# Patient Record
Sex: FEMALE | Race: WHITE | NOT HISPANIC OR LATINO | Employment: PART TIME | URBAN - METROPOLITAN AREA
[De-identification: names, ages, dates, MRNs, and addresses within clinical notes are randomized per-mention and may not be internally consistent; named-entity substitution may affect disease eponyms.]

---

## 2023-08-07 DIAGNOSIS — K21.00 GASTROESOPHAGEAL REFLUX DISEASE WITH ESOPHAGITIS WITHOUT HEMORRHAGE: Primary | ICD-10-CM

## 2023-08-07 RX ORDER — PANTOPRAZOLE SODIUM 40 MG/1
40 TABLET, DELAYED RELEASE ORAL DAILY
Qty: 90 TABLET | Refills: 0 | Status: SHIPPED | OUTPATIENT
Start: 2023-08-07 | End: 2023-10-30

## 2023-10-30 DIAGNOSIS — K21.00 GASTROESOPHAGEAL REFLUX DISEASE WITH ESOPHAGITIS WITHOUT HEMORRHAGE: ICD-10-CM

## 2023-10-30 RX ORDER — PANTOPRAZOLE SODIUM 40 MG/1
40 TABLET, DELAYED RELEASE ORAL DAILY
Qty: 90 TABLET | Refills: 0 | Status: SHIPPED | OUTPATIENT
Start: 2023-10-30 | End: 2024-02-05

## 2023-12-05 ENCOUNTER — TELEPHONE (OUTPATIENT)
Dept: OBSTETRICS AND GYNECOLOGY | Facility: CLINIC | Age: 53
End: 2023-12-05
Payer: COMMERCIAL

## 2023-12-06 NOTE — TELEPHONE ENCOUNTER
Pt verified by name and .  Pt calling for mammogram req.  Pt is aware she has not been seen in office since .  Nurse explanted to pt Dr. Warren would  like to see her for annual then give her mammogram req after being evaluated.  Pt states nurse can cancel her upcoming appt.  Pt has no questions at this time.

## 2023-12-11 DIAGNOSIS — J30.89 NON-SEASONAL ALLERGIC RHINITIS, UNSPECIFIED TRIGGER: Primary | ICD-10-CM

## 2023-12-11 RX ORDER — MONTELUKAST SODIUM 10 MG/1
10 TABLET ORAL DAILY
Qty: 90 TABLET | Refills: 0 | Status: SHIPPED | OUTPATIENT
Start: 2023-12-11 | End: 2024-03-14

## 2023-12-13 ENCOUNTER — TELEPHONE (OUTPATIENT)
Dept: PRIMARY CARE | Facility: CLINIC | Age: 53
End: 2023-12-13
Payer: COMMERCIAL

## 2023-12-13 DIAGNOSIS — Z12.31 BREAST CANCER SCREENING BY MAMMOGRAM: Primary | ICD-10-CM

## 2023-12-21 ENCOUNTER — ANCILLARY PROCEDURE (OUTPATIENT)
Dept: RADIOLOGY | Facility: CLINIC | Age: 53
End: 2023-12-21
Payer: COMMERCIAL

## 2023-12-21 DIAGNOSIS — Z12.31 BREAST CANCER SCREENING BY MAMMOGRAM: ICD-10-CM

## 2023-12-21 DIAGNOSIS — R92.8 ABNORMAL MAMMOGRAM OF BOTH BREASTS: Primary | ICD-10-CM

## 2023-12-21 PROCEDURE — 77067 SCR MAMMO BI INCL CAD: CPT | Performed by: RADIOLOGY

## 2023-12-21 PROCEDURE — 77063 BREAST TOMOSYNTHESIS BI: CPT | Performed by: RADIOLOGY

## 2023-12-21 PROCEDURE — 77067 SCR MAMMO BI INCL CAD: CPT

## 2023-12-21 NOTE — RESULT ENCOUNTER NOTE
Patient will need further evaluation of her mammograms.  She will need an ultrasound of both breasts and also spot compression views of both breasts.

## 2023-12-26 ENCOUNTER — ANCILLARY PROCEDURE (OUTPATIENT)
Dept: RADIOLOGY | Facility: CLINIC | Age: 53
End: 2023-12-26
Payer: COMMERCIAL

## 2023-12-26 DIAGNOSIS — R92.8 ABNORMAL MAMMOGRAM OF BOTH BREASTS: ICD-10-CM

## 2023-12-26 PROBLEM — N63.20 MASS OF LEFT BREAST: Status: ACTIVE | Noted: 2023-12-26

## 2023-12-26 PROBLEM — R59.0 AXILLARY LYMPHADENOPATHY: Status: ACTIVE | Noted: 2023-12-26

## 2023-12-26 PROCEDURE — 77062 BREAST TOMOSYNTHESIS BI: CPT

## 2023-12-26 PROCEDURE — 77066 DX MAMMO INCL CAD BI: CPT | Performed by: STUDENT IN AN ORGANIZED HEALTH CARE EDUCATION/TRAINING PROGRAM

## 2023-12-26 PROCEDURE — 77062 BREAST TOMOSYNTHESIS BI: CPT | Performed by: STUDENT IN AN ORGANIZED HEALTH CARE EDUCATION/TRAINING PROGRAM

## 2023-12-26 PROCEDURE — 76642 ULTRASOUND BREAST LIMITED: CPT | Performed by: STUDENT IN AN ORGANIZED HEALTH CARE EDUCATION/TRAINING PROGRAM

## 2023-12-26 PROCEDURE — 76642 ULTRASOUND BREAST LIMITED: CPT | Mod: 50

## 2023-12-26 PROCEDURE — 76982 USE 1ST TARGET LESION: CPT

## 2023-12-26 NOTE — PROGRESS NOTES
"[facility]  Bisi Reynaga female   1970 53 y.o.  [id number]      Chief Complaint  New patient, biopsy consultation.    History Of Present Illness  Bisi Reynaga \"Marlon" is a pleasant 53 y.o. female seen in the breast center for biopsy consultation. She denies breast surgery or biopsy. She has family history of breast cancer, see below.    BREAST IMAGIN2023 Bilateral diagnostic mammogram with bilateral ultrasound, indicates BI-RADS Category 4. Suspicious left breast mass with axillary lymphadenopathy. Ultrasound-guided biopsy of the left breast mass at 1:00 and the axillary lymph node labelled #1 is recommended.     REPRODUCTIVE HISTORY:  menarche age 12, , first birth age 28, did not breastfeed, OCP's over 20 years, natural menopause age 52, no HRT, scattered fibroglandular tissue    FAMILY CANCER HISTORY:   Maternal Aunt: Breast cancer, age 40  Maternal Great Aunt: Breast cancer, age 45  Paternal Grandmother: Colon cancer, age 65  Father: Prostate cancer, age 58 and skin cancer, age 60    Review of Systems  Constitutional:  Negative for appetite change, fatigue, fever and unexpected weight change.   HENT:  Negative for ear pain, hearing loss, nosebleeds, sore throat and trouble swallowing.    Eyes:  Negative for discharge, itching and visual disturbance.   Breast: As stated in HPI.  Respiratory:  Negative for cough, chest tightness and shortness of breath.    Cardiovascular:  Negative for chest pain, palpitations and leg swelling.   Gastrointestinal:  Negative for abdominal pain, constipation, diarrhea and nausea.   Endocrine: Negative for cold intolerance and heat intolerance.   Genitourinary:  Negative for dysuria, frequency, hematuria, pelvic pain and vaginal bleeding.   Musculoskeletal:  Negative for arthralgias, back pain, gait problem, joint swelling and myalgias.   Skin:  Negative for color change and rash.   Allergic/Immunologic: Negative for environmental allergies and " food allergies.   Neurological:  Negative for dizziness, tremors, speech difficulty, weakness, numbness and headaches.   Hematological:  Does not bruise/bleed easily.   Psychiatric/Behavioral:  Negative for agitation, dysphoric mood and sleep disturbance. The patient is not nervous/anxious.       Past Medical History  She has a past medical history of Personal history of urinary calculi (09/25/2014).    Surgical History  She has no past surgical history on file.    Family History  Cancer-related family history includes Breast cancer (age of onset: 40) in her mother's sister and another family member.     Social History  Social History     Tobacco Use    Smoking status: Not on file    Smokeless tobacco: Not on file   Substance Use Topics    Alcohol use: Not on file      Allergies  No Known Allergies    Medications  Current Outpatient Medications   Medication Instructions    montelukast (SINGULAIR) 10 mg, oral, Daily    pantoprazole (PROTONIX) 40 mg, oral, Daily       Last Recorded Vitals  Blood pressure (!) 140/95, pulse 94, temperature 36.7 °C (98.1 °F), weight 84.5 kg (186 lb 2.9 oz).      Physical Exam  Patient is alert and oriented x3 and in a relaxed and appropriate mood. Her gait is steady and hand grasps are equal. Sclera is clear. The breasts are nearly symmetrical. The tissue is soft without palpable abnormalities, discrete nodules or masses. The skin and nipples appear normal. There is no cervical, supraclavicular or axillary lymphadenopathy. Heart rate and rhythm normal, S1 and S2 appreciated. The lungs are clear to auscultation bilaterally. Abdomen is soft and non-tender.      Relevant Results and Imaging    Study Result    Narrative & Impression   Interpreted By:  Neli Gaming,  Kathy Retana   STUDY:  BI MAMMO BILATERAL DIAGNOSTIC TOMOSYNTHESIS; BI US BREAST LIMITED  BILATERAL;  12/26/2023 8:44 am; 12/26/2023 9:17 am      ACCESSION NUMBER(S):  [id number]; [id number]      ORDERING  CLINICIAN:  SULEMAN CHOU      INDICATION:  The patient was recalled from recent screening mammogram 12/21/2023  for a right breast asymmetry and 2 left breast masses.      COMPARISON:  12/21/2023, 06/04/2020, 03/02/2017.      FINDINGS:  MAMMOGRAPHY: 2D and tomosynthesis images were reviewed at 1 mm slice  thickness.      Density:  There are areas of scattered fibroglandular tissue.      The asymmetry in the medial right breast persists on additional  imaging. This is stable dating back to mammogram 08/04/2005. No  suspicious masses or calcifications are identified in the right  breast.      There is an irregular high density mass with associated architectural  distortion in the superolateral left breast at middle depth. An oval  circumscribed equal density mass in the central lateral left breast  at middle depth persists on additional imaging.      ULTRASOUND:  A targeted ultrasound of the entire medial right breast  was performed by a registered sonographer.      No suspicious sonographic abnormalities are identified to correspond  with the right breast asymmetry.      ULTRASOUND:  A targeted ultrasound of the left breast and axilla was  performed by a registered sonographer using elastography.      An irregular hypoechoic mass with microlobulated margins is seen at  the 1 o'clock position 8 cm from the nipple. The mass measures 1.9 x  1.2 x 2.0 cm. It demonstrates internal vascularity and hard  elastography characteristics. This corresponds with irregular  mammographic left breast mass.      A round circumscribed anechoic cyst is identified at the 4 o'clock  position 3 cm from the nipple. The cyst measures 0.4 x 0.4 x 0.3 cm.  It is avascular and soft on elastography. This benign cyst  corresponds with the mammographic mass in the central lateral breast.      A targeted ultrasound of the left axilla demonstrates 1  morphologically abnormal left axillary lymph node. The axillary lymph  node labeled #1  demonstrates a thickened cortex measuring 0.7 cm. An  additional 4 morphologically normal lymph nodes are identified.      IMPRESSION:  1. Suspicious left breast mass with axillary lymphadenopathy. Further  evaluation with surgical consultation and ultrasound-guided biopsy of  the left breast mass at 1:00 and the axillary lymph node labelled #1  is recommended. Dr. Mazin Vazquez explained the findings and  recommendations to the patient at the time of exam. A message was  sent to the referring practitioner at the time of this dictation  regarding these critical findings using the Epic notification system.  A pre-procedure form was filled out.  2. A benign simple left breast cyst at the 4:00 position correlates  with the smaller mammographic left breast mass. No additional imaging  follow-up is recommended for this finding.  3. The right breast asymmetry without a sonographic correlate is  mammographically stable dating back to 2005 and considered benign.  This likely represents the sternalis muscle.      Method of Detection: Category Sdbt - 3D Screening      BI-RADS CATEGORY:      BI-RADS Category:  4 Suspicious.  Recommendation:  Biopsy.  Recommended Date:  Immediate.  Laterality:  Left.      For any future breast imaging appointments, please call [phone] ([phone]).      I personally reviewed the images/study and I agree with the findings  as stated by fellow physician, Dr. Mazin Vazquez.          MACRO:  Critical Finding:  Breast Imaging Abnormality. Notification was  initiated on 12/26/2023 at 9:53 am by  Mazin Vazquez.  (**-YCF-**)  Instructions:  Surgical Consultation and Imaging Guided Biopsy.      Signed by: Neli Gaming 12/26/2023 10:04 AM     Time was spent viewing digital images. I explained the results in depth, along with suggested explanation for follow up recommendations based on the testing results. BI-RADS Category 4    Visit Diagnosis  1. Abnormal finding on breast imaging        2. Mass of left  breast, unspecified quadrant        3. Axillary lymphadenopathy          Assessment/Plan  Abnormal mammogram, left breast mass, left axillary lymphadenopathy, no breast surgery or biopsy, family history of breast cancer, scattered fibroglandular tissue    Plan:  Left breast and axillary ultrasound guided core biopsy.    Patient Discussion/Summary  I recommend a left breast and axillary ultrasound guided core biopsy. A breast radiology physician will perform the procedure. Possible diagnoses include benign, atypia or cancer. Bruising and mild discomfort after the biopsy is normal and will improve. I typically have results in 5-7 business days. I will call you with the results, please have your phone handy to take my call. I will provide recommendations for future follow up based on your biopsy results.     IMPORTANT INFORMATION REGARDING YOUR RESULTS    If you receive medical information from My [facility] Personal Health Record (online chart) your results will be released into your chart. This means you may view or see results of your biopsy or procedure before I contact you directly. If this occurs, please call the office and we will discuss your results over the phone.    You can see your health information, review clinical summaries from office visits & test results online when you follow your health with MY  Chart, a personal health record. To sign up go to [email or web address]. If you need assistance with signing up or trouble getting into your account call [facility] Patient Line 24/7 at [phone].    My office phone number is [phone]  if you need to get in touch with me or have additional questions or concerns. Thank you for choosing [facility] and trusting me as your healthcare provider. I look forward to seeing you again at your next office visit. I am honored to be a provider on your health care team and I remain dedicated to helping you achieve your health goals.       Yuliana Schmitt,  APRN-CNP

## 2023-12-27 ENCOUNTER — APPOINTMENT (OUTPATIENT)
Dept: RADIOLOGY | Facility: HOSPITAL | Age: 53
End: 2023-12-27
Payer: COMMERCIAL

## 2023-12-27 ENCOUNTER — PROCEDURE VISIT (OUTPATIENT)
Dept: SURGICAL ONCOLOGY | Facility: CLINIC | Age: 53
End: 2023-12-27
Payer: COMMERCIAL

## 2023-12-27 VITALS
TEMPERATURE: 98.1 F | SYSTOLIC BLOOD PRESSURE: 140 MMHG | HEART RATE: 94 BPM | DIASTOLIC BLOOD PRESSURE: 95 MMHG | BODY MASS INDEX: 27.49 KG/M2 | WEIGHT: 186.18 LBS

## 2023-12-27 DIAGNOSIS — N63.20 MASS OF LEFT BREAST, UNSPECIFIED QUADRANT: ICD-10-CM

## 2023-12-27 DIAGNOSIS — R59.0 AXILLARY LYMPHADENOPATHY: ICD-10-CM

## 2023-12-27 DIAGNOSIS — R92.8 ABNORMAL FINDING ON BREAST IMAGING: Primary | ICD-10-CM

## 2023-12-27 PROCEDURE — 99214 OFFICE O/P EST MOD 30 MIN: CPT | Performed by: NURSE PRACTITIONER

## 2023-12-27 PROCEDURE — 99204 OFFICE O/P NEW MOD 45 MIN: CPT | Performed by: NURSE PRACTITIONER

## 2023-12-27 ASSESSMENT — PAIN SCALES - GENERAL: PAINLEVEL: 0-NO PAIN

## 2023-12-28 ENCOUNTER — TELEPHONE (OUTPATIENT)
Dept: RADIOLOGY | Facility: HOSPITAL | Age: 53
End: 2023-12-28
Payer: COMMERCIAL

## 2023-12-28 ENCOUNTER — APPOINTMENT (OUTPATIENT)
Dept: RADIOLOGY | Facility: HOSPITAL | Age: 53
End: 2023-12-28
Payer: COMMERCIAL

## 2023-12-28 NOTE — TELEPHONE ENCOUNTER
Spoke with pt about date, time location and parking, aware it is okay to eat drink and drive as long as not taking sedating medications or having another appointment with different instructions, reviewed pre, post and discharge instructions, instructed to wear most supportive bra and tylenol for discomfort. Verbalizing understanding , no questions  NKA, okay with lidocaine, denies use of blood thinning medications.

## 2023-12-29 ENCOUNTER — HOSPITAL ENCOUNTER (OUTPATIENT)
Dept: RADIOLOGY | Facility: HOSPITAL | Age: 53
Discharge: HOME | End: 2023-12-29
Payer: COMMERCIAL

## 2023-12-29 ENCOUNTER — APPOINTMENT (OUTPATIENT)
Dept: RADIOLOGY | Facility: CLINIC | Age: 53
End: 2023-12-29
Payer: COMMERCIAL

## 2023-12-29 DIAGNOSIS — R92.8 ABNORMAL FINDING ON BREAST IMAGING: ICD-10-CM

## 2023-12-29 DIAGNOSIS — R92.8 OTHER ABNORMAL AND INCONCLUSIVE FINDINGS ON DIAGNOSTIC IMAGING OF BREAST: ICD-10-CM

## 2023-12-29 DIAGNOSIS — N63.20 MASS OF LEFT BREAST, UNSPECIFIED QUADRANT: ICD-10-CM

## 2023-12-29 DIAGNOSIS — R59.0 AXILLARY LYMPHADENOPATHY: ICD-10-CM

## 2023-12-29 PROCEDURE — 38505 NEEDLE BIOPSY LYMPH NODES: CPT | Mod: LEFT SIDE | Performed by: RADIOLOGY

## 2023-12-29 PROCEDURE — 88368 INSITU HYBRIDIZATION MANUAL: CPT | Performed by: PATHOLOGY

## 2023-12-29 PROCEDURE — 2720000007 HC OR 272 NO HCPCS

## 2023-12-29 PROCEDURE — 88360 TUMOR IMMUNOHISTOCHEM/MANUAL: CPT | Performed by: PATHOLOGY

## 2023-12-29 PROCEDURE — 19083 BX BREAST 1ST LESION US IMAG: CPT | Mod: LEFT SIDE | Performed by: RADIOLOGY

## 2023-12-29 PROCEDURE — 88305 TISSUE EXAM BY PATHOLOGIST: CPT | Performed by: PATHOLOGY

## 2023-12-29 PROCEDURE — 10035 PLMT SFT TISS LOCLZJ DEV 1ST: CPT | Mod: LEFT SIDE | Performed by: RADIOLOGY

## 2023-12-29 PROCEDURE — 76942 ECHO GUIDE FOR BIOPSY: CPT

## 2023-12-29 PROCEDURE — 88305 TISSUE EXAM BY PATHOLOGIST: CPT | Mod: TC,SUR | Performed by: NURSE PRACTITIONER

## 2023-12-29 PROCEDURE — 19083 BX BREAST 1ST LESION US IMAG: CPT | Mod: LT

## 2023-12-29 PROCEDURE — 38505 NEEDLE BIOPSY LYMPH NODES: CPT

## 2023-12-29 PROCEDURE — 2500000005 HC RX 250 GENERAL PHARMACY W/O HCPCS: Performed by: RADIOLOGY

## 2023-12-29 PROCEDURE — 77065 DX MAMMO INCL CAD UNI: CPT | Mod: LEFT SIDE | Performed by: RADIOLOGY

## 2023-12-29 PROCEDURE — 77065 DX MAMMO INCL CAD UNI: CPT

## 2023-12-29 RX ADMIN — Medication 6 ML: at 09:15

## 2023-12-29 RX ADMIN — Medication 6 ML: at 09:00

## 2023-12-29 NOTE — Clinical Note
0840 reviewed procedure confirmed no allergies, no pain not pregnant, no blood thinning medication used has not used lidocaine in the past feels safe at home no history of falls, no breast pain, area cleansed x2, lidocaine given by Dr. So, first procedure left breast completed 0907, pain 1/10  pressure held for 10 min, axilla completed 0921 pain 1/10,debrief 0924, doctor out of [id number] pressure held for 10 min, both sites without bl eeding, steri strips and DSD applied, mammogram completed and okayed, reviewed discharge instructions given pamphlet and card with provider and nurse line numbers, observed site demonstrated ice placement, verbalizing understanding no questions pain remains 1/10 area D&I, verbalizing understanding, no questions

## 2024-01-02 ENCOUNTER — TELEPHONE (OUTPATIENT)
Dept: SURGICAL ONCOLOGY | Facility: HOSPITAL | Age: 54
End: 2024-01-02
Payer: COMMERCIAL

## 2024-01-02 LAB
LABORATORY COMMENT REPORT: NORMAL
PATH REPORT.FINAL DX SPEC: NORMAL
PATH REPORT.GROSS SPEC: NORMAL
PATH REPORT.RELEVANT HX SPEC: NORMAL
PATH REPORT.TOTAL CANCER: NORMAL

## 2024-01-02 NOTE — TELEPHONE ENCOUNTER
"Result Communication    Spoke with Bisi Reynaga regarding breast biopsy results showing cancer with positive lymph node. Office will call patient to schedule a surgical consultation.     Resulted Orders   Surgical Pathology Exam   Result Value Ref Range    Case Report       Surgical Pathology                                Case: [id number]                                  Authorizing Provider:  JOHN Canales    Collected:           12/29/2023 0850              Ordering Location:     [facility]       Received:            12/29/2023 6224                                     Center                                                                       Pathologist:           Kylie Fried MD                                                       Specimen:    AXILLARY LYMPH NODE BIOPSY LEFT - BREAST, Left axilla                                      FINAL DIAGNOSIS       A. Left axillary lymph node, biopsy:    -- Metastatic carcinoma, see note.    Note: The invasive tumor measures up to 0.65 cm in this limited sample.     : Dr Melissa Leone                By the signature on this report, the individual or group listed as making the Final Interpretation/Diagnosis certifies that they have reviewed this case.       Clinical History       Ultrasound guided core biopsy left axilla       Gross Description       A: Received in formalin, labeled with the patient´s name and hospital number and \" left axilla\", are multiple irregular cylindrical segments of yellow-white fatty soft tissue aggregating to 2.3 x 0.4 x 0.4 cm.  The specimen is submitted in toto in 2 cassettes.  DMB           4:14 PM      "

## 2024-01-08 ENCOUNTER — PATIENT MESSAGE (OUTPATIENT)
Dept: PRIMARY CARE | Facility: CLINIC | Age: 54
End: 2024-01-08
Payer: COMMERCIAL

## 2024-01-08 DIAGNOSIS — J01.00 ACUTE NON-RECURRENT MAXILLARY SINUSITIS: Primary | ICD-10-CM

## 2024-01-08 RX ORDER — AMOXICILLIN AND CLAVULANATE POTASSIUM 500; 125 MG/1; MG/1
500 TABLET, FILM COATED ORAL 3 TIMES DAILY
Qty: 30 TABLET | Refills: 0 | Status: SHIPPED | OUTPATIENT
Start: 2024-01-08 | End: 2024-01-18

## 2024-01-09 ENCOUNTER — OFFICE VISIT (OUTPATIENT)
Dept: SURGICAL ONCOLOGY | Facility: HOSPITAL | Age: 54
End: 2024-01-09
Payer: COMMERCIAL

## 2024-01-09 VITALS
DIASTOLIC BLOOD PRESSURE: 97 MMHG | HEART RATE: 95 BPM | TEMPERATURE: 97.9 F | BODY MASS INDEX: 26.91 KG/M2 | WEIGHT: 182.2 LBS | SYSTOLIC BLOOD PRESSURE: 133 MMHG

## 2024-01-09 DIAGNOSIS — C50.412 MALIGNANT NEOPLASM OF UPPER-OUTER QUADRANT OF LEFT BREAST IN FEMALE, ESTROGEN RECEPTOR POSITIVE (MULTI): Primary | ICD-10-CM

## 2024-01-09 DIAGNOSIS — N63.21 MASS OF UPPER OUTER QUADRANT OF LEFT BREAST: ICD-10-CM

## 2024-01-09 DIAGNOSIS — R92.8 ABNORMAL FINDING ON BREAST IMAGING: ICD-10-CM

## 2024-01-09 DIAGNOSIS — R59.0 AXILLARY LYMPHADENOPATHY: ICD-10-CM

## 2024-01-09 DIAGNOSIS — Z17.0 MALIGNANT NEOPLASM OF UPPER-OUTER QUADRANT OF LEFT BREAST IN FEMALE, ESTROGEN RECEPTOR POSITIVE (MULTI): Primary | ICD-10-CM

## 2024-01-09 LAB
LAB AP ASR DISCLAIMER: NORMAL
LABORATORY COMMENT REPORT: NORMAL
PATH REPORT.ADDENDUM SPEC: NORMAL
PATH REPORT.ADDENDUM SPEC: NORMAL
PATH REPORT.COMMENTS IMP SPEC: NORMAL
PATH REPORT.FINAL DX SPEC: NORMAL
PATH REPORT.GROSS SPEC: NORMAL
PATH REPORT.RELEVANT HX SPEC: NORMAL
PATH REPORT.TOTAL CANCER: NORMAL

## 2024-01-09 PROCEDURE — 99215 OFFICE O/P EST HI 40 MIN: CPT | Performed by: SURGERY

## 2024-01-09 RX ORDER — CEFAZOLIN SODIUM 2 G/100ML
2 INJECTION, SOLUTION INTRAVENOUS ONCE
Status: CANCELLED | OUTPATIENT
Start: 2024-02-07 | End: 2024-01-09

## 2024-01-09 RX ORDER — SODIUM CHLORIDE 9 MG/ML
100 INJECTION, SOLUTION INTRAVENOUS CONTINUOUS
Status: CANCELLED | OUTPATIENT
Start: 2024-01-09

## 2024-01-09 NOTE — H&P (VIEW-ONLY)
"Subjective   Patient ID: Tanya Reynaga is a 53 y.o. female presenting for surgical consultation.     HPI  Bisi Reynaga \"Marlon" is a pleasant 53 y.o. female referred by Yuliana Schmitt CNP for left breast invasive ductal carcinoma, grade 3, ER + >95%, NE + 10%, HER2 pending, [id number],  clinical stage IIA.      BREAST IMAGIN2023 Bilateral diagnostic mammogram with bilateral ultrasound, indicates BI-RADS Category 4. Suspicious left breast mass with axillary lymphadenopathy. Ultrasound-guided biopsy of the left breast mass at 1:00 and the axillary lymph node labelled #1 is recommended. Additional normal-appearing lymph nodes visualized.     On 23 Left breast, ultrasound-guided core biopsy showed invasive ductal carcinoma, grade 3. Left lymph node showed metastatic carcinoma. ER + >95%, NE + 10%, HER2 pending,      She presents for surgical consultation. Prior to biopsy she had not noted any breast masses or nodules, skin or nipple changes, nipple discharge, or axillary lymphadenopathy bilaterally.      REPRODUCTIVE HISTORY:  menarche age 12, , first birth age 28, did not breastfeed, OCP's over 20 years, natural menopause age 52, no HRT, scattered fibroglandular tissue     FAMILY CANCER HISTORY:   Maternal Aunt: Breast cancer, age 40  Maternal Great Aunt: Breast cancer, age 45  Paternal Grandmother: Colon cancer, age 65  Father: Prostate cancer, age 58 and skin cancer, age 60  Review of Systems    Objective   Physical Exam  General: Otherwise healthy appearing. No acute distress.     HEENT: Conjunctiva well-colored, sclera non-icteric. Neck supple, trachea midline. No lymphadenopathy or thyromegaly.     Cardiovascular: Regular rate and rhythm.    Respiratory: Clear to auscultation bilaterally.     Breast: Exam performed in the sitting and supine positions. Right breast: No obvious abnormalities palpable. No skin or nipple changes. Left breast: 1:00, 7 cm FN well healed core needle biopsy site. Well " healed low axillary core needle biopsy site. Bra size 36 G, grade 2 ptosis.     Abdomen: Soft, non-tender, non-distended.    Extremities: Atraumatic, no edema.     Neurologic: Motor and sensory grossly intact. Alert and oriented.     Lymphatic: No axillary, supraclavicular, or infraclavicular lymphadenopathy bilaterally.     Assessment/Plan   Today we reviewed your pertinent history, physical exam, imaging, and pathology. We discussed the natural history, prognosis, and treatment of breast cancer.     We discussed the surgical approach to early stage breast cancer and the equivalence in survival with lumpectomy plus radiation and mastectomy. Lumpectomy means removing the tumor with a rim of normal tissue around it to ensure that the margins are free of cancer. There is a small chance of having a positive margin, which would require further surgery. Mastectomy means removing all the breast tissue, including the nipple and areolar complex. If you have a mastectomy, we briefly discussed options for reconstruction, which would be done by a plastic surgeon. Given the small size of the cancer, I recommend a lumpectomy. I explained that we will use a magnetic seed to localize the cancer before the lumpectomy. This is a procedure done by a breast radiologist prior to surgery.  We also need to evaluate the lymph nodes under the arm with a sentinel lymph node biopsy. A small amount of radiotracer and blue dye will be injected prior to surgery to identify the first several nodes that drain the breast tissue, and these will be removed to check for cancer cells.     The surgery itself is a same-day outpatient procedure. We reviewed the expectations for surgery and postoperative care, and this information was provided in writing. Following surgery, you will return to the office for a postoperative visit and to discuss your pathology results. We discussed the risks, benefits, and alternatives to surgery, including bleeding,  infection, lymphedema (arm swelling), impaired wound healing, numbness, pain, and breast asymmetry. Informed consent was obtained.     We discussed the overall treatment of breast cancer, which may include radiation, chemotherapy, and/or anti-estrogen medication.    All questions were answered and we are in agreement with the following plan:  1. Left breast Magseed localized lumpectomy and sentinel lymph node biopsy with dual tracer mapping and Magseed localization at [facility] 2/7/2024.  2. Blood work, EKG for preoperative clearance..  3. Referral to genetics for consultation.     The hospital will contact you with details about the day of surgery.  If you have any questions or concerns, please call us at [phone] (option #2). I appreciate the opportunity to care for you, and I look forward to seeing you again soon.     **DISCLAIMER** Speech recognition software was used to create portions of this document. While an attempt at proofreading has been made, minor errors in transcription may be present.    Diagnoses and all orders for this visit:  Malignant neoplasm of upper-outer quadrant of left breast in female, estrogen receptor positive (CMS/HCC)  -     Case Request Operating Room: Left breast Magseed localized partial mastectomy, Left axillary sentinel lymph node biopsy with dual tracer and Magseed localization; Standing  -     CBC; Future  -     Basic Metabolic Panel; Future  -     ECG 12 lead; Future  -     Referral to Genetics; Future  -     BI US guided breast localization left; Future  -     NM injection only for sentinel node biopsy  no scan performed; Future  Mass of upper outer quadrant of left breast  Axillary lymphadenopathy  Abnormal finding on breast imaging  Other orders  -     Place in outpatient/hospital ambulatory surgery; Standing  -     Full code; Standing  -     Vital Signs; Standing  -     Pulse oximetry, spot; Standing  -     Apply CODEY hose knee length;  Standing  -     Apply sequential compression device; Standing  -     Insert peripheral IV; Standing  -     Saline lock IV; Standing  -     POCT pregnancy, urine; Standing  -     sodium chloride 0.9% infusion  -     ceFAZolin (Ancef) 2 g in dextrose 5 % in water (D5W) 100 mL IV      Scribe Attestation  By signing my name below, I, Johan Rivasibe, attest that this documentation has been prepared under the direction and in the presence of Raegan Herrera MD.

## 2024-01-09 NOTE — PROGRESS NOTES
"Subjective   Patient ID: Tanya Reynaga is a 53 y.o. female presenting for surgical consultation.     HPI  Bisi Reynaga \"Marlon" is a pleasant 53 y.o. female referred by Yuliana Schmitt CNP for left breast invasive ductal carcinoma, grade 3, ER + >95%, ME + 10%, HER2 pending, [id number],  clinical stage IIA.      BREAST IMAGIN2023 Bilateral diagnostic mammogram with bilateral ultrasound, indicates BI-RADS Category 4. Suspicious left breast mass with axillary lymphadenopathy. Ultrasound-guided biopsy of the left breast mass at 1:00 and the axillary lymph node labelled #1 is recommended. Additional normal-appearing lymph nodes visualized.     On 23 Left breast, ultrasound-guided core biopsy showed invasive ductal carcinoma, grade 3. Left lymph node showed metastatic carcinoma. ER + >95%, ME + 10%, HER2 pending,      She presents for surgical consultation. Prior to biopsy she had not noted any breast masses or nodules, skin or nipple changes, nipple discharge, or axillary lymphadenopathy bilaterally.      REPRODUCTIVE HISTORY:  menarche age 12, , first birth age 28, did not breastfeed, OCP's over 20 years, natural menopause age 52, no HRT, scattered fibroglandular tissue     FAMILY CANCER HISTORY:   Maternal Aunt: Breast cancer, age 40  Maternal Great Aunt: Breast cancer, age 45  Paternal Grandmother: Colon cancer, age 65  Father: Prostate cancer, age 58 and skin cancer, age 60  Review of Systems    Objective   Physical Exam  General: Otherwise healthy appearing. No acute distress.     HEENT: Conjunctiva well-colored, sclera non-icteric. Neck supple, trachea midline. No lymphadenopathy or thyromegaly.     Cardiovascular: Regular rate and rhythm.    Respiratory: Clear to auscultation bilaterally.     Breast: Exam performed in the sitting and supine positions. Right breast: No obvious abnormalities palpable. No skin or nipple changes. Left breast: 1:00, 7 cm FN well healed core needle biopsy site. Well " healed low axillary core needle biopsy site. Bra size 36 G, grade 2 ptosis.     Abdomen: Soft, non-tender, non-distended.    Extremities: Atraumatic, no edema.     Neurologic: Motor and sensory grossly intact. Alert and oriented.     Lymphatic: No axillary, supraclavicular, or infraclavicular lymphadenopathy bilaterally.     Assessment/Plan   Today we reviewed your pertinent history, physical exam, imaging, and pathology. We discussed the natural history, prognosis, and treatment of breast cancer.     We discussed the surgical approach to early stage breast cancer and the equivalence in survival with lumpectomy plus radiation and mastectomy. Lumpectomy means removing the tumor with a rim of normal tissue around it to ensure that the margins are free of cancer. There is a small chance of having a positive margin, which would require further surgery. Mastectomy means removing all the breast tissue, including the nipple and areolar complex. If you have a mastectomy, we briefly discussed options for reconstruction, which would be done by a plastic surgeon. Given the small size of the cancer, I recommend a lumpectomy. I explained that we will use a magnetic seed to localize the cancer before the lumpectomy. This is a procedure done by a breast radiologist prior to surgery.  We also need to evaluate the lymph nodes under the arm with a sentinel lymph node biopsy. A small amount of radiotracer and blue dye will be injected prior to surgery to identify the first several nodes that drain the breast tissue, and these will be removed to check for cancer cells.     The surgery itself is a same-day outpatient procedure. We reviewed the expectations for surgery and postoperative care, and this information was provided in writing. Following surgery, you will return to the office for a postoperative visit and to discuss your pathology results. We discussed the risks, benefits, and alternatives to surgery, including bleeding,  infection, lymphedema (arm swelling), impaired wound healing, numbness, pain, and breast asymmetry. Informed consent was obtained.     We discussed the overall treatment of breast cancer, which may include radiation, chemotherapy, and/or anti-estrogen medication.    All questions were answered and we are in agreement with the following plan:  1. Left breast Magseed localized lumpectomy and sentinel lymph node biopsy with dual tracer mapping and Magseed localization at [facility] 2/7/2024.  2. Blood work, EKG for preoperative clearance..  3. Referral to genetics for consultation.     The hospital will contact you with details about the day of surgery.  If you have any questions or concerns, please call us at [phone] (option #2). I appreciate the opportunity to care for you, and I look forward to seeing you again soon.     **DISCLAIMER** Speech recognition software was used to create portions of this document. While an attempt at proofreading has been made, minor errors in transcription may be present.    Diagnoses and all orders for this visit:  Malignant neoplasm of upper-outer quadrant of left breast in female, estrogen receptor positive (CMS/HCC)  -     Case Request Operating Room: Left breast Magseed localized partial mastectomy, Left axillary sentinel lymph node biopsy with dual tracer and Magseed localization; Standing  -     CBC; Future  -     Basic Metabolic Panel; Future  -     ECG 12 lead; Future  -     Referral to Genetics; Future  -     BI US guided breast localization left; Future  -     NM injection only for sentinel node biopsy  no scan performed; Future  Mass of upper outer quadrant of left breast  Axillary lymphadenopathy  Abnormal finding on breast imaging  Other orders  -     Place in outpatient/hospital ambulatory surgery; Standing  -     Full code; Standing  -     Vital Signs; Standing  -     Pulse oximetry, spot; Standing  -     Apply CODEY hose knee length;  Standing  -     Apply sequential compression device; Standing  -     Insert peripheral IV; Standing  -     Saline lock IV; Standing  -     POCT pregnancy, urine; Standing  -     sodium chloride 0.9% infusion  -     ceFAZolin (Ancef) 2 g in dextrose 5 % in water (D5W) 100 mL IV      Scribe Attestation  By signing my name below, I, Johan Rivasibe, attest that this documentation has been prepared under the direction and in the presence of Raegan Herrera MD.

## 2024-01-10 ENCOUNTER — TELEPHONE (OUTPATIENT)
Dept: SURGICAL ONCOLOGY | Facility: CLINIC | Age: 54
End: 2024-01-10
Payer: COMMERCIAL

## 2024-01-17 ENCOUNTER — TELEMEDICINE CLINICAL SUPPORT (OUTPATIENT)
Dept: GENETICS | Facility: CLINIC | Age: 54
End: 2024-01-17
Payer: COMMERCIAL

## 2024-01-17 DIAGNOSIS — Z13.71 ENCOUNTER FOR NONPROCREATIVE GENETIC COUNSELING AND TESTING: Primary | ICD-10-CM

## 2024-01-17 DIAGNOSIS — Z80.3 FAMILY HISTORY OF BREAST CANCER: ICD-10-CM

## 2024-01-17 DIAGNOSIS — Z17.0 MALIGNANT NEOPLASM OF UPPER-OUTER QUADRANT OF LEFT BREAST IN FEMALE, ESTROGEN RECEPTOR POSITIVE (MULTI): ICD-10-CM

## 2024-01-17 DIAGNOSIS — C50.412 MALIGNANT NEOPLASM OF UPPER-OUTER QUADRANT OF LEFT BREAST IN FEMALE, ESTROGEN RECEPTOR POSITIVE (MULTI): ICD-10-CM

## 2024-01-17 DIAGNOSIS — Z80.0 FAMILY HX OF COLON CANCER: ICD-10-CM

## 2024-01-17 DIAGNOSIS — Z71.83 ENCOUNTER FOR NONPROCREATIVE GENETIC COUNSELING AND TESTING: Primary | ICD-10-CM

## 2024-01-17 DIAGNOSIS — Z80.42 FAMILY HISTORY OF PROSTATE CANCER: ICD-10-CM

## 2024-01-17 PROCEDURE — 96040 PR MEDICAL GENETICS COUNSELING EACH 30 MINUTES: CPT | Performed by: GENETIC COUNSELOR, MS

## 2024-01-17 NOTE — PROGRESS NOTES
"History of Present Illness:  Bisi Reynaga \"Marlon" is a 53 y.o. female with a recent diagnosis of breast cancer as well as a family history of breast cancer, Ms. Reynaga was referred to the Cancer Genetics Clinic at [facility] by Dr. Raegan Herrera. Ms. Reynaga is interested in genetic testing to clarify her personal risk for cancer, as well as the risks to her family members.    Cancer Medical History:  Personal history of cancer? Yes.  Type: Breast (left).  Age at diagnosis: 53.  Summary: Diagnosed with left invasive ductal carcinoma, grade 3, ER+ (>95%)/CA+ (10%)/Her2-. Lymph node biopsy positive for metastatic disease. Current surgical plan is for partial mastectomy (lumpectomy) with sentinel lymph node biopsy 2024.    History of other cancers: No.    Prior hereditary cancer genetic testing? No.    Cancer screening history:  Mammograms? Yes.  PAP smear? Yes, per gyn. History of cervical dysplasia at age 18.  Colonoscopy? Yes, x1 about 2-3 years ago. Reports no history of colon polyps. Asked to return in 7 years.  Upper endoscopy? No.  Dermatology? Yes, last a couple years ago. Reports she has a lot of moles. Has never had any lesions removed and sent for pathology.  Other cancer screening? No.    Reproductive History:  Number of children: 2.  Number of pregnancies: 2.  Age first birth: 28.  Breast feeding? No.  Menarche (age): 12.  Menopause (age): 52.  OCP: Yes, for 20+ years.  HRT: No.    Hysterectomy? No.  Oophorectomy? No.    Family history:  A 4-generation pedigree was obtained and was significant for the following:  -Patient, breast cancer at age 53 per the above;  -Maternal aunt, breast cancer at age 50, alive at 91;  -Maternal great aunt (through [facility]), breast cancer at age 50 ( at age 70);  -Father, prostate cancer at 58, alive at age 78;  -Paternal grandfather, colon cancer at age 60 ( at age 65);  -Paternal grandmother, colon cancer at age 62 ( at age 65).    Ms. Reynaga is white, " There is no known Ashkenazi Muslim ancestry or consanguinity.    Genetic counseling:  Ms. Reynaga is a 53 y.o.-year-old female with a recent diagnosis of breast cancer, as well as a family history of breast cancer concerning for hereditary breast cancer.  This could be BRCA1 or BRCA2-related hereditary breast and ovarian cancer (HBOC), or hereditary breast cancer due to a different gene mutation, such as PALB2.  Ms. Reynaga meets current national (NCCN) criteria for testing of high-penetrance breast cancer susceptibility genes, including BRCA1, BRCA2, CDH1, PALB2, PTEN, and TP53.  Testing is medically necessary, as it will help determine if Ms. Reynaga is a candidate for bilateral mastectomies as well as risk-reducing BSO (having her ovaries and fallopian tubes removed to prevent ovarian cancer).    We reviewed genes and chromosomes, inherited forms of breast and ovarian cancer, and the BRCA1 and BRCA2 genes causing HBOC.  We discussed that most cancers are not due to an inherited genetic susceptibility.  However, in about 5-10% of families, there is an inherited genetic mutation that can make a person more susceptible to developing certain forms of cancer.  Within these families, we often see multiple family members with cancer, occurring in multiple generations.  In addition, earlier onset and bilateral cancers are suggestive of an inherited form of cancer.  Finally, there is a clustering of certain types of cancer in these families, such as breast and ovarian cancer.    We discussed the BRCA1 and BRCA2 genes, which are two genes that have been linked to early-onset breast and/or ovarian cancer.  Mutations in these genes are inherited in a dominant pattern and confer up to an 87% lifetime risk for breast cancer.  This is elevated compared to the general population risk of 10-12%.  In addition, BRCA1 and BRCA2 mutation carriers have up to a 45% lifetime risk for ovarian cancer, which is elevated over the 2% general  population risk.  Mutation carriers who have already been diagnosed with cancer have an increased risk to develop a second, contralateral breast cancer.  BRCA2 gene mutation carriers have an increased risk for male breast cancer, prostate cancer, melanoma, gastric cancer, and pancreatic cancer.    We discussed that there are multiple genes associated with increased breast and/or ovarian cancer risk. Some genes, like the BRCA genes are considered highly penetrant breast and ovarian cancer genes, meaning a mutation in the gene confers a high risk of breast and/or ovarian cancer. On the other hand, there are other intermediate (moderate risk) breast and ovarian cancer genes. For many of the moderate risk genes, there is sometimes limited information regarding the degree to which a mutation in the gene affects risk of different types of cancers. Additionally, for some of these moderate risk genes, the appropriate management for individuals who have a mutation in one of these genes is not always clear. In many cases, even if an individual tests positive for a mutation in a moderate risk gene, recommendations are still based on the family history, not the positive test result.    Ms. Reynaga was counseled about hereditary cancer susceptibility including cancer risks, options for increased screening and/or risk reduction, genetic testing, and the implications for other family members.  We discussed performing testing for high-penetrance breast cancer susceptibility genes, ideally as part of a multi-gene panel.  We specifically discussed both the STAT/[facility] [facility] BRCAPlus panel, which examines the following 8 genes: ALONZO, BRCA1, BRCA2, CDH1, CHEK2, PALB2, PTEN, TP53, as well as the [facility] CancerNext panel, which examines the following 36 genes:  APC, ALONZO, AXIN2, BARD1, BRCA1, BRCA2, BRIP1, BMPR1A, CDH1, CDK4, CDKN2A, CHEK2, DICER1, EPCAM, GREM1, HOXB13, MLH1, MSH2, MSH3, MSH6, MUTYH, NBN, NF1, NTHL1, PALB2, PMS2, POLD1, POLE,  PTEN, RAD51C, RAD51D, RECQL, SMAD4, SMARCA4, STK11, TP53.    After a discussion about the risks, benefits, and limitations of genetic testing,  Ms. Reynaga elected to undergo genetic testing for hereditary cancer using the [facility] panels described above. Ms. Reynaga gave her oral consent to proceed with testing. As her surgery date is scheduled for 2/7/24 and we would like to have some results back prior to then, we will have an [facility] DNA/RNA blood test kit overnighted/rushed to the patient's home. She will then bring the test kit to a  outpatient blood draw lab to have their blood drawn for testing (using the test tubes provided in the kit). The sample will then be sent to [facility] for analysis.    The STAT/rush results generally return in <=2 weeks. The larger panel results can take up to an additional 3 weeks. We will call out the STAT/marcano results to Ms. Reynaga when available. Should Ms. Reynaga be found to have a pathogenic or likely pathogenic mutation associated with an increased risk for cancer, a follow-up visit to review results in detail will be recommended.    PLAN:  1. Ms. Reynaga elected to undergo genetic testing for hereditary cancer using the [facility] panels described above. Ms. Reynaga gave her oral consent to proceed with testing. As her surgery date is scheduled for 2/7/24 and we would like to have some results back prior to then, we will have an [facility] DNA/RNA blood test kit overnighted/rushed to the patient's home. She will then bring the test kit to a  outpatient blood draw lab to have their blood drawn for testing (using the test tubes provided in the kit). The sample will then be sent to [facility] for analysis.    2. The STAT/rush results generally return in <=2 weeks. The larger panel results can take up to an additional 3 weeks. We will call out the STAT/marcano results to Ms. Reynaga when available. Should Ms. Reynaga be found to have a pathogenic or likely pathogenic mutation associated  with an increased risk for cancer, a follow-up visit to review results in detail will be recommended.    3. We remain available to Ms. Reynaga at [phone] if any questions arise regarding information discussed at today's visit. Chelita can be reached directly at [phone].    Chelita Serna MS, [facility]  Genetic Counselor  [facility]  [phone]    Reviewed by:  Shira Medina MD  Clinical   [facility]  [phone]    Time spent with patient:  43 minutes (12:41-1:24 pm), virtual visit.

## 2024-01-18 ENCOUNTER — APPOINTMENT (OUTPATIENT)
Dept: SURGICAL ONCOLOGY | Facility: CLINIC | Age: 54
End: 2024-01-18
Payer: COMMERCIAL

## 2024-01-22 ENCOUNTER — LAB (OUTPATIENT)
Dept: LAB | Facility: LAB | Age: 54
End: 2024-01-22
Payer: COMMERCIAL

## 2024-01-22 DIAGNOSIS — Z80.3 FAMILY HISTORY OF BREAST CANCER: ICD-10-CM

## 2024-01-22 DIAGNOSIS — Z17.0 MALIGNANT NEOPLASM OF UPPER-OUTER QUADRANT OF LEFT BREAST IN FEMALE, ESTROGEN RECEPTOR POSITIVE (MULTI): ICD-10-CM

## 2024-01-22 DIAGNOSIS — C50.412 MALIGNANT NEOPLASM OF UPPER-OUTER QUADRANT OF LEFT BREAST IN FEMALE, ESTROGEN RECEPTOR POSITIVE (MULTI): ICD-10-CM

## 2024-01-22 LAB
ANION GAP SERPL CALC-SCNC: 13 MMOL/L (ref 10–20)
BUN SERPL-MCNC: 12 MG/DL (ref 6–23)
CALCIUM SERPL-MCNC: 10.3 MG/DL (ref 8.6–10.6)
CHLORIDE SERPL-SCNC: 105 MMOL/L (ref 98–107)
CO2 SERPL-SCNC: 27 MMOL/L (ref 21–32)
CREAT SERPL-MCNC: 0.76 MG/DL (ref 0.5–1.05)
EGFRCR SERPLBLD CKD-EPI 2021: >90 ML/MIN/1.73M*2
ERYTHROCYTE [DISTWIDTH] IN BLOOD BY AUTOMATED COUNT: 13.2 % (ref 11.5–14.5)
GLUCOSE SERPL-MCNC: 92 MG/DL (ref 74–99)
HCT VFR BLD AUTO: 43.9 % (ref 36–46)
HGB BLD-MCNC: 15.2 G/DL (ref 12–16)
MCH RBC QN AUTO: 29.7 PG (ref 26–34)
MCHC RBC AUTO-ENTMCNC: 34.6 G/DL (ref 32–36)
MCV RBC AUTO: 86 FL (ref 80–100)
NRBC BLD-RTO: 0 /100 WBCS (ref 0–0)
PLATELET # BLD AUTO: 321 X10*3/UL (ref 150–450)
POTASSIUM SERPL-SCNC: 4.1 MMOL/L (ref 3.5–5.3)
RBC # BLD AUTO: 5.12 X10*6/UL (ref 4–5.2)
SODIUM SERPL-SCNC: 141 MMOL/L (ref 136–145)
WBC # BLD AUTO: 4.6 X10*3/UL (ref 4.4–11.3)

## 2024-01-22 PROCEDURE — 36415 COLL VENOUS BLD VENIPUNCTURE: CPT

## 2024-01-22 PROCEDURE — 80048 BASIC METABOLIC PNL TOTAL CA: CPT

## 2024-01-22 PROCEDURE — 85027 COMPLETE CBC AUTOMATED: CPT

## 2024-01-25 ENCOUNTER — HOSPITAL ENCOUNTER (OUTPATIENT)
Dept: RADIOLOGY | Facility: CLINIC | Age: 54
Discharge: HOME | End: 2024-01-25
Payer: COMMERCIAL

## 2024-01-25 DIAGNOSIS — C50.412 MALIGNANT NEOPLASM OF UPPER-OUTER QUADRANT OF LEFT FEMALE BREAST (MULTI): ICD-10-CM

## 2024-01-25 DIAGNOSIS — C50.412 MALIGNANT NEOPLASM OF UPPER-OUTER QUADRANT OF LEFT BREAST IN FEMALE, ESTROGEN RECEPTOR POSITIVE (MULTI): ICD-10-CM

## 2024-01-25 DIAGNOSIS — Z17.0 ESTROGEN RECEPTOR POSITIVE STATUS (ER+): ICD-10-CM

## 2024-01-25 DIAGNOSIS — Z17.0 MALIGNANT NEOPLASM OF UPPER-OUTER QUADRANT OF LEFT BREAST IN FEMALE, ESTROGEN RECEPTOR POSITIVE (MULTI): ICD-10-CM

## 2024-01-25 PROCEDURE — 77065 DX MAMMO INCL CAD UNI: CPT | Mod: LEFT SIDE | Performed by: RADIOLOGY

## 2024-01-25 PROCEDURE — 19285 PERQ DEV BREAST 1ST US IMAG: CPT | Mod: LEFT SIDE | Performed by: RADIOLOGY

## 2024-01-25 PROCEDURE — 19285 PERQ DEV BREAST 1ST US IMAG: CPT | Mod: LT

## 2024-01-25 PROCEDURE — 77065 DX MAMMO INCL CAD UNI: CPT | Mod: LT

## 2024-01-25 PROCEDURE — 10035 PLMT SFT TISS LOCLZJ DEV 1ST: CPT | Mod: LEFT SIDE | Performed by: RADIOLOGY

## 2024-01-25 PROCEDURE — 2780000003 HC OR 278 NO HCPCS

## 2024-01-25 PROCEDURE — 2500000005 HC RX 250 GENERAL PHARMACY W/O HCPCS: Performed by: RADIOLOGY

## 2024-01-25 PROCEDURE — A4648 IMPLANTABLE TISSUE MARKER: HCPCS

## 2024-01-25 RX ADMIN — Medication 10 ML: at 08:11

## 2024-01-25 ASSESSMENT — PAIN SCALES - GENERAL
PAINLEVEL_OUTOF10: 0 - NO PAIN

## 2024-01-25 NOTE — DISCHARGE INSTRUCTIONS
Post procedure care reviewed with patient, ok to resume normal activity with no restrictions. Patient verbalized understanding and will follow up surgery as planned.     Patient left breast center at 0836 in good spirits.

## 2024-01-25 NOTE — Clinical Note
Patient offered aromatherapy, warm blankets and music. Guided imagery, touch and relaxation breathing to be used throughout the procedure.

## 2024-01-25 NOTE — Clinical Note
Procedural steps explained and patient given opportunity to verbalize concerns and seek clarification.  Post procedure self-care and potential for bruising , hematoma, and pain reviewed.  Patient verbalizes understanding.

## 2024-01-29 ENCOUNTER — TELEPHONE (OUTPATIENT)
Dept: GENETICS | Facility: CLINIC | Age: 54
End: 2024-01-29
Payer: COMMERCIAL

## 2024-01-29 NOTE — TELEPHONE ENCOUNTER
Called Bisi Reynaga to let her know that her initial genetic test results (8-gene [employer] BRCAPlus panel) returned and are NEGATIVE (normal). The larger panel results are still pending (slated to return by or before 2/13/24). Discussed that I will call out the larger results to her when available.    Chelita Serna MS, [facility]  Genetic Counselor  [facility]  [phone]

## 2024-02-02 LAB
LAB MOLECULAR CA TECHNICAL NOTES: NORMAL
SCAN RESULT: NORMAL

## 2024-02-03 DIAGNOSIS — K21.00 GASTROESOPHAGEAL REFLUX DISEASE WITH ESOPHAGITIS WITHOUT HEMORRHAGE: ICD-10-CM

## 2024-02-05 ENCOUNTER — HOSPITAL ENCOUNTER (OUTPATIENT)
Dept: CARDIOLOGY | Facility: CLINIC | Age: 54
Discharge: HOME | End: 2024-02-05
Payer: COMMERCIAL

## 2024-02-05 DIAGNOSIS — C50.412 MALIGNANT NEOPLASM OF UPPER-OUTER QUADRANT OF LEFT BREAST IN FEMALE, ESTROGEN RECEPTOR POSITIVE (MULTI): ICD-10-CM

## 2024-02-05 DIAGNOSIS — Z17.0 MALIGNANT NEOPLASM OF UPPER-OUTER QUADRANT OF LEFT BREAST IN FEMALE, ESTROGEN RECEPTOR POSITIVE (MULTI): ICD-10-CM

## 2024-02-05 PROCEDURE — 93005 ELECTROCARDIOGRAM TRACING: CPT

## 2024-02-05 PROCEDURE — 93010 ELECTROCARDIOGRAM REPORT: CPT | Performed by: INTERNAL MEDICINE

## 2024-02-05 RX ORDER — PANTOPRAZOLE SODIUM 40 MG/1
40 TABLET, DELAYED RELEASE ORAL DAILY
Qty: 30 TABLET | Refills: 3 | Status: SHIPPED | OUTPATIENT
Start: 2024-02-05 | End: 2024-06-03 | Stop reason: SDUPTHER

## 2024-02-07 ENCOUNTER — APPOINTMENT (OUTPATIENT)
Dept: RADIOLOGY | Facility: HOSPITAL | Age: 54
End: 2024-02-07
Payer: COMMERCIAL

## 2024-02-07 ENCOUNTER — ANESTHESIA (OUTPATIENT)
Dept: OPERATING ROOM | Facility: HOSPITAL | Age: 54
End: 2024-02-07
Payer: COMMERCIAL

## 2024-02-07 ENCOUNTER — HOSPITAL ENCOUNTER (OUTPATIENT)
Facility: HOSPITAL | Age: 54
Setting detail: OUTPATIENT SURGERY
Discharge: HOME | End: 2024-02-07
Attending: SURGERY | Admitting: SURGERY
Payer: COMMERCIAL

## 2024-02-07 ENCOUNTER — ANESTHESIA EVENT (OUTPATIENT)
Dept: OPERATING ROOM | Facility: HOSPITAL | Age: 54
End: 2024-02-07
Payer: COMMERCIAL

## 2024-02-07 ENCOUNTER — HOSPITAL ENCOUNTER (OUTPATIENT)
Dept: RADIOLOGY | Facility: HOSPITAL | Age: 54
Setting detail: OUTPATIENT SURGERY
Discharge: HOME | End: 2024-02-07
Payer: COMMERCIAL

## 2024-02-07 VITALS
DIASTOLIC BLOOD PRESSURE: 73 MMHG | WEIGHT: 191.8 LBS | BODY MASS INDEX: 28.41 KG/M2 | RESPIRATION RATE: 16 BRPM | TEMPERATURE: 96.8 F | HEART RATE: 85 BPM | HEIGHT: 69 IN | OXYGEN SATURATION: 94 % | SYSTOLIC BLOOD PRESSURE: 112 MMHG

## 2024-02-07 DIAGNOSIS — C50.412 MALIGNANT NEOPLASM OF UPPER-OUTER QUADRANT OF LEFT BREAST IN FEMALE, ESTROGEN RECEPTOR POSITIVE (MULTI): ICD-10-CM

## 2024-02-07 DIAGNOSIS — Z17.0 MALIGNANT NEOPLASM OF UPPER-OUTER QUADRANT OF LEFT BREAST IN FEMALE, ESTROGEN RECEPTOR POSITIVE (MULTI): ICD-10-CM

## 2024-02-07 PROCEDURE — 88341 IMHCHEM/IMCYTCHM EA ADD ANTB: CPT | Performed by: PATHOLOGY

## 2024-02-07 PROCEDURE — 7100000009 HC PHASE TWO TIME - INITIAL BASE CHARGE: Performed by: SURGERY

## 2024-02-07 PROCEDURE — A38525 PR BX/REMV,LYMPH NODE,DEEP AXILL: Performed by: NURSE ANESTHETIST, CERTIFIED REGISTERED

## 2024-02-07 PROCEDURE — 2500000005 HC RX 250 GENERAL PHARMACY W/O HCPCS: Performed by: NURSE ANESTHETIST, CERTIFIED REGISTERED

## 2024-02-07 PROCEDURE — 88342 IMHCHEM/IMCYTCHM 1ST ANTB: CPT | Performed by: PATHOLOGY

## 2024-02-07 PROCEDURE — 2500000005 HC RX 250 GENERAL PHARMACY W/O HCPCS: Performed by: SURGERY

## 2024-02-07 PROCEDURE — 76098 X-RAY EXAM SURGICAL SPECIMEN: CPT | Performed by: RADIOLOGY

## 2024-02-07 PROCEDURE — A9520 TC99 TILMANOCEPT DIAG 0.5MCI: HCPCS | Performed by: SURGERY

## 2024-02-07 PROCEDURE — 88307 TISSUE EXAM BY PATHOLOGIST: CPT | Mod: TC,SUR,PARLAB,MUE | Performed by: SURGERY

## 2024-02-07 PROCEDURE — 3600000009 HC OR TIME - EACH INCREMENTAL 1 MINUTE - PROCEDURE LEVEL FOUR: Performed by: SURGERY

## 2024-02-07 PROCEDURE — 76098 X-RAY EXAM SURGICAL SPECIMEN: CPT

## 2024-02-07 PROCEDURE — 2720000007 HC OR 272 NO HCPCS: Performed by: SURGERY

## 2024-02-07 PROCEDURE — 14301 TIS TRNFR ANY 30.1-60 SQ CM: CPT | Performed by: SURGERY

## 2024-02-07 PROCEDURE — 38792 RA TRACER ID OF SENTINL NODE: CPT | Performed by: SURGERY

## 2024-02-07 PROCEDURE — 3430000001 HC RX 343 DIAGNOSTIC RADIOPHARMACEUTICALS: Performed by: SURGERY

## 2024-02-07 PROCEDURE — 2500000004 HC RX 250 GENERAL PHARMACY W/ HCPCS (ALT 636 FOR OP/ED): Performed by: REGISTERED NURSE

## 2024-02-07 PROCEDURE — 7100000002 HC RECOVERY ROOM TIME - EACH INCREMENTAL 1 MINUTE: Performed by: SURGERY

## 2024-02-07 PROCEDURE — 2500000004 HC RX 250 GENERAL PHARMACY W/ HCPCS (ALT 636 FOR OP/ED): Performed by: NURSE ANESTHETIST, CERTIFIED REGISTERED

## 2024-02-07 PROCEDURE — 38900 IO MAP OF SENT LYMPH NODE: CPT | Performed by: SURGERY

## 2024-02-07 PROCEDURE — 3700000001 HC GENERAL ANESTHESIA TIME - INITIAL BASE CHARGE: Performed by: SURGERY

## 2024-02-07 PROCEDURE — 2500000004 HC RX 250 GENERAL PHARMACY W/ HCPCS (ALT 636 FOR OP/ED): Performed by: SURGERY

## 2024-02-07 PROCEDURE — A38525 PR BX/REMV,LYMPH NODE,DEEP AXILL: Performed by: ANESTHESIOLOGY

## 2024-02-07 PROCEDURE — A4217 STERILE WATER/SALINE, 500 ML: HCPCS | Performed by: SURGERY

## 2024-02-07 PROCEDURE — 38792 RA TRACER ID OF SENTINL NODE: CPT

## 2024-02-07 PROCEDURE — 3700000002 HC GENERAL ANESTHESIA TIME - EACH INCREMENTAL 1 MINUTE: Performed by: SURGERY

## 2024-02-07 PROCEDURE — 3600000004 HC OR TIME - INITIAL BASE CHARGE - PROCEDURE LEVEL FOUR: Performed by: SURGERY

## 2024-02-07 PROCEDURE — 76098 X-RAY EXAM SURGICAL SPECIMEN: CPT | Performed by: SURGERY

## 2024-02-07 PROCEDURE — 19301 PARTIAL MASTECTOMY: CPT | Performed by: SURGERY

## 2024-02-07 PROCEDURE — 7100000001 HC RECOVERY ROOM TIME - INITIAL BASE CHARGE: Performed by: SURGERY

## 2024-02-07 PROCEDURE — 38525 BIOPSY/REMOVAL LYMPH NODES: CPT | Performed by: SURGERY

## 2024-02-07 PROCEDURE — 7100000010 HC PHASE TWO TIME - EACH INCREMENTAL 1 MINUTE: Performed by: SURGERY

## 2024-02-07 PROCEDURE — 88307 TISSUE EXAM BY PATHOLOGIST: CPT | Performed by: PATHOLOGY

## 2024-02-07 PROCEDURE — 2500000004 HC RX 250 GENERAL PHARMACY W/ HCPCS (ALT 636 FOR OP/ED): Performed by: ANESTHESIOLOGY

## 2024-02-07 RX ORDER — LIDOCAINE HYDROCHLORIDE 10 MG/ML
INJECTION INFILTRATION; PERINEURAL AS NEEDED
Status: DISCONTINUED | OUTPATIENT
Start: 2024-02-07 | End: 2024-02-07 | Stop reason: HOSPADM

## 2024-02-07 RX ORDER — HYDROMORPHONE HYDROCHLORIDE 1 MG/ML
1 INJECTION, SOLUTION INTRAMUSCULAR; INTRAVENOUS; SUBCUTANEOUS EVERY 5 MIN PRN
Status: DISCONTINUED | OUTPATIENT
Start: 2024-02-07 | End: 2024-02-07 | Stop reason: HOSPADM

## 2024-02-07 RX ORDER — DIPHENHYDRAMINE HYDROCHLORIDE 50 MG/ML
12.5 INJECTION INTRAMUSCULAR; INTRAVENOUS ONCE AS NEEDED
Status: DISCONTINUED | OUTPATIENT
Start: 2024-02-07 | End: 2024-02-07 | Stop reason: HOSPADM

## 2024-02-07 RX ORDER — CEFAZOLIN SODIUM 2 G/100ML
2 INJECTION, SOLUTION INTRAVENOUS ONCE
Status: COMPLETED | OUTPATIENT
Start: 2024-02-07 | End: 2024-02-07

## 2024-02-07 RX ORDER — ONDANSETRON HYDROCHLORIDE 2 MG/ML
4 INJECTION, SOLUTION INTRAVENOUS ONCE AS NEEDED
Status: DISCONTINUED | OUTPATIENT
Start: 2024-02-07 | End: 2024-02-07 | Stop reason: HOSPADM

## 2024-02-07 RX ORDER — HYDRALAZINE HYDROCHLORIDE 20 MG/ML
5 INJECTION INTRAMUSCULAR; INTRAVENOUS EVERY 30 MIN PRN
Status: DISCONTINUED | OUTPATIENT
Start: 2024-02-07 | End: 2024-02-07 | Stop reason: HOSPADM

## 2024-02-07 RX ORDER — MEPERIDINE HYDROCHLORIDE 25 MG/ML
12.5 INJECTION INTRAMUSCULAR; INTRAVENOUS; SUBCUTANEOUS EVERY 10 MIN PRN
Status: DISCONTINUED | OUTPATIENT
Start: 2024-02-07 | End: 2024-02-07 | Stop reason: HOSPADM

## 2024-02-07 RX ORDER — SODIUM CHLORIDE, SODIUM LACTATE, POTASSIUM CHLORIDE, CALCIUM CHLORIDE 600; 310; 30; 20 MG/100ML; MG/100ML; MG/100ML; MG/100ML
100 INJECTION, SOLUTION INTRAVENOUS CONTINUOUS
Status: DISCONTINUED | OUTPATIENT
Start: 2024-02-07 | End: 2024-02-07 | Stop reason: HOSPADM

## 2024-02-07 RX ORDER — KETOROLAC TROMETHAMINE 30 MG/ML
INJECTION, SOLUTION INTRAMUSCULAR; INTRAVENOUS AS NEEDED
Status: DISCONTINUED | OUTPATIENT
Start: 2024-02-07 | End: 2024-02-07

## 2024-02-07 RX ORDER — LABETALOL HYDROCHLORIDE 5 MG/ML
5 INJECTION, SOLUTION INTRAVENOUS ONCE AS NEEDED
Status: DISCONTINUED | OUTPATIENT
Start: 2024-02-07 | End: 2024-02-07 | Stop reason: HOSPADM

## 2024-02-07 RX ORDER — ONDANSETRON HYDROCHLORIDE 2 MG/ML
INJECTION, SOLUTION INTRAVENOUS AS NEEDED
Status: DISCONTINUED | OUTPATIENT
Start: 2024-02-07 | End: 2024-02-07

## 2024-02-07 RX ORDER — MIDAZOLAM HYDROCHLORIDE 1 MG/ML
INJECTION, SOLUTION INTRAMUSCULAR; INTRAVENOUS AS NEEDED
Status: DISCONTINUED | OUTPATIENT
Start: 2024-02-07 | End: 2024-02-07

## 2024-02-07 RX ORDER — SODIUM CHLORIDE 0.9 G/100ML
IRRIGANT IRRIGATION AS NEEDED
Status: DISCONTINUED | OUTPATIENT
Start: 2024-02-07 | End: 2024-02-07 | Stop reason: HOSPADM

## 2024-02-07 RX ORDER — TRAMADOL HYDROCHLORIDE 50 MG/1
50 TABLET ORAL EVERY 6 HOURS PRN
Qty: 15 TABLET | Refills: 0 | Status: SHIPPED | OUTPATIENT
Start: 2024-02-07 | End: 2024-05-02 | Stop reason: WASHOUT

## 2024-02-07 RX ORDER — BUPIVACAINE HYDROCHLORIDE 5 MG/ML
INJECTION, SOLUTION PERINEURAL AS NEEDED
Status: DISCONTINUED | OUTPATIENT
Start: 2024-02-07 | End: 2024-02-07 | Stop reason: HOSPADM

## 2024-02-07 RX ORDER — FENTANYL CITRATE 50 UG/ML
INJECTION, SOLUTION INTRAMUSCULAR; INTRAVENOUS AS NEEDED
Status: DISCONTINUED | OUTPATIENT
Start: 2024-02-07 | End: 2024-02-07

## 2024-02-07 RX ORDER — PROPOFOL 10 MG/ML
INJECTION, EMULSION INTRAVENOUS AS NEEDED
Status: DISCONTINUED | OUTPATIENT
Start: 2024-02-07 | End: 2024-02-07

## 2024-02-07 RX ORDER — DEXAMETHASONE SODIUM PHOSPHATE 4 MG/ML
INJECTION, SOLUTION INTRA-ARTICULAR; INTRALESIONAL; INTRAMUSCULAR; INTRAVENOUS; SOFT TISSUE AS NEEDED
Status: DISCONTINUED | OUTPATIENT
Start: 2024-02-07 | End: 2024-02-07

## 2024-02-07 RX ORDER — LIDOCAINE HYDROCHLORIDE 20 MG/ML
INJECTION, SOLUTION EPIDURAL; INFILTRATION; INTRACAUDAL; PERINEURAL AS NEEDED
Status: DISCONTINUED | OUTPATIENT
Start: 2024-02-07 | End: 2024-02-07

## 2024-02-07 RX ORDER — SODIUM CHLORIDE 9 MG/ML
100 INJECTION, SOLUTION INTRAVENOUS CONTINUOUS
Status: DISCONTINUED | OUTPATIENT
Start: 2024-02-07 | End: 2024-02-07 | Stop reason: HOSPADM

## 2024-02-07 RX ORDER — GLYCOPYRROLATE 0.2 MG/ML
INJECTION INTRAMUSCULAR; INTRAVENOUS AS NEEDED
Status: DISCONTINUED | OUTPATIENT
Start: 2024-02-07 | End: 2024-02-07

## 2024-02-07 RX ORDER — ACETAMINOPHEN 325 MG/1
650 TABLET ORAL EVERY 4 HOURS PRN
Status: DISCONTINUED | OUTPATIENT
Start: 2024-02-07 | End: 2024-02-07 | Stop reason: HOSPADM

## 2024-02-07 RX ADMIN — FENTANYL CITRATE 25 MCG: 50 INJECTION, SOLUTION INTRAMUSCULAR; INTRAVENOUS at 15:35

## 2024-02-07 RX ADMIN — KETOROLAC TROMETHAMINE 15 MG: 30 INJECTION, SOLUTION INTRAMUSCULAR at 16:05

## 2024-02-07 RX ADMIN — CEFAZOLIN SODIUM 2 G: 2 INJECTION, SOLUTION INTRAVENOUS at 14:20

## 2024-02-07 RX ADMIN — SODIUM CHLORIDE: 9 INJECTION, SOLUTION INTRAVENOUS at 15:47

## 2024-02-07 RX ADMIN — FENTANYL CITRATE 25 MCG: 50 INJECTION, SOLUTION INTRAMUSCULAR; INTRAVENOUS at 15:44

## 2024-02-07 RX ADMIN — DEXAMETHASONE SODIUM PHOSPHATE 8 MG: 4 INJECTION, SOLUTION INTRAMUSCULAR; INTRAVENOUS at 14:11

## 2024-02-07 RX ADMIN — FENTANYL CITRATE 50 MCG: 50 INJECTION, SOLUTION INTRAMUSCULAR; INTRAVENOUS at 14:11

## 2024-02-07 RX ADMIN — SODIUM CHLORIDE 100 ML/HR: 9 INJECTION, SOLUTION INTRAVENOUS at 13:29

## 2024-02-07 RX ADMIN — FENTANYL CITRATE 25 MCG: 50 INJECTION, SOLUTION INTRAMUSCULAR; INTRAVENOUS at 14:55

## 2024-02-07 RX ADMIN — ONDANSETRON 4 MG: 2 INJECTION, SOLUTION INTRAMUSCULAR; INTRAVENOUS at 14:11

## 2024-02-07 RX ADMIN — HYDROMORPHONE HYDROCHLORIDE 1 MG: 1 INJECTION, SOLUTION INTRAMUSCULAR; INTRAVENOUS; SUBCUTANEOUS at 16:48

## 2024-02-07 RX ADMIN — LIDOCAINE HYDROCHLORIDE 50 MG: 20 INJECTION, SOLUTION EPIDURAL; INFILTRATION; INTRACAUDAL; PERINEURAL at 14:17

## 2024-02-07 RX ADMIN — PROPOFOL 150 MG: 10 INJECTION, EMULSION INTRAVENOUS at 14:17

## 2024-02-07 RX ADMIN — GLYCOPYRROLATE 0.2 MG: 0.2 INJECTION INTRAMUSCULAR; INTRAVENOUS at 14:11

## 2024-02-07 RX ADMIN — HYDROMORPHONE HYDROCHLORIDE 0.5 MG: 1 INJECTION, SOLUTION INTRAMUSCULAR; INTRAVENOUS; SUBCUTANEOUS at 17:22

## 2024-02-07 RX ADMIN — FENTANYL CITRATE 25 MCG: 50 INJECTION, SOLUTION INTRAMUSCULAR; INTRAVENOUS at 15:18

## 2024-02-07 RX ADMIN — SODIUM CHLORIDE: 9 INJECTION, SOLUTION INTRAVENOUS at 14:05

## 2024-02-07 RX ADMIN — TILMANOCEPT 0.83 MILLICURIE: KIT at 13:59

## 2024-02-07 RX ADMIN — MIDAZOLAM 2 MG: 1 INJECTION INTRAMUSCULAR; INTRAVENOUS at 14:11

## 2024-02-07 RX ADMIN — FENTANYL CITRATE 50 MCG: 50 INJECTION, SOLUTION INTRAMUSCULAR; INTRAVENOUS at 14:28

## 2024-02-07 RX ADMIN — PROPOFOL 50 MG: 10 INJECTION, EMULSION INTRAVENOUS at 14:27

## 2024-02-07 SDOH — HEALTH STABILITY: MENTAL HEALTH: CURRENT SMOKER: 0

## 2024-02-07 ASSESSMENT — PAIN SCALES - GENERAL
PAINLEVEL_OUTOF10: 4
PAINLEVEL_OUTOF10: 3
PAINLEVEL_OUTOF10: 1
PAINLEVEL_OUTOF10: 0 - NO PAIN
PAINLEVEL_OUTOF10: 3
PAINLEVEL_OUTOF10: 3
PAIN_LEVEL: 2
PAINLEVEL_OUTOF10: 8
PAINLEVEL_OUTOF10: 0 - NO PAIN
PAINLEVEL_OUTOF10: 0 - NO PAIN
PAINLEVEL_OUTOF10: 2

## 2024-02-07 ASSESSMENT — PAIN DESCRIPTION - DESCRIPTORS: DESCRIPTORS: SORE

## 2024-02-07 ASSESSMENT — PAIN DESCRIPTION - LOCATION
LOCATION: BREAST
LOCATION: BREAST

## 2024-02-07 ASSESSMENT — PAIN - FUNCTIONAL ASSESSMENT
PAIN_FUNCTIONAL_ASSESSMENT: 0-10

## 2024-02-07 ASSESSMENT — PAIN DESCRIPTION - ORIENTATION
ORIENTATION: LEFT
ORIENTATION: LEFT

## 2024-02-07 ASSESSMENT — COLUMBIA-SUICIDE SEVERITY RATING SCALE - C-SSRS
6. HAVE YOU EVER DONE ANYTHING, STARTED TO DO ANYTHING, OR PREPARED TO DO ANYTHING TO END YOUR LIFE?: NO
2. HAVE YOU ACTUALLY HAD ANY THOUGHTS OF KILLING YOURSELF?: NO
1. IN THE PAST MONTH, HAVE YOU WISHED YOU WERE DEAD OR WISHED YOU COULD GO TO SLEEP AND NOT WAKE UP?: NO

## 2024-02-07 NOTE — OP NOTE
"LEFT BREAST MAGSEED LOCALIZED PARTIAL MASTECTOMY (L), LEFT AXILLARY SENTINEL LYMPH NODE BIOPSY WITH DUAL TRACER & MAGSEED LOCALIZATION (L) Operative Note     Date: 2024  OR Location: [facility]    Name: Bisi Reynaga \"Marlon", : 1970, Age: 54 y.o., MRN: [id number], Sex: female    Diagnosis  Pre-op Diagnosis     * Malignant neoplasm of upper-outer quadrant of left breast in female, estrogen receptor positive (CMS/HCC) [C50.412, Z17.0] Post-op Diagnosis     * Malignant neoplasm of upper-outer quadrant of left breast in female, estrogen receptor positive (CMS/HCC) [C50.412, Z17.0]     Procedures  1. Injection of radiotracer for intraoperative lymphatic mapping.  2. Injection of isosulfan blue for intraoperative lymphatic mapping.  3. Left axillary sentinel lymph node biopsy with dual tracer intraoperative lymphatic mapping  4.  Left axillary lymph node excision with Magseed localization.  5.  Left breast Magseed localized partial mastectomy.  6. Interpretation of intraoperative specimen xray x2    Surgeons      * Raegan Herrera - Primary    Resident/Fellow/Other Assistant:  Surgeon(s) and Role: Vladiimr Scott SA    Procedure Summary  Anesthesia: General  ASA: II  Anesthesia Staff: Anesthesiologist: Fletcher Be MD  CRNA: TRACY Whiting, EBEN; TRACY Oviedo; TRACY Sebastian, DNANOLBERTO  Estimated Blood Loss: 15 mL  Intra-op Medications:   Administrations occurring from 1400 to 1630 on 24:   Medication Name Total Dose   sodium chloride 0.9 % irrigation solution 1,000 mL   lidocaine (Xylocaine) 10 mg/mL (1 %) injection 10 mL   BUPivacaine HCl (Marcaine) 0.5 % (5 mg/mL) injection 14 mL   sodium chloride 0.9% infusion 185 mL   ceFAZolin in dextrose (iso-os) (Ancef) IVPB 2 g 2 g              Anesthesia Record               Intraprocedure I/O Totals          Intake    sodium chloride 0.9% infusion 1025.00 mL    ceFAZolin in dextrose (iso-os) (Ancef) IVPB 2 g 100.00 mL    Total Intake 1125 mL "       Output    Urine 0 mL    Est. Blood Loss 15 mL    Total Output 15 mL       Net    Net Volume 1110 mL          Specimen:   ID Type Source Tests Collected by Time   1 : LEFT BREAST MAGSEED LOCALIZED PARTIAL MASTECTOMY- DOUBLE LONG STITCH LATERAL, DOUBLE SHORT STITCH SUPERIOR, SINGLE STITCH ANTERIOR Tissue BREAST, EXCISION OF MASS LEFT SURGICAL PATHOLOGY EXAM Raegan Herrera MD 2/7/2024 1452   2 : LEFT AXILLARY SENTINEL LYMPH NODE #1 MAGSEED AND HOT Tissue SENTINEL LYMPH NODE BREAST LEFT SURGICAL PATHOLOGY EXAM Raegan Herrera MD 2/7/2024 1521   3 : LEFT BREAST NEW ANTERIOR MARGIN- INK MARKS NEW Tissue BREAST MARGIN LEFT SURGICAL PATHOLOGY EXAM Raegan Herrera MD 2/7/2024 1441   4 : LEFT BREAST NEW POSTERIOR MARGIN- INK MILES NEW Tissue BREAST MARGIN LEFT SURGICAL PATHOLOGY EXAM Raegan Herrera MD 2/7/2024 1441   5 : LEFT BREAST NEW MEDIAL MARGIN- INK MARKS NEW Tissue BREAST MARGIN LEFT SURGICAL PATHOLOGY EXAM Raegan Herrera MD 2/7/2024 1442   6 : LEFT BREAST NEW LATERAL MARGIN- INK MARKS NEW Tissue BREAST MARGIN LEFT SURGICAL PATHOLOGY EXAM Raegan Herrera MD 2/7/2024 1442   7 : LEFT BREAST NEW SUPERIOR MARGIN- INK MILES NEW Tissue BREAST MARGIN LEFT SURGICAL PATHOLOGY EXAM Raegan Herrera MD 2/7/2024 1443   8 : LEFT BREAST NEW INFERIOR MARGIN- INK MILES NEW Tissue BREAST MARGIN LEFT SURGICAL PATHOLOGY EXAM Raegan Herrera MD 2/7/2024 1443   9 : LEFT AXILLARY SENTINEL LYMPH NODE #2 HOT Tissue SENTINEL LYMPH NODE BREAST LEFT SURGICAL PATHOLOGY EXAM Raegan Herrera MD 2/7/2024 1526   10 : LEFT AXILLARY SENTINEL LYMPH NODE #3 HOT Tissue SENTINEL LYMPH NODE BREAST LEFT SURGICAL PATHOLOGY EXAM Raegan Herrera MD 2/7/2024 1530   11 : LEFT AXILLARY SENTINEL LYMPH NODE #4 HOT Tissue SENTINEL LYMPH NODE BREAST LEFT SURGICAL PATHOLOGY EXAM Raegan Herrera MD 2/7/2024 1533        Staff:   Circulator: Sheryl Ashley RN  Relief Circulator: Danna Carmen RN  Relief Scrub: Gareth Archer  Scrub Person: Manuela  [facility]       INDICATIONS FOR PROCEDURE:    Bisi Reynaga is a 53-year-old female diagnosed with left breast IDC, ER+/DC+/HER2-; [id number], clinical stage IIA. Only the biopsied and positive lymph node appeared abnormal on work-up and I recommended partial mastectomy, axillary sentinel lymph node biopsy, excision of the axillary lymph node with Magseed localization. Following a detailed discussion regarding risks, benefits, and alternatives, informed consent was obtained, and we agreed to proceed. Prior to her procedure today she had a magnetic seed (Magseed) placed in the left breast and left axillary lymph node for intraoperative localization purposes.  My personal review of her post procedure images demonstrated the Magseeds in good position.     DESCRIPTION OF PROCEDURE:    The patient was brought to the operating room and positioned supine on the OR table.  Following patient identification and a time-out procedure, SCDs were applied, and antibiotics were administered.  General anesthesia was initiated.  I utilized the Sentimag probe to confirm the placement of the Magseeds within the breast and axilla.  I personally injected Tc-99 and 3 mL of methylene blue into the patient's left breasts for the two tracers for intraoperative lymphatic mapping.  The breast was massaged for 5 minutes.  I confirmed uptake of the radiotracer in the axilla with the Neoprobe. The operative field was prepped and draped in the standard sterile fashion.      We began with the left breast. The cancer was located in the superior pole at anterior-mid depth. A curvilinear incision was chosen to hide the scar in a cosmetic location. 1% lidocaine was injected subcutaneously.  The skin was incised sharply.  Dissection was carried down through skin and subcutaneous tissues. Soft tissue flaps were then raised in all directions: superior, inferior, medial, and lateral.  The tumor was located superior and medial to our incision;  therefore, a tunneling technique was used.  I identified the anterior mastectomy plane between the subcutaneous tissues and the anterior breast parenchyma. The Sentimag was used to indentify the Magseed within the breast tissue marking the tumor. A marking stitch was placed for dissection purposes. This area was widely and circumferentially dissected using electrocautery.  The specimen was excised and labeled as left breast tissue.  It was marked with a double short stitch superior, double long stitch lateral, and a single stitch anterior. I personally inked all 6 margins of the entire specimen with the Vector kit using colors as prescribed.  It was placed in the Trident machine for an x-ray which demonstrated the biopsy clip and Magseed within the specimen. I personally interpreted these images intraoperatively.  The specimen was then sent to Pathology for permanent section.      I then proceeded to take shave cavity margins circumferentially: superior, medial, lateral, inferior, anterior, and posterior. Each margin was marked with a clip and ink on the new margin, and sent separately to Pathology for permanent section. The excision cavity was copiously irrigated with warm sterile saline solution.  Hemostasis was achieved.  I then placed marking clips circumferentially around the cavity: superior, inferior, medial, lateral, anterior, and posterior.     The specimen measured 4 cm x  3.5 cm x 2.5 cm, and with shave cavity margins, the soft tissue defect was approximately 42 sq cm; therefore, local tissue rearrangement was performed to close the defect. Surrounding breast parenchyma was mobilized circumferentially from the anterior mastectomy plane anteriorly and posteriorly from within the breast parenchyma.  Once mobilized, the medial-superior and lateral-inferior pillars were advanced centrally and secured to each other with a dyed 3-0 Vicryl suture in an interrupted fashion to close the defect. The more  superficial layer of tissue that had been mobilized in the breast at the beginning of the procedure was similarly closed in order to create a cosmetic effect.     We turned our attention to the left axilla.  An incision was marked at the base of the hair-bearing skin.  1% lidocaine was injected subcutaneously. An incision was made sharply.  Dissection was carried down through the skin and subcutaneous tissues to the clavipectoral fascia, which was widely incised. I utilized the Sentimag probe to identify the previously biopsied lymph node. It was removed and placed in the Trident machine for an x-ray which demonstrated the node, biopsy clip, and Magseed within the specimen. I personally interpreted these images intraoperatively. It was labeled left axillary sentinel lymph node #1.  A similar technique was use to remove additional sentinel nodes: #2 (hot), #3 (hot), #4 (hot). All SLNs were sent to Pathology for permanent section. The axilla was copiously irrigated with warm sterile saline solution.  Hemostasis was achieved.       0.5% Marcaine was injected subcutaneously for analgesia into the breast and axillary incisions.  The incisions were then closed in layers with an interrupted 3-0 Vicryl in the deep dermis followed by a running subcuticular 4-0 Monocryl for the skin. Skin glue, sterile dressings, fluffs, and a surgical bra were then applied.     The patient tolerated the procedure well.  There were no immediate complications.  All counts were correct.  Estimated blood loss was 15 cc.  I was present for and performed the entire procedure.  The patient was then awakened and transported to the PACU in stable condition.        Sontag Node Biopsy for Breast Cancer - Left  Operation performed with curative intent Yes   Tracer(s) used to identify sentinel nodes in the upfront surgery (non-neoadjuvant) setting Dye and Radioactive tracer   Tracer(s) used to identify sentinel nodes in the neoadjuvant setting N/A    All nodes (colored or non-colored) present at the end of a dye-filled lymphatic channel were removed N/A   All significantly radioactive nodes were removed Yes   All palpably suspicious nodes were removed Yes   Biopsy-proven positive nodes marked with clips prior to chemotherapy were identified and removed N/A         Raegan Herrera  Phone Number: [phone]

## 2024-02-07 NOTE — ANESTHESIA PROCEDURE NOTES
Airway  Date/Time: 2/7/2024 2:18 PM  Urgency: elective    Airway not difficult    Staffing  Performed: CRNA   Authorized by: Fletcher Be MD    Performed by: DOMENICO Whiting-QUYNH, EBEN  Patient location during procedure: OR    Indications and Patient Condition  Indications for airway management: anesthesia  Spontaneous ventilation: present  Sedation level: deep  Preoxygenated: yes  Patient position: sniffing  MILS maintained throughout  Mask difficulty assessment: 0 - not attempted    Final Airway Details  Final airway type: supraglottic airway      Successful airway: Supreme  Size 5     Number of attempts at approach: 1  Ventilation between attempts: none  Number of other approaches attempted: 0

## 2024-02-07 NOTE — ANESTHESIA PREPROCEDURE EVALUATION
"Patient: Bisi Reynaga \"Tanya\"    Procedure Information       Date/Time: 02/07/24 1400    Procedures:       1400 SENTINAL INJECTION IN OR/ LEFT BREAST MAGSEED LOCALIZED PARTIAL MASTECTOMY (Left) - Left breast Magseed localized partial mastectomy. Total case time = 2.0 hours      LEFT AXILLARY SENTINEL LYMPH NODE BIOPSY WITH DUAL TRACER & MAGSEED LOCALIZATION/ MAGSEED REMOVAL (Left) - Left axillary sentinel lymph node biopsy with dual tracer and Magseed localization    Location: [facility] 04 / [facility]    Surgeons: Raegan Herrera MD            Relevant Problems   Anesthesia (within normal limits)      Other   (+) Axillary lymphadenopathy       Clinical information reviewed:    Allergies  Meds   Med Hx  Surg Hx   Fam Hx          NPO Detail:  No data recorded     Physical Exam    Airway  Mallampati: II  TM distance: >3 FB  Neck ROM: full     Cardiovascular - normal exam  Rhythm: regular  Rate: normal     Dental - normal exam     Pulmonary - normal exam     Abdominal            Anesthesia Plan    History of general anesthesia?: yes  History of complications of general anesthesia?: no    ASA 2     general     The patient is not a current smoker.    intravenous induction   Postoperative administration of opioids is intended.  Trial extubation is planned.  Anesthetic plan and risks discussed with patient.    Plan discussed with CRNA.      "

## 2024-02-07 NOTE — ANESTHESIA POSTPROCEDURE EVALUATION
"Patient: Bisi Reynaga \"Tanya\"    Procedure Summary       Date: 02/07/24 Room / Location: [facility] / [facility]    Anesthesia Start: 1405 Anesthesia Stop: 1614    Procedures:       LEFT BREAST MAGSEED LOCALIZED PARTIAL MASTECTOMY (Left)      LEFT AXILLARY SENTINEL LYMPH NODE BIOPSY WITH DUAL TRACER & MAGSEED LOCALIZATION (Left) Diagnosis:       Malignant neoplasm of upper-outer quadrant of left breast in female, estrogen receptor positive (CMS/HCC)      (Malignant neoplasm of upper-outer quadrant of left breast in female, estrogen receptor positive (CMS/HCC) [C50.412, Z17.0])    Surgeons: Raegan Herrera MD Responsible Provider: Fletcher Be MD    Anesthesia Type: general ASA Status: 2            Anesthesia Type: general    Vitals Value Taken Time   /96 02/07/24 1615   Temp 36.1 02/07/24 1616   Pulse 101 02/07/24 1615   Resp 16 02/07/24 1616   SpO2 100 % 02/07/24 1615   Vitals shown include unvalidated device data.    Anesthesia Post Evaluation    Patient location during evaluation: bedside  Patient participation: complete - patient participated  Level of consciousness: awake and alert  Pain score: 2  Pain management: adequate  Airway patency: patent  Cardiovascular status: acceptable, hemodynamically stable and stable  Respiratory status: acceptable, room air and spontaneous ventilation  Hydration status: acceptable  Postoperative Nausea and Vomiting: none        There were no known notable events for this encounter.    "

## 2024-02-08 ASSESSMENT — PAIN SCALES - GENERAL: PAINLEVEL_OUTOF10: 0 - NO PAIN

## 2024-02-09 DIAGNOSIS — C50.412 MALIGNANT NEOPLASM OF UPPER-OUTER QUADRANT OF LEFT BREAST IN FEMALE, ESTROGEN RECEPTOR POSITIVE (MULTI): ICD-10-CM

## 2024-02-09 DIAGNOSIS — Z17.0 MALIGNANT NEOPLASM OF UPPER-OUTER QUADRANT OF LEFT BREAST IN FEMALE, ESTROGEN RECEPTOR POSITIVE (MULTI): ICD-10-CM

## 2024-02-10 ENCOUNTER — HOSPITAL ENCOUNTER (OUTPATIENT)
Dept: RADIOLOGY | Facility: CLINIC | Age: 54
Discharge: HOME | End: 2024-02-10
Payer: COMMERCIAL

## 2024-02-10 DIAGNOSIS — R05.1 ACUTE COUGH: ICD-10-CM

## 2024-02-10 PROCEDURE — 71046 X-RAY EXAM CHEST 2 VIEWS: CPT | Performed by: RADIOLOGY

## 2024-02-10 PROCEDURE — 71046 X-RAY EXAM CHEST 2 VIEWS: CPT

## 2024-02-13 ENCOUNTER — TELEPHONE (OUTPATIENT)
Dept: SURGICAL ONCOLOGY | Facility: CLINIC | Age: 54
End: 2024-02-13
Payer: COMMERCIAL

## 2024-02-13 LAB
ATRIAL RATE: 64 BPM
P AXIS: 28 DEGREES
P OFFSET: 203 MS
P ONSET: 157 MS
PR INTERVAL: 128 MS
Q ONSET: 221 MS
QRS COUNT: 11 BEATS
QRS DURATION: 78 MS
QT INTERVAL: 402 MS
QTC CALCULATION(BAZETT): 414 MS
QTC FREDERICIA: 410 MS
R AXIS: 59 DEGREES
T AXIS: 29 DEGREES
T OFFSET: 422 MS
VENTRICULAR RATE: 64 BPM

## 2024-02-15 ENCOUNTER — TUMOR BOARD CONFERENCE (OUTPATIENT)
Dept: HEMATOLOGY/ONCOLOGY | Facility: HOSPITAL | Age: 54
End: 2024-02-15
Payer: COMMERCIAL

## 2024-02-15 LAB
LAB AP ASR DISCLAIMER: NORMAL
LABORATORY COMMENT REPORT: NORMAL
PATH REPORT.FINAL DX SPEC: NORMAL
PATH REPORT.GROSS SPEC: NORMAL
PATH REPORT.RELEVANT HX SPEC: NORMAL
PATH REPORT.TOTAL CANCER: NORMAL
PATHOLOGY SYNOPTIC REPORT: NORMAL

## 2024-02-15 NOTE — TUMOR BOARD NOTE
MULTIDISCIPLINARY BREAST CANCER TUMOR BOARD CONFERENCE NOTE  Bisi Reynaga was presented at Breast Cancer Tumor Board Conference  Conference date: 2/15/2024  Presenting Provider(s): Dr. Raegan Herrera  Present at Conference: Medical Oncology, Radiation Oncology, Surgical Oncology, Radiology, and Pathology Representatives  Conference Review Type: Pathology Review    Tumor Board Stage: pT2N1a M0  Breast Cancer Stage IIA    National Guidelines discussed: Yes    Systemic therapy: Yes, describe: Recommend adjuvant  chemotherapy and adjuvant endocrine therapy  Radiation therapy: Yes, describe: recommend postoperative radiation  Surgical Resection: Yes, s/p left partial mastectomy with left axillary SLNB. Surgery is complete.  Genomic Testing: yes, Oncotype DX  Clinical Trial Eligible: yes, [id number]  Genetics:  negative for actionable mutation    Recommendations: Referral to Medical Oncology for adjuvant chemotherapy and adjuvant endocrine therapy. Referral to Radiation Oncology for postoperative radiation. Eligible for CCTG 1119.    Referral Recommendations:  Radiation Oncology and Medical Oncology    Cancer Staging:  Cancer Staging   Malignant neoplasm of upper-outer quadrant of left breast in female, estrogen receptor positive (CMS/HCC)  Staging form: Breast, AJCC 8th Edition  - Clinical stage from 1/9/2024: Stage IIA (cT1c, cN1, cM0, G3, ER+, NH+, HER2-) - Signed by Raegan Herrera MD on 1/9/2024     Disclaimer  SCC tumor board recommendations represent the consensus opinion of physicians present at a weekly patient care conference. The treating SCC physician is not always present, and many of the physicians formulating the recommendation have not personally seen or examined the patient under discussion. It is understood that the treating SCC physician considers the expertise of the Tumor Board Recommendation in formulating his/her plan for the patient. However, in many situations, based on individualized patient  considerations, a different plan is determined by the treating physician to be the optimal medical management.

## 2024-02-22 ENCOUNTER — APPOINTMENT (OUTPATIENT)
Dept: PRIMARY CARE | Facility: CLINIC | Age: 54
End: 2024-02-22
Payer: COMMERCIAL

## 2024-02-22 ENCOUNTER — OFFICE VISIT (OUTPATIENT)
Dept: SURGICAL ONCOLOGY | Facility: CLINIC | Age: 54
End: 2024-02-22
Payer: COMMERCIAL

## 2024-02-22 VITALS — DIASTOLIC BLOOD PRESSURE: 93 MMHG | SYSTOLIC BLOOD PRESSURE: 143 MMHG | HEART RATE: 120 BPM | TEMPERATURE: 98.2 F

## 2024-02-22 DIAGNOSIS — N64.89 SEROMA OF BREAST: ICD-10-CM

## 2024-02-22 DIAGNOSIS — Z17.0 MALIGNANT NEOPLASM OF UPPER-OUTER QUADRANT OF LEFT BREAST IN FEMALE, ESTROGEN RECEPTOR POSITIVE (MULTI): ICD-10-CM

## 2024-02-22 DIAGNOSIS — L03.112 CELLULITIS OF LEFT AXILLA: ICD-10-CM

## 2024-02-22 DIAGNOSIS — I10 PRIMARY HYPERTENSION: ICD-10-CM

## 2024-02-22 DIAGNOSIS — Z17.0 ESTROGEN RECEPTOR POSITIVE STATUS (ER+): ICD-10-CM

## 2024-02-22 DIAGNOSIS — C50.412 MALIGNANT NEOPLASM OF UPPER-OUTER QUADRANT OF LEFT BREAST IN FEMALE, ESTROGEN RECEPTOR POSITIVE (MULTI): ICD-10-CM

## 2024-02-22 DIAGNOSIS — Z90.12 STATUS POST PARTIAL MASTECTOMY OF LEFT BREAST: ICD-10-CM

## 2024-02-22 DIAGNOSIS — L02.91 ABSCESS: Primary | ICD-10-CM

## 2024-02-22 DIAGNOSIS — Z80.3 FAMILY HISTORY OF MALIGNANT NEOPLASM OF BREAST: ICD-10-CM

## 2024-02-22 PROCEDURE — 87186 SC STD MICRODIL/AGAR DIL: CPT | Mod: AHULAB | Performed by: SURGERY

## 2024-02-22 PROCEDURE — 10021 FNA BX W/O IMG GDN 1ST LES: CPT | Performed by: SURGERY

## 2024-02-22 PROCEDURE — 3077F SYST BP >= 140 MM HG: CPT | Performed by: SURGERY

## 2024-02-22 PROCEDURE — 99214 OFFICE O/P EST MOD 30 MIN: CPT | Performed by: SURGERY

## 2024-02-22 PROCEDURE — 3080F DIAST BP >= 90 MM HG: CPT | Performed by: SURGERY

## 2024-02-22 PROCEDURE — 1036F TOBACCO NON-USER: CPT | Performed by: SURGERY

## 2024-02-22 RX ORDER — SULFAMETHOXAZOLE AND TRIMETHOPRIM 800; 160 MG/1; MG/1
1 TABLET ORAL 2 TIMES DAILY
Qty: 20 TABLET | Refills: 0 | Status: SHIPPED | OUTPATIENT
Start: 2024-02-22 | End: 2024-02-29 | Stop reason: ALTCHOICE

## 2024-02-22 RX ORDER — SULFAMETHOXAZOLE AND TRIMETHOPRIM 800; 160 MG/1; MG/1
2 TABLET ORAL 2 TIMES DAILY
Qty: 40 TABLET | Refills: 0 | Status: CANCELLED | OUTPATIENT
Start: 2024-02-22 | End: 2024-03-03

## 2024-02-23 ENCOUNTER — DOCUMENTATION (OUTPATIENT)
Dept: GENETICS | Facility: CLINIC | Age: 54
End: 2024-02-23
Payer: COMMERCIAL

## 2024-02-23 DIAGNOSIS — Z13.71 BRCA NEGATIVE: ICD-10-CM

## 2024-02-23 NOTE — PROGRESS NOTES
Cancer Genetics Chart Update (telephone results note):  Called and spoke with Bisi Reynaga regarding her larger hereditary cancer panel test results, which were NEGATIVE (normal). Ms. Reynaga was last seen on 2024 at which time she chose to pursue testing for hereditary cancer as part of smaller (BRCAPlus) and larger (CancerNext) panels. The smaller panel results were previously reviewed with her by phone on 24, and the larger panel results today. She has access to her results via her  [employer].    Family history (as previously noted):  -Patient, breast cancer at age 53 per the above;  -Maternal aunt, breast cancer at age 50, alive at 91;  -Maternal great aunt (through [facility]), breast cancer at age 50 ( at age 70);  -Father, prostate cancer at 58, alive at age 78;  -Paternal grandfather, colon cancer at age 60 ( at age 65);  -Paternal grandmother, colon cancer at age 62 ( at age 65).     Ms. Reynaga is white, There is no known Ashkenazi Evangelical ancestry or consanguinity.    Genetic test results: Negative 36-gene [place] CancerNext panel. Results are summarized below at the report is attached to this encounter.    Genetic counseling:  Ms. Reynaga results were NEGATIVE, meaning that no disease causing mutations were identified. A genetic cause for her personal and family history of breast cancer has not been identified.    One reason Ms. Reynaga underwent genetic testing was to better understand her risk to develop a second breast cancer to help determine if further breast screening or surgery is indicated. Because her results were negative, Ms. Reynaga does not have an identifiable genetic risk factor that places her at an increased risk to develop a second breast cancer. She should continue being followed by her breast cancer care team.    It was previously discussed that women with BRCA1 and BRCA2 mutations have an increased risk for ovarian cancer. With negative test results and no family history of  "ovarian cancer, Ms. Reynaga is not considered at increased risk for this cancer type from a genetic standpoint. Prophylactic surgery is not indicated from a genetic standpoint.    We also discussed that Ms. Reynaga should follow her primary care providers' recommendations for all other age-related cancer screenings, such as colonoscopies, etc.    Although their results were negative, Ms. Reynaga's close female relatives are considered to have an increased risk to develop breast cancer over the general population, based on the family history alone. They should speak to their healthcare providers about their breast cancer screening protocols, as they may be a candidate for annual breast MRIs, in addition to an annual mammogram and clinical breast exam, if their lifetime risk of breast cancer reaches or exceeds 20%. Breast cancer screening should also begin 10 years younger than the youngest diagnosis in the family, or age 40, whichever comes first. This would be age 40 for Ms. Reynaga's relatives. She plans to share this information with their relatives.    Ms. Reynaga's maternal relatives are still candidates for genetic testing themselves, as the family history of breast cancer remains unexplained.  There may be a gene mutation, like a BRCA1 or BRCA2 mutation, in the family that Ms. Reynaga did not inherit. If her relatives are local, we would be glad to see them here at .  They can call [phone], option 1, to schedule an appointment.  If they live outside the [facility], their relatives can find a genetics specialist in their area by visiting the National Society for Genetic Counselors website at: <http://www.nsgc.org/> under \"Find a Genetic Counselor,\" with \"Cancer\" specified under special area.     A brief family history of Ms. Reynaga children's father was obtained. Ms. Reynaga is not aware that he has any family history of cancer, so no further genetic testing or additional cancer screenings are recommended " for their children.    Our understanding of genetic contribution to cancer diagnoses is always evolving, so there may be additional testing recommended in the future. Ms. Reynaga was asked to keep us apprised as to any changes to their personal and/or family history of cancer, and to contact us in 2-3 years to determine if there have been any changes since our discussion today.    Chelita Serna MS, [facility]  Genetic Counselor  [facility] [facility]  [phone]    Reviewed by:  Shira Medina MD  Clinical   [facility]  [phone]

## 2024-02-25 ENCOUNTER — HOSPITAL ENCOUNTER (EMERGENCY)
Facility: HOSPITAL | Age: 54
Discharge: HOME | End: 2024-02-25
Attending: STUDENT IN AN ORGANIZED HEALTH CARE EDUCATION/TRAINING PROGRAM
Payer: COMMERCIAL

## 2024-02-25 VITALS
OXYGEN SATURATION: 98 % | DIASTOLIC BLOOD PRESSURE: 75 MMHG | BODY MASS INDEX: 25.92 KG/M2 | HEART RATE: 85 BPM | WEIGHT: 175 LBS | SYSTOLIC BLOOD PRESSURE: 122 MMHG | HEIGHT: 69 IN | TEMPERATURE: 97.2 F | RESPIRATION RATE: 16 BRPM

## 2024-02-25 DIAGNOSIS — L02.91 ABSCESS: Primary | ICD-10-CM

## 2024-02-25 LAB
ALBUMIN SERPL BCP-MCNC: 3.8 G/DL (ref 3.4–5)
ALP SERPL-CCNC: 212 U/L (ref 33–110)
ALT SERPL W P-5'-P-CCNC: 109 U/L (ref 7–45)
ANION GAP SERPL CALC-SCNC: 14 MMOL/L (ref 10–20)
AST SERPL W P-5'-P-CCNC: 36 U/L (ref 9–39)
BACTERIA SPEC CULT: ABNORMAL
BASOPHILS # BLD AUTO: 0.03 X10*3/UL (ref 0–0.1)
BASOPHILS NFR BLD AUTO: 0.5 %
BILIRUB SERPL-MCNC: 0.6 MG/DL (ref 0–1.2)
BUN SERPL-MCNC: 11 MG/DL (ref 6–23)
CALCIUM SERPL-MCNC: 9.5 MG/DL (ref 8.6–10.3)
CHLORIDE SERPL-SCNC: 104 MMOL/L (ref 98–107)
CO2 SERPL-SCNC: 25 MMOL/L (ref 21–32)
CREAT SERPL-MCNC: 0.73 MG/DL (ref 0.5–1.05)
EGFRCR SERPLBLD CKD-EPI 2021: >90 ML/MIN/1.73M*2
EOSINOPHIL # BLD AUTO: 0.04 X10*3/UL (ref 0–0.7)
EOSINOPHIL NFR BLD AUTO: 0.6 %
ERYTHROCYTE [DISTWIDTH] IN BLOOD BY AUTOMATED COUNT: 13.1 % (ref 11.5–14.5)
GLUCOSE SERPL-MCNC: 88 MG/DL (ref 74–99)
GRAM STN SPEC: ABNORMAL
GRAM STN SPEC: ABNORMAL
HCT VFR BLD AUTO: 37.9 % (ref 36–46)
HGB BLD-MCNC: 12.8 G/DL (ref 12–16)
IMM GRANULOCYTES # BLD AUTO: 0.02 X10*3/UL (ref 0–0.7)
IMM GRANULOCYTES NFR BLD AUTO: 0.3 % (ref 0–0.9)
LYMPHOCYTES # BLD AUTO: 1.17 X10*3/UL (ref 1.2–4.8)
LYMPHOCYTES NFR BLD AUTO: 18.9 %
MCH RBC QN AUTO: 29.5 PG (ref 26–34)
MCHC RBC AUTO-ENTMCNC: 33.8 G/DL (ref 32–36)
MCV RBC AUTO: 87 FL (ref 80–100)
MONOCYTES # BLD AUTO: 0.52 X10*3/UL (ref 0.1–1)
MONOCYTES NFR BLD AUTO: 8.4 %
NEUTROPHILS # BLD AUTO: 4.41 X10*3/UL (ref 1.2–7.7)
NEUTROPHILS NFR BLD AUTO: 71.3 %
NRBC BLD-RTO: 0 /100 WBCS (ref 0–0)
PLATELET # BLD AUTO: 447 X10*3/UL (ref 150–450)
POTASSIUM SERPL-SCNC: 3.8 MMOL/L (ref 3.5–5.3)
PROT SERPL-MCNC: 7.3 G/DL (ref 6.4–8.2)
RBC # BLD AUTO: 4.34 X10*6/UL (ref 4–5.2)
SODIUM SERPL-SCNC: 139 MMOL/L (ref 136–145)
WBC # BLD AUTO: 6.2 X10*3/UL (ref 4.4–11.3)

## 2024-02-25 PROCEDURE — 36415 COLL VENOUS BLD VENIPUNCTURE: CPT | Performed by: STUDENT IN AN ORGANIZED HEALTH CARE EDUCATION/TRAINING PROGRAM

## 2024-02-25 PROCEDURE — 96374 THER/PROPH/DIAG INJ IV PUSH: CPT | Mod: 59

## 2024-02-25 PROCEDURE — 2500000001 HC RX 250 WO HCPCS SELF ADMINISTERED DRUGS (ALT 637 FOR MEDICARE OP): Performed by: STUDENT IN AN ORGANIZED HEALTH CARE EDUCATION/TRAINING PROGRAM

## 2024-02-25 PROCEDURE — 99284 EMERGENCY DEPT VISIT MOD MDM: CPT | Mod: 25

## 2024-02-25 PROCEDURE — 80053 COMPREHEN METABOLIC PANEL: CPT | Performed by: STUDENT IN AN ORGANIZED HEALTH CARE EDUCATION/TRAINING PROGRAM

## 2024-02-25 PROCEDURE — 10140 I&D HMTMA SEROMA/FLUID COLLJ: CPT | Performed by: STUDENT IN AN ORGANIZED HEALTH CARE EDUCATION/TRAINING PROGRAM

## 2024-02-25 PROCEDURE — 2500000004 HC RX 250 GENERAL PHARMACY W/ HCPCS (ALT 636 FOR OP/ED): Performed by: STUDENT IN AN ORGANIZED HEALTH CARE EDUCATION/TRAINING PROGRAM

## 2024-02-25 PROCEDURE — 85025 COMPLETE CBC W/AUTO DIFF WBC: CPT | Performed by: STUDENT IN AN ORGANIZED HEALTH CARE EDUCATION/TRAINING PROGRAM

## 2024-02-25 RX ORDER — DOXYCYCLINE 100 MG/1
100 TABLET ORAL 2 TIMES DAILY
Qty: 14 TABLET | Refills: 0 | Status: SHIPPED | OUTPATIENT
Start: 2024-02-25 | End: 2024-03-03

## 2024-02-25 RX ORDER — DOXYCYCLINE HYCLATE 100 MG
100 TABLET ORAL ONCE
Status: COMPLETED | OUTPATIENT
Start: 2024-02-25 | End: 2024-02-25

## 2024-02-25 RX ORDER — KETOROLAC TROMETHAMINE 30 MG/ML
15 INJECTION, SOLUTION INTRAMUSCULAR; INTRAVENOUS ONCE
Status: COMPLETED | OUTPATIENT
Start: 2024-02-25 | End: 2024-02-25

## 2024-02-25 RX ADMIN — DOXYCYCLINE HYCLATE 100 MG: 100 TABLET, COATED ORAL at 09:41

## 2024-02-25 RX ADMIN — KETOROLAC TROMETHAMINE 15 MG: 30 INJECTION, SOLUTION INTRAMUSCULAR; INTRAVENOUS at 09:41

## 2024-02-25 ASSESSMENT — PAIN DESCRIPTION - LOCATION: LOCATION: OTHER (COMMENT)

## 2024-02-25 ASSESSMENT — COLUMBIA-SUICIDE SEVERITY RATING SCALE - C-SSRS
1. IN THE PAST MONTH, HAVE YOU WISHED YOU WERE DEAD OR WISHED YOU COULD GO TO SLEEP AND NOT WAKE UP?: NO
6. HAVE YOU EVER DONE ANYTHING, STARTED TO DO ANYTHING, OR PREPARED TO DO ANYTHING TO END YOUR LIFE?: NO
2. HAVE YOU ACTUALLY HAD ANY THOUGHTS OF KILLING YOURSELF?: NO

## 2024-02-25 ASSESSMENT — PAIN DESCRIPTION - ORIENTATION
ORIENTATION: LEFT
ORIENTATION: LEFT

## 2024-02-25 ASSESSMENT — PAIN - FUNCTIONAL ASSESSMENT: PAIN_FUNCTIONAL_ASSESSMENT: 0-10

## 2024-02-25 ASSESSMENT — PAIN SCALES - GENERAL
PAINLEVEL_OUTOF10: 5 - MODERATE PAIN
PAINLEVEL_OUTOF10: 6

## 2024-02-25 ASSESSMENT — PAIN DESCRIPTION - DESCRIPTORS
DESCRIPTORS: SORE
DESCRIPTORS: SORE

## 2024-02-25 ASSESSMENT — PAIN DESCRIPTION - PAIN TYPE
TYPE: ACUTE PAIN
TYPE: ACUTE PAIN

## 2024-02-25 ASSESSMENT — LIFESTYLE VARIABLES
EVER HAD A DRINK FIRST THING IN THE MORNING TO STEADY YOUR NERVES TO GET RID OF A HANGOVER: NO
EVER FELT BAD OR GUILTY ABOUT YOUR DRINKING: NO
HAVE YOU EVER FELT YOU SHOULD CUT DOWN ON YOUR DRINKING: NO
HAVE PEOPLE ANNOYED YOU BY CRITICIZING YOUR DRINKING: NO

## 2024-02-25 ASSESSMENT — PAIN DESCRIPTION - FREQUENCY
FREQUENCY: CONSTANT/CONTINUOUS
FREQUENCY: CONSTANT/CONTINUOUS

## 2024-02-25 NOTE — ED PROVIDER NOTES
HPI   Chief Complaint   Patient presents with    Abscess     Pt had lymph nodes removed 02/07/24 under left axilla - wound drained Thurs. At 's office after it developed same swelling as today and she was diagnosed with an infection - It has since filled back up/ swollen, is red, and extremely uncomfortable - Bactrim given that is not working, ice is also not helping       HPI     Patient is a 54-year-old female present to the emergency department for evaluation in the setting of pain in the left axilla.  She recently had breast surgery with Dr. Herrera, some lymph nodes were removed in that setting.  On Thursday she saw Dr. Herrera in the office given she had some swelling and redness around the area which was thought to be may be a seroma however when drained Dr. Herrera stated concern of possible infection and started her on Bactrim.  She has been taking the Bactrim which she states the redness has spread down beyond the initial area.  She states it hurts to range her arm as well.  No fevers.  No significant medical history otherwise.               No data recorded                   Patient History   Past Medical History:   Diagnosis Date    Personal history of urinary calculi 09/25/2014    Personal history of renal calculi     Past Surgical History:   Procedure Laterality Date    BI US GUIDED BREAST LOCALIZATION AND BIOPSY LEFT Left 12/29/2023    BI US GUIDED BREAST LOCALIZATION AND BIOPSY LEFT 12/29/2023 Adriana So MD [facility]     Family History   Problem Relation Name Age of Onset    Breast cancer Other  40        great maternal aunt    Breast cancer Mother's Sister  40     Social History     Tobacco Use    Smoking status: Never     Passive exposure: Never    Smokeless tobacco: Never   Vaping Use    Vaping Use: Never used   Substance Use Topics    Alcohol use: Yes    Drug use: Never       Physical Exam   ED Triage Vitals [02/25/24 0810]   Temperature Heart Rate Respirations BP   36.2 °C (97.2 °F) 95 18  123/82      Pulse Ox Temp src Heart Rate Source Patient Position   96 % -- Monitor Sitting      BP Location FiO2 (%)     Right arm --       Physical Exam  Vitals and nursing note reviewed.   Constitutional:       Appearance: Normal appearance.   HENT:      Head: Normocephalic.      Mouth/Throat:      Mouth: Mucous membranes are moist.   Eyes:      Conjunctiva/sclera: Conjunctivae normal.   Cardiovascular:      Rate and Rhythm: Normal rate.   Pulmonary:      Effort: Pulmonary effort is normal.   Abdominal:      General: Abdomen is flat.   Skin:     General: Skin is warm.      Comments: Tenderness to the left axilla, induration noted, small area of fluctuance on the lateral aspect, no active discharge   Neurological:      Mental Status: She is alert and oriented to person, place, and time.   Psychiatric:         Mood and Affect: Mood normal.         ED Course & MDM   Diagnoses as of 02/25/24 1650   Abscess       Medical Decision Making  54-year-old female presenting as above on arrival here hemodynamically stable afebrile, nontachycardic and normotensive.  She is sitting up on bedside, nontoxic, conversant and comfortable.  She has incision to the left axilla with some surrounding induration and redness.  No crepitus appreciated.  Possible area of fluctuance which I discussed with her ultrasound for evaluation, labs as well will be obtained and patient given doxycycline here.  Discussed with her I would message Dr. Herrera to discuss her case.    Labs reviewed reassuring without leukocytosis or derangement otherwise.  Point-of-care ultrasound demonstrates a pocket of fluid in the lateral aspect of the surgical site discussed with patient surgeon, Dr. Herrera, agreeable with plan for needle aspiration and transition to doxycycline.  Got approximately 15 cc of serosanguineous fluid with some component of purulence as well.  Patient feels significantly improved following.  Will be discharged, close outpatient follow-up  early this week as scheduled.    Procedure  Incision and Drainage    Performed by: Danica Moreno MD  Authorized by: Danica Moreno MD    Consent:     Consent obtained:  Verbal    Consent given by:  Patient    Risks, benefits, and alternatives were discussed: yes    Universal protocol:     Patient identity confirmed:  Verbally with patient  Location:     Type:  Seroma    Location:  Upper extremity    Upper extremity location: left axilla.  Sedation:     Sedation type:  None  Anesthesia:     Anesthesia method:  Local infiltration  Procedure type:     Complexity:  Simple  Procedure details:     Needle aspiration: yes      Needle size:  18 G    Drainage:  Bloody, purulent and serosanguinous (15 cc)    Drainage amount:  Moderate  Post-procedure details:     Procedure completion:  Tolerated       Danica Moreno MD  02/25/24 0981

## 2024-02-26 ENCOUNTER — TELEPHONE (OUTPATIENT)
Dept: SURGICAL ONCOLOGY | Facility: CLINIC | Age: 54
End: 2024-02-26
Payer: COMMERCIAL

## 2024-02-28 ENCOUNTER — APPOINTMENT (OUTPATIENT)
Dept: RADIATION ONCOLOGY | Facility: CLINIC | Age: 54
End: 2024-02-28
Payer: COMMERCIAL

## 2024-02-28 PROBLEM — Z80.3 FAMILY HISTORY OF MALIGNANT NEOPLASM OF BREAST: Status: ACTIVE | Noted: 2024-01-22

## 2024-02-28 PROBLEM — R10.9 STOMACH ACHE: Status: ACTIVE | Noted: 2024-02-28

## 2024-02-28 PROBLEM — J30.9 ALLERGIC RHINITIS: Status: ACTIVE | Noted: 2024-02-28

## 2024-02-28 PROBLEM — Z17.0 ESTROGEN RECEPTOR POSITIVE STATUS (ER+): Status: ACTIVE | Noted: 2024-01-09

## 2024-02-28 PROBLEM — R53.83 FATIGUE: Status: ACTIVE | Noted: 2024-02-28

## 2024-02-28 PROBLEM — M75.01 ADHESIVE CAPSULITIS OF RIGHT SHOULDER: Status: ACTIVE | Noted: 2024-02-28

## 2024-02-28 PROBLEM — M25.531 WRIST PAIN, ACUTE, RIGHT: Status: ACTIVE | Noted: 2024-02-28

## 2024-02-28 PROBLEM — J20.9 ACUTE BRONCHITIS: Status: ACTIVE | Noted: 2024-02-28

## 2024-02-28 PROBLEM — N84.0 ENDOMETRIAL POLYP: Status: ACTIVE | Noted: 2024-02-28

## 2024-02-28 PROBLEM — N83.209 OVARIAN CYST: Status: ACTIVE | Noted: 2024-02-28

## 2024-02-28 PROBLEM — F43.9 STRESS: Status: ACTIVE | Noted: 2024-02-28

## 2024-02-28 PROBLEM — J01.90 ACUTE SUPPURATIVE SINUSITIS: Status: ACTIVE | Noted: 2024-02-28

## 2024-02-28 PROBLEM — R93.89 THICKENED ENDOMETRIUM: Status: ACTIVE | Noted: 2024-02-28

## 2024-02-28 PROBLEM — E55.9 VITAMIN D DEFICIENCY: Status: ACTIVE | Noted: 2024-02-28

## 2024-02-28 PROBLEM — H66.90 OTITIS MEDIA, ACUTE: Status: ACTIVE | Noted: 2024-02-28

## 2024-02-28 PROBLEM — M79.672 LEFT FOOT PAIN: Status: ACTIVE | Noted: 2024-02-28

## 2024-02-28 PROBLEM — S93.602A FOOT SPRAIN, LEFT, INITIAL ENCOUNTER: Status: ACTIVE | Noted: 2024-02-28

## 2024-02-28 PROBLEM — R63.5 WEIGHT GAIN: Status: ACTIVE | Noted: 2024-02-28

## 2024-02-28 PROBLEM — R05.1 ACUTE COUGH: Status: ACTIVE | Noted: 2024-02-10

## 2024-02-28 PROBLEM — M77.8 SHOULDER CAPSULITIS, RIGHT: Status: ACTIVE | Noted: 2024-02-28

## 2024-02-28 PROBLEM — K29.70 GASTRITIS: Status: ACTIVE | Noted: 2024-02-28

## 2024-02-28 PROBLEM — N93.9 ABNORMAL UTERINE BLEEDING (AUB): Status: ACTIVE | Noted: 2024-02-28

## 2024-02-28 RX ORDER — OXYCODONE AND ACETAMINOPHEN 5; 325 MG/1; MG/1
1 TABLET ORAL EVERY 4 HOURS PRN
COMMUNITY
Start: 2015-03-17 | End: 2024-03-26 | Stop reason: ALTCHOICE

## 2024-02-28 RX ORDER — CETIRIZINE HYDROCHLORIDE 10 MG/1
10 TABLET ORAL
COMMUNITY

## 2024-02-28 RX ORDER — ONDANSETRON 4 MG/1
TABLET, ORALLY DISINTEGRATING ORAL
COMMUNITY
Start: 2021-09-16 | End: 2024-05-02 | Stop reason: WASHOUT

## 2024-02-28 RX ORDER — BROMPHENIRAMINE MALEATE, PSEUDOEPHEDRINE HYDROCHLORIDE, AND DEXTROMETHORPHAN HYDROBROMIDE 2; 30; 10 MG/5ML; MG/5ML; MG/5ML
SYRUP ORAL
COMMUNITY
Start: 2024-02-10 | End: 2024-06-03 | Stop reason: ALTCHOICE

## 2024-02-28 RX ORDER — LEVONORGESTREL AND ETHINYL ESTRADIOL 6-5-10
1 KIT ORAL DAILY
COMMUNITY
Start: 2016-12-22 | End: 2024-06-03 | Stop reason: ALTCHOICE

## 2024-02-28 RX ORDER — KETOROLAC TROMETHAMINE 10 MG/1
1 TABLET, FILM COATED ORAL EVERY 6 HOURS PRN
COMMUNITY
Start: 2023-01-13 | End: 2024-06-03 | Stop reason: ALTCHOICE

## 2024-02-28 RX ORDER — IBUPROFEN 600 MG/1
TABLET ORAL EVERY 6 HOURS
COMMUNITY
Start: 2020-09-17 | End: 2024-06-03 | Stop reason: ALTCHOICE

## 2024-02-28 RX ORDER — TAMSULOSIN HYDROCHLORIDE 0.4 MG/1
0.4 CAPSULE ORAL
COMMUNITY
Start: 2023-01-13 | End: 2024-05-02 | Stop reason: WASHOUT

## 2024-02-29 ENCOUNTER — OFFICE VISIT (OUTPATIENT)
Dept: SURGICAL ONCOLOGY | Facility: CLINIC | Age: 54
End: 2024-02-29
Payer: COMMERCIAL

## 2024-02-29 ENCOUNTER — APPOINTMENT (OUTPATIENT)
Dept: OBSTETRICS AND GYNECOLOGY | Facility: CLINIC | Age: 54
End: 2024-02-29
Payer: COMMERCIAL

## 2024-02-29 ENCOUNTER — APPOINTMENT (OUTPATIENT)
Dept: SURGICAL ONCOLOGY | Facility: CLINIC | Age: 54
End: 2024-02-29
Payer: COMMERCIAL

## 2024-02-29 ENCOUNTER — OFFICE VISIT (OUTPATIENT)
Dept: PRIMARY CARE | Facility: CLINIC | Age: 54
End: 2024-02-29
Payer: COMMERCIAL

## 2024-02-29 VITALS
RESPIRATION RATE: 16 BRPM | HEIGHT: 69 IN | SYSTOLIC BLOOD PRESSURE: 140 MMHG | TEMPERATURE: 97.9 F | HEART RATE: 82 BPM | WEIGHT: 176 LBS | OXYGEN SATURATION: 96 % | DIASTOLIC BLOOD PRESSURE: 84 MMHG | BODY MASS INDEX: 26.07 KG/M2

## 2024-02-29 VITALS
TEMPERATURE: 97.5 F | SYSTOLIC BLOOD PRESSURE: 145 MMHG | BODY MASS INDEX: 25.99 KG/M2 | DIASTOLIC BLOOD PRESSURE: 90 MMHG | WEIGHT: 176 LBS | HEART RATE: 78 BPM

## 2024-02-29 DIAGNOSIS — F43.9 STRESS: ICD-10-CM

## 2024-02-29 DIAGNOSIS — C50.412 MALIGNANT NEOPLASM OF UPPER-OUTER QUADRANT OF LEFT FEMALE BREAST, UNSPECIFIED ESTROGEN RECEPTOR STATUS (MULTI): Primary | ICD-10-CM

## 2024-02-29 DIAGNOSIS — C50.412 MALIGNANT NEOPLASM OF UPPER-OUTER QUADRANT OF LEFT BREAST IN FEMALE, ESTROGEN RECEPTOR POSITIVE (MULTI): ICD-10-CM

## 2024-02-29 DIAGNOSIS — Z17.0 MALIGNANT NEOPLASM OF UPPER-OUTER QUADRANT OF LEFT BREAST IN FEMALE, ESTROGEN RECEPTOR POSITIVE (MULTI): ICD-10-CM

## 2024-02-29 DIAGNOSIS — I10 PRIMARY HYPERTENSION: ICD-10-CM

## 2024-02-29 DIAGNOSIS — N64.89 SEROMA OF BREAST: ICD-10-CM

## 2024-02-29 DIAGNOSIS — L03.112 CELLULITIS OF LEFT AXILLA: Primary | ICD-10-CM

## 2024-02-29 DIAGNOSIS — R53.81 MALAISE AND FATIGUE: ICD-10-CM

## 2024-02-29 DIAGNOSIS — E78.5 DYSLIPIDEMIA: ICD-10-CM

## 2024-02-29 DIAGNOSIS — Z17.0 ESTROGEN RECEPTOR POSITIVE STATUS (ER+): ICD-10-CM

## 2024-02-29 DIAGNOSIS — R53.83 MALAISE AND FATIGUE: ICD-10-CM

## 2024-02-29 DIAGNOSIS — K21.00 GASTROESOPHAGEAL REFLUX DISEASE WITH ESOPHAGITIS WITHOUT HEMORRHAGE: ICD-10-CM

## 2024-02-29 DIAGNOSIS — L02.91 ABSCESS: ICD-10-CM

## 2024-02-29 DIAGNOSIS — Z90.12 STATUS POST PARTIAL MASTECTOMY OF LEFT BREAST: ICD-10-CM

## 2024-02-29 DIAGNOSIS — R92.8 ABNORMAL FINDING ON BREAST IMAGING: ICD-10-CM

## 2024-02-29 DIAGNOSIS — E55.9 VITAMIN D DEFICIENCY: ICD-10-CM

## 2024-02-29 DIAGNOSIS — R05.8 ALLERGIC COUGH: ICD-10-CM

## 2024-02-29 DIAGNOSIS — R59.0 AXILLARY LYMPHADENOPATHY: ICD-10-CM

## 2024-02-29 PROCEDURE — 99213 OFFICE O/P EST LOW 20 MIN: CPT | Mod: 24 | Performed by: SURGERY

## 2024-02-29 PROCEDURE — 1036F TOBACCO NON-USER: CPT | Performed by: SURGERY

## 2024-02-29 PROCEDURE — 99213 OFFICE O/P EST LOW 20 MIN: CPT | Performed by: SURGERY

## 2024-02-29 PROCEDURE — 3077F SYST BP >= 140 MM HG: CPT | Performed by: FAMILY MEDICINE

## 2024-02-29 PROCEDURE — 1036F TOBACCO NON-USER: CPT | Performed by: FAMILY MEDICINE

## 2024-02-29 PROCEDURE — 10021 FNA BX W/O IMG GDN 1ST LES: CPT | Performed by: SURGERY

## 2024-02-29 PROCEDURE — 10004 FNA BX W/O IMG GDN EA ADDL: CPT | Performed by: SURGERY

## 2024-02-29 PROCEDURE — 3077F SYST BP >= 140 MM HG: CPT | Performed by: SURGERY

## 2024-02-29 PROCEDURE — 99214 OFFICE O/P EST MOD 30 MIN: CPT | Performed by: FAMILY MEDICINE

## 2024-02-29 PROCEDURE — 3080F DIAST BP >= 90 MM HG: CPT | Performed by: SURGERY

## 2024-02-29 PROCEDURE — 3079F DIAST BP 80-89 MM HG: CPT | Performed by: FAMILY MEDICINE

## 2024-02-29 RX ORDER — DOXYCYCLINE 100 MG/1
100 CAPSULE ORAL 2 TIMES DAILY
Qty: 14 CAPSULE | Refills: 0 | Status: SHIPPED | OUTPATIENT
Start: 2024-02-29 | End: 2024-03-07

## 2024-02-29 ASSESSMENT — PATIENT HEALTH QUESTIONNAIRE - PHQ9
2. FEELING DOWN, DEPRESSED OR HOPELESS: NOT AT ALL
1. LITTLE INTEREST OR PLEASURE IN DOING THINGS: NOT AT ALL
SUM OF ALL RESPONSES TO PHQ9 QUESTIONS 1 AND 2: 0

## 2024-02-29 ASSESSMENT — PAIN SCALES - GENERAL
PAINLEVEL: 0-NO PAIN
PAINLEVEL: 4

## 2024-02-29 NOTE — PROGRESS NOTES
"Subjective     JOZEF Reynaga \"Tanya\" is a pleasant 54 y.o. female s/p left breast Magseed localized partial mastectomy, left axillary SLNB with dual tracer and Magseed localization on 24 for left breast invasive ductal carcinoma, grade 3, ER + >95%, OK + 10%, HER2 negative, [id number],  clinical stage IIA. Final pathology showed invasive ductal carcinoma and DCIS, grade 3, 3 cm, negative margins, 1/4 lymph nodes with macrometastasis (10 mm with extranodal extension), ER + >95%, OK + 10%, HER2 negative [id number], stage IIA. She returns today for follow up of left axillary abscess.      BREAST IMAGIN2023 Bilateral diagnostic mammogram with bilateral ultrasound, indicates BI-RADS Category 4. Suspicious left breast mass with axillary lymphadenopathy. Ultrasound-guided biopsy of the left breast mass at 1:00 and the axillary lymph node labelled #1 is recommended. Additional normal-appearing lymph nodes visualized.      On 23 Left breast, ultrasound-guided core biopsy showed invasive ductal carcinoma, grade 3. Left lymph node showed metastatic carcinoma. ER + >95%, OK + 10%, HER2 pending,     She underwent genetic testing which was negative for deleterious mutations.     She was seen on 24 postoperatively and purulent fluid was aspirated from the axilla. Cultures yielded MSSA. Bactrim DS was prescribed and 1 week follow up planned.     On 2024 she presented to the [place] ED with axillary pain and swelling. Aspiration performed, which yielded mixed purulent and serosanguineous fluid, again sent for culture. Her antibiotic was switched to doxycycline.     She reports symptoms have improved. Today she notes some swelling but less erythema and pain.     REPRODUCTIVE HISTORY:  menarche age 12, , first birth age 28, did not breastfeed, OCP's over 20 years, natural menopause age 52, no HRT, scattered fibroglandular tissue     FAMILY CANCER HISTORY:   Maternal Aunt: Breast cancer, age " 40  Maternal Great Aunt: Breast cancer, age 45  Paternal Grandmother: Colon cancer, age 65  Father: Prostate cancer, age 58 and skin cancer, age 60    Past Medical History:   Diagnosis Date    Personal history of urinary calculi 09/25/2014    Personal history of renal calculi     Current Outpatient Medications on File Prior to Visit   Medication Sig Dispense Refill    brompheniramine-pseudoeph-DM 2-30-10 mg/5 mL syrup take 5 to 10 ml by mouth every 4 hours for 4 days.      cetirizine (ZyrTEC) 10 mg tablet Take 1 tablet (10 mg) by mouth.      doxycycline (Adoxa) 100 mg tablet Take 1 tablet (100 mg) by mouth 2 times a day for 7 days. Take with a full glass of water and do not lie down for at least 30 minutes after 14 tablet 0    ibuprofen 600 mg tablet Take by mouth every 6 hours.      ketorolac (Toradol) 10 mg tablet Take 1 tablet (10 mg) by mouth every 6 hours if needed.      levonorg-eth estrad triphasic (Enpresse) 50-30 (6)/75-40 (5)/125-30(10) per tablet Take 1 tablet by mouth once daily.      montelukast (Singulair) 10 mg tablet TAKE ONE TABLET BY MOUTH EVERY DAY 90 tablet 0    multivit-min/ferrous fumarate (MULTI VITAMIN ORAL) Take by mouth.      ondansetron ODT (Zofran-ODT) 4 mg disintegrating tablet Take by mouth.      oxyCODONE-acetaminophen (Percocet) 5-325 mg tablet Take 1 tablet by mouth every 4 hours if needed.      pantoprazole (ProtoNix) 40 mg EC tablet TAKE ONE TABLET BY MOUTH EVERY DAY 30 tablet 3    tamsulosin (Flomax) 0.4 mg 24 hr capsule Take 1 capsule (0.4 mg) by mouth once daily.      traMADol (Ultram) 50 mg tablet Take 1 tablet (50 mg) by mouth every 6 hours if needed for severe pain (7 - 10). 15 tablet 0    [DISCONTINUED] sulfamethoxazole-trimethoprim (Bactrim DS) 800-160 mg tablet Take 1 tablet by mouth 2 times a day for 10 days. 20 tablet 0     No current facility-administered medications on file prior to visit.     Review of Systems    Objective   Physical Exam  Physical Exam  General:  Otherwise healthy appearing. No acute distress.      HEENT: Conjunctiva well-colored, sclera non-icteric. Neck supple, trachea midline. No lymphadenopathy or thyromegaly.      Cardiovascular: Regular rate and rhythm.     Respiratory: Clear to auscultation bilaterally.      Breast: Exam performed in the sitting and supine positions.  Left breast: 1:00, 8 cm FN, well healed curvilinear incision, mild fullness; seroma aspirated. Well healed left axillary incision with mild surrounding erythema, decreased from previous; seroma aspirated.      Abdomen: Soft, non-tender, non-distended.     Extremities: Atraumatic, no edema.      Neurologic: Motor and sensory grossly intact. Alert and oriented.      Lymphatic: No axillary, supraclavicular, or infraclavicular lymphadenopathy bilaterally.     Procedure notes:    Left axillary seroma aspiration:  Verbal consent was obtained. The skin was sterilely prepared with chlorhexidine solution.  2 cc of 1% lidocaine were infiltrated subcutaneously. One pass was made with an 18-gauge needle. 16 cc of serosanguineous fluid were aspirated and discarded. The patient experienced immediate symptomatic relief. A sterile dressing was applied. Home going and wound care instructions were provided.    Left breast seroma aspiration:  Verbal consent was obtained. The skin was sterilely prepared with chlorhexidine solution.  2 cc of 1% lidocaine were infiltrated subcutaneously. One pass was made with an 18-gauge needle. 42 cc of serosanguineous fluid were aspirated and discarded. The patient experienced immediate symptomatic relief. A sterile dressing was applied. Home going and wound care instructions were provided.    Assessment/Plan   You are recovering very well from surgery and your incisions are healing well. There was a fluid collection called seroma, which was aspirated from the breast and axilla. If symptoms recur please call our office and we will aspirate this again.   The infection in  your axilla is improving. The fluid aspirated today was not infected.      Instructions:   1. Continue the arm exercises. Okay to gradually increase activity with your upper body.   2. Please continue to use Tylenol and/or ice for discomfort. Wear a supportive bra.   3. Please complete 10 days total of Doxycycline (additional prescription sent)     All questions were answered in detail, and we are in agreement with the following plan:   1. Please keep your appointment in medical oncology.   2. Please keep your appointment in radiation oncology.   3. Please return in August 2024 for clinical exam with Yuliana Schmitt CNP in breast cancer survivorship. You will be due for bilateral mammogram December 2024.      If you have any questions, concerns, or changes in your breast self-exam prior to our next visit, please call my office at the number below. Our breast care team looks forward to seeing you again soon.      Raegan Herrera MD   Breast Surgical Oncology   Department of Surgery [facility]   Office: [phone] (option #2)   Fax: [phone]      **DISCLAIMER** Speech recognition software was used to create portions of this document. While an attempt at proofreading has been made, minor errors in transcription may be present.  Diagnoses and all orders for this visit:  Cellulitis of left axilla  Abscess  Seroma of breast  Malignant neoplasm of upper-outer quadrant of left breast in female, estrogen receptor positive (CMS/HCC)  Status post partial mastectomy of left breast  Axillary lymphadenopathy  -     doxycycline (Vibramycin) 100 mg capsule; Take 1 capsule (100 mg) by mouth 2 times a day for 7 days. Take with at least 8 ounces (large glass) of water, do not lie down for 30 minutes after  Abnormal finding on breast imaging      Scribe Attestation  By signing my name below, Dee OLGUIN Scribe, attest that this documentation has been prepared under the direction and in the  presence of Raegan Herrera MD.

## 2024-02-29 NOTE — PROGRESS NOTES
"Susan Reynaga \"Tanya\" is a 54 y.o. female who presents for Follow-up (Follow up visit, patient recovering from breast cancer surgery ).    HPI  : Patient is a 54-year-old female who is recovering from left breast surgery and partial mastectomy for a left outer quadrant breast cancer.  She also has a seroma of the left axillary region where some lymph nodes were removed.  She currently is on doxycycline and the erythema and induration of the left axillary region is improving.  Patient is very optimistic and meets   with the medical oncologist next week.  She still has some discomfort in the left axillary region and hopefully the seromas will improve and also the cellulitis looks as if it is improving.  She is in for follow-up today and reevaluation of her gastroesophageal reflux and also blood pressure.      Objective  : ROS : 10 systems were reviewed and the information is included in the HPI and no additional review of systems is indicated.    Physical Exam  Vitals and nursing note reviewed.   Constitutional:       Appearance: Normal appearance.      Comments: Patient is alert and oriented x3.   No acute distress    HENT:      Head: Normocephalic.      Right Ear: Tympanic membrane and external ear normal.      Left Ear: Tympanic membrane and external ear normal.      Ears:      Comments: Ears are patent bilaterally and TMs are clear.     Nose: Rhinorrhea present.      Comments: Chronic allergic rhinitis.  Takes over-the-counter allergy pills and Singulair.     Mouth/Throat:      Mouth: Mucous membranes are moist.      Pharynx: Oropharynx is clear.      Comments: Mouth is moist, tongue is midline.  No posterior pharyngeal erythema.  Eyes:      Extraocular Movements: Extraocular movements intact.      Conjunctiva/sclera: Conjunctivae normal.      Pupils: Pupils are equal, round, and reactive to light.      Comments: No visual disturbance, does have her eyes examined once a year.   Neck:      Comments: No " carotid bruits, no thyromegaly, no cervical adenopathy.  Occasional neck spasm and restriction of motion secondary to stress and tension.  Cardiovascular:      Rate and Rhythm: Normal rate and regular rhythm.      Pulses: Normal pulses.      Heart sounds: Normal heart sounds.      Comments: Patient denies chest pain and no palpitations.  Heart rhythm is stable S1 and S2 are noted, no ectopics.  Pulmonary:      Effort: Pulmonary effort is normal.      Breath sounds: Normal breath sounds.      Comments: Patient denies any coughing or wheezing.  Lungs are clear to auscultation.    Abdominal:      General: Bowel sounds are normal.      Palpations: Abdomen is soft.      Comments: Abdomen is soft and nontender, no hepatosplenomegaly.  No flank tenderness.  No suprapubic pain.  Positive bowel sounds x4.  No abdominal guarding and no rebound tenderness.   Genitourinary:     Comments: Patient is recovering from surgery for removal of a left breast cancer.  She did have a partial left breast mastectomy, and also had some lymph nodes removed from the left axillary region and there is a seroma that is still draining.  She is following with the surgeon and is on antibiotics at this time.  Musculoskeletal:         General: Normal range of motion.      Cervical back: Normal range of motion.      Comments: Age-related arthritis in the joints.  Mild restriction of motion cervical and lumbar spines due to muscle spasm.   Skin:     General: Skin is warm.      Findings: Erythema present.      Comments: Patient had a seroma   where  the  left axillary  nodes  were   removed.  Still draining.  Following with surgeon.  Which is slowly improving.  Patient is still taking antibiotics.   Neurological:      General: No focal deficit present.      Mental Status: She is alert and oriented to person, place, and time. Mental status is at baseline.      Comments: No focal neurosensory deficits are noted.  Patient denies any peripheral neuropathy.   Coordination and gait are stable.  Normal muscle strength upper and lower extremities.   Psychiatric:         Mood and Affect: Mood normal.         Behavior: Behavior normal.         Thought Content: Thought content normal.         Judgment: Judgment normal.      Comments: Patient has normal mood and affect.  Thought content and judgment are stable.  No signs of vascular dementia.  Behavior is normal.  Does have some mild anxiety concerning her diagnosis of breast cancer but she is very optimistic.     PLAN : Patient is a 54-year-old female who underwent surgery 3 weeks ago for a left breast cancer that was estrogen receptor positive.  She also developed a seroma under the left axillary region and is being treated and  managed  by the surgeon.  She is having a  consultation with the medical oncologist next week to determine the next level of treatment.  She probably will receive radiation in the upcoming weeks after the seroma heals.  Patient was in today for a follow-up visit and blood pressure and other vitals are all stable.  We had a long conversation since she is a little bit worried about the seroma  healing.  I did examine it and I think the antibiotics are healing the cellulitis but there also is some induration where the nodes were removed.  Patient is otherwise stable we will follow-up after her current treatment is completed.  Much of the conversation today was reassurance and hopefully things will improve and she will recover from this unfortunate event of breast cancer.    Problem List Items Addressed This Visit       Vitamin D deficiency     Other Visit Diagnoses       Primary hypertension        Dyslipidemia        Diabetes mellitus without complication (CMS/HCC)        Malaise and fatigue                     Tre Raymond,

## 2024-03-13 DIAGNOSIS — J30.89 NON-SEASONAL ALLERGIC RHINITIS, UNSPECIFIED TRIGGER: ICD-10-CM

## 2024-03-14 ENCOUNTER — APPOINTMENT (OUTPATIENT)
Dept: HEMATOLOGY/ONCOLOGY | Facility: CLINIC | Age: 54
End: 2024-03-14
Payer: COMMERCIAL

## 2024-03-14 RX ORDER — MONTELUKAST SODIUM 10 MG/1
10 TABLET ORAL DAILY
Qty: 90 TABLET | Refills: 3 | Status: SHIPPED | OUTPATIENT
Start: 2024-03-14

## 2024-03-15 RX ORDER — PREDNISONE 10 MG/1
TABLET ORAL
Qty: 10 TABLET | Refills: 0 | Status: SHIPPED | OUTPATIENT
Start: 2024-03-15 | End: 2024-05-02 | Stop reason: WASHOUT

## 2024-03-21 ENCOUNTER — SOCIAL WORK (OUTPATIENT)
Dept: CASE MANAGEMENT | Facility: HOSPITAL | Age: 54
End: 2024-03-21
Payer: COMMERCIAL

## 2024-03-21 ENCOUNTER — OFFICE VISIT (OUTPATIENT)
Dept: HEMATOLOGY/ONCOLOGY | Facility: CLINIC | Age: 54
End: 2024-03-21
Payer: COMMERCIAL

## 2024-03-21 VITALS
BODY MASS INDEX: 26.66 KG/M2 | DIASTOLIC BLOOD PRESSURE: 88 MMHG | SYSTOLIC BLOOD PRESSURE: 144 MMHG | WEIGHT: 180 LBS | HEART RATE: 83 BPM | HEIGHT: 69 IN

## 2024-03-21 DIAGNOSIS — Z17.0 MALIGNANT NEOPLASM OF UPPER-OUTER QUADRANT OF LEFT BREAST IN FEMALE, ESTROGEN RECEPTOR POSITIVE (MULTI): Primary | ICD-10-CM

## 2024-03-21 DIAGNOSIS — C50.412 MALIGNANT NEOPLASM OF UPPER-OUTER QUADRANT OF LEFT BREAST IN FEMALE, ESTROGEN RECEPTOR POSITIVE (MULTI): Primary | ICD-10-CM

## 2024-03-21 PROCEDURE — 1036F TOBACCO NON-USER: CPT | Performed by: STUDENT IN AN ORGANIZED HEALTH CARE EDUCATION/TRAINING PROGRAM

## 2024-03-21 PROCEDURE — 3079F DIAST BP 80-89 MM HG: CPT | Performed by: STUDENT IN AN ORGANIZED HEALTH CARE EDUCATION/TRAINING PROGRAM

## 2024-03-21 PROCEDURE — 99215 OFFICE O/P EST HI 40 MIN: CPT | Performed by: STUDENT IN AN ORGANIZED HEALTH CARE EDUCATION/TRAINING PROGRAM

## 2024-03-21 PROCEDURE — 99205 OFFICE O/P NEW HI 60 MIN: CPT | Performed by: STUDENT IN AN ORGANIZED HEALTH CARE EDUCATION/TRAINING PROGRAM

## 2024-03-21 PROCEDURE — 3077F SYST BP >= 140 MM HG: CPT | Performed by: STUDENT IN AN ORGANIZED HEALTH CARE EDUCATION/TRAINING PROGRAM

## 2024-03-21 RX ORDER — FAMOTIDINE 10 MG/ML
20 INJECTION INTRAVENOUS ONCE AS NEEDED
Status: CANCELLED | OUTPATIENT
Start: 2024-04-12

## 2024-03-21 RX ORDER — DIPHENHYDRAMINE HYDROCHLORIDE 50 MG/ML
50 INJECTION INTRAMUSCULAR; INTRAVENOUS AS NEEDED
Status: CANCELLED | OUTPATIENT
Start: 2024-04-12

## 2024-03-21 RX ORDER — DIPHENHYDRAMINE HCL 25 MG
25 TABLET ORAL ONCE
Status: CANCELLED | OUTPATIENT
Start: 2024-04-12

## 2024-03-21 RX ORDER — ONDANSETRON HYDROCHLORIDE 8 MG/1
8 TABLET, FILM COATED ORAL EVERY 8 HOURS PRN
Qty: 30 TABLET | Refills: 5 | Status: SHIPPED | OUTPATIENT
Start: 2024-03-21

## 2024-03-21 RX ORDER — PROCHLORPERAZINE MALEATE 10 MG
10 TABLET ORAL EVERY 6 HOURS PRN
Status: CANCELLED | OUTPATIENT
Start: 2024-04-12

## 2024-03-21 RX ORDER — EPINEPHRINE 0.3 MG/.3ML
0.3 INJECTION SUBCUTANEOUS EVERY 5 MIN PRN
Status: CANCELLED | OUTPATIENT
Start: 2024-04-12

## 2024-03-21 RX ORDER — PALONOSETRON 0.05 MG/ML
0.25 INJECTION, SOLUTION INTRAVENOUS ONCE
Status: CANCELLED | OUTPATIENT
Start: 2024-05-03

## 2024-03-21 RX ORDER — DIPHENHYDRAMINE HCL 25 MG
25 TABLET ORAL ONCE
Status: CANCELLED | OUTPATIENT
Start: 2024-05-03

## 2024-03-21 RX ORDER — DIPHENHYDRAMINE HYDROCHLORIDE 50 MG/ML
50 INJECTION INTRAMUSCULAR; INTRAVENOUS AS NEEDED
Status: CANCELLED | OUTPATIENT
Start: 2024-05-03

## 2024-03-21 RX ORDER — PALONOSETRON 0.05 MG/ML
0.25 INJECTION, SOLUTION INTRAVENOUS ONCE
Status: CANCELLED | OUTPATIENT
Start: 2024-04-12

## 2024-03-21 RX ORDER — EPINEPHRINE 0.3 MG/.3ML
0.3 INJECTION SUBCUTANEOUS EVERY 5 MIN PRN
Status: CANCELLED | OUTPATIENT
Start: 2024-05-03

## 2024-03-21 RX ORDER — FAMOTIDINE 10 MG/ML
20 INJECTION INTRAVENOUS ONCE AS NEEDED
Status: CANCELLED | OUTPATIENT
Start: 2024-05-03

## 2024-03-21 RX ORDER — PROCHLORPERAZINE EDISYLATE 5 MG/ML
10 INJECTION INTRAMUSCULAR; INTRAVENOUS EVERY 6 HOURS PRN
Status: CANCELLED | OUTPATIENT
Start: 2024-05-03

## 2024-03-21 RX ORDER — PROCHLORPERAZINE MALEATE 10 MG
10 TABLET ORAL EVERY 6 HOURS PRN
Status: CANCELLED | OUTPATIENT
Start: 2024-05-03

## 2024-03-21 RX ORDER — PROCHLORPERAZINE MALEATE 10 MG
10 TABLET ORAL EVERY 6 HOURS PRN
Qty: 30 TABLET | Refills: 5 | Status: SHIPPED | OUTPATIENT
Start: 2024-03-21

## 2024-03-21 RX ORDER — PROCHLORPERAZINE EDISYLATE 5 MG/ML
10 INJECTION INTRAMUSCULAR; INTRAVENOUS EVERY 6 HOURS PRN
Status: CANCELLED | OUTPATIENT
Start: 2024-04-12

## 2024-03-21 RX ORDER — ALBUTEROL SULFATE 0.83 MG/ML
3 SOLUTION RESPIRATORY (INHALATION) AS NEEDED
Status: CANCELLED | OUTPATIENT
Start: 2024-04-12

## 2024-03-21 RX ORDER — ALBUTEROL SULFATE 0.83 MG/ML
3 SOLUTION RESPIRATORY (INHALATION) AS NEEDED
Status: CANCELLED | OUTPATIENT
Start: 2024-05-03

## 2024-03-21 NOTE — PATIENT INSTRUCTIONS
Schedule chemo treatments.  Have labs drawn 1-2 days prior to 1st chemo treatment.  Follow up with Dr. Bhatia on 5/2/24.  Please call [phone] with questions.

## 2024-03-21 NOTE — PROGRESS NOTES
Referral received per team for Letter of financial support for [facility] [employer]. Letter has been written.    REGAN Santos

## 2024-03-22 NOTE — PROGRESS NOTES
Breast Medical Oncology Clinic  Location: [facility]    Visit Type: New Patient Visit    Oncologic History:    12/21/2023: Screening mammogram: New somewhat spiculated central lateral superior left breast mass and small nodule more anteriorly in the slight lateral left breast.    12/26/2023: Diagnostic breast imaging: Asymmetry in the medial right breast persists on additional imaging.  This is stable since August 2005.  In the left breast there is an irregular high density mass with associated architectural distortion.  An oval circumscribed equal density mass in the central lateral left breast at middle depth persists.  On ultrasound there is no suspicious finding to correspond to the right breast asymmetry.  In the left breast an irregular hypoechoic mass is seen at the 1 o'clock position 8 cm from the nipple measuring 1.8 x 1.2 x 2.0 cm.  At 4:00 3 cm from the nipple there is a cyst.  Targeted ultrasound of the left axilla demonstrates 1 abnormal appearing left axillary lymph node and 4 normal-appearing lymph nodes.    12/29/2023: Left breast mass ultrasound-guided core needle biopsy: Invasive ductal carcinoma, grade 3.  ER positive (greater than 95%) GA positive (10%) HER2 equal focal, not amplified by dual-stephanie.    12/29/2023: Left axillary lymph node biopsy shows metastatic carcinoma    1/29/2024 [facility] BRCA1/2 testing: Negative. Larger hereditary cancer panel also negative.    2/7/2024: Left breast Magseed partial mastectomy with sentinel lymph node biopsy: Pathology yields a single focus of invasive ductal carcinoma, grade 3 measuring 3 cm.  There is high grade DCIS present.  All margins are negative.  1 of 4 lymph nodes examined with macrometastasis.  PT2 pN1a.    3/12/2024: Oncotype DX recurrence score 34      Subjective: History of Present Illness    Patient presents today for [facility] for management of recently diagnosed breast cancer.     Oncologic history reviewed above.     She was in her usual state of health  "at the time of diagnosis.     She has recovered well from surgery.    Denies weight loss, changes in the breast and/or chest wall, new aches or pains, changes in appetite or energy level. No current concerns at this time.       Gynecologic History:     Age of first menses: 12 years old  Age of last menses: 52 years old  ; FLB at age 28  Post-menopausal  She did not breastfeed  Uterus/Ovaries: Intact  OCP: 20 years    Pertinent Family history:    Breast Cancer:  Maternal aunt, maternal great aunt  Ovarian Cancer: None  Pancreatic Cancer: None  Other:  Paternal grandmother with colon cancer; father with prostate cancer and skin cancer    Social History  Bisi Reynaga \"Tanya\"  reports that she has never smoked. She has never been exposed to tobacco smoke. She has never used smokeless tobacco.  She  reports current alcohol use.  She  reports no history of drug use.    ROS:     Review of Systems   All other systems reviewed and are negative.       Physical Examination:    /88 (BP Location: Right arm, Patient Position: Sitting, BP Cuff Size: Adult)   Pulse 83   Ht (S) 1.753 m (5' 9.02\")   Wt 81.6 kg (180 lb)   BMI 26.57 kg/m²     Physical Exam  Vitals and nursing note reviewed.   Constitutional:       General: She is not in acute distress.     Appearance: Normal appearance. She is not toxic-appearing.   HENT:      Head: Normocephalic and atraumatic.   Eyes:      Conjunctiva/sclera: Conjunctivae normal.   Cardiovascular:      Rate and Rhythm: Normal rate.   Pulmonary:      Effort: Pulmonary effort is normal. No respiratory distress.   Abdominal:      General: Abdomen is flat.      Palpations: Abdomen is soft.   Musculoskeletal:         General: No swelling. Normal range of motion.      Cervical back: Normal range of motion.   Skin:     General: Skin is warm and dry.   Neurological:      General: No focal deficit present.      Mental Status: She is alert.   Psychiatric:         Mood and Affect: Mood normal. "         Breast Examination:    Left breast: No masses, skin or nipple changes; well healed incisions  Right breast: No masses, skin or nipple changes  Axillary: No significant examination findings    ECOG Performance Status:     [x] 0 Fully active, able to carry on all pre-disease performance without restriction  [] 1 Restricted in physically strenuous activity but ambulatory and able to carry out work of a light or sedentary nature, e.g., light house work, office work  [] 2 Ambulatory and capable of all selfcare but unable to carry out any work activities; up and about more than 50% of waking hours  [] 3 Capable of only limited selfcare; confined to bed or chair more than 50% of waking hours  [] 4 Completely disabled; cannot carry on any selfcare; totally confined to bed or chair  [] 5 Dead     Results:    Labs:  Ordered today in anticipation of treatment    Imaging:  Reviewed above in Onc History    Pathology:  Reviewed above in Onc History    Assessment:     Pathologic prognostic stage IIA (pT2 pN1 cM0) infiltrating ductal carcinoma of the left breast; Dx in 12/2023; Grade 3; ER positive (>95%), NV positive (10%), HER2 negative (2+, neg GIOVANI); Oncotype DX 34 ; S/p L PM and SLNBx    Bisi Reynaga is a very pleasant 54 y.o. postmenopausal female with newly diagnosed breast cancer. We reviewed the events that led to her diagnosis and subsequent procedures that have taken place. We discussed the features of her cancer that determine the approach to treatment including the size, grade, presence/absence of lymphovascular invasion, lymph node status and hormone receptor status (including Her2-samuel). Given these findings, we had the following discussion:      Plan:    Surgical Plan: S/p L PM with SNLBx  Additional biopsy: No further biopsy indicated  Radiation Plan: Referral in Place  Additional staging scans/DEXA/echo: Staging scans not indicated based on current stage, patient history and physical  examination.  Additional Path info (i.e Ki67, PDL1): Not indicated  Gene assays: Oncotype DX 34    Systemic treatment plan: Both high clinic risk features and high Oncotype RS. For this reason I have recommended chemotherapy. I recommended the adjuvant regimen of docetaxel and cyclophosphamide. She would receive the two drugs intravenously on the same day, once every 3 weeks for a total of 4 times. I reviewed the potential side effects and toxicities with the patient in detail. These include, but are not limited to, alopecia, nausea, vomiting, myelosuppression, with the attendant risks of infection and death, renal, and or hepatic insufficiency, fatigue, secondary malignancies such as leukemia, peripheral neuropathy, and infusion reactions including anaphylactic shock. The patient was provided with information sheets on the chemotherapeutic agents discussed for her review. Consent was signed today.    Intent: Curative   Clinical trial: Not eligible for our current trials   Endocrine therapy:  Will discuss at future visit   HER2 treatment: not indicated   Targeted agents:  Will discuss at future visit   Chemotherapy: As discussed above   BMA: Will discuss at future visit.    Access: Not indicated  Supportive meds: Anti-emetics sent to pharmacy  Genetic testing: Completed; negative  Fertility preservation: Not indicated  Other active problems/orders:     We discussed the use cold caps to minimize hair loss and/or decrease to time hair regrowth after chemotherapy. She met with our nursing team to obtain more information. She ultimately elected to pursue this. Will be arranged.    Surveillance plan: Continue yearly mammogram    Follow-up:  Prior to C2 TC    Patient expressed understanding of the plan outlined above. She had ample time to ask questions. She understands she can contact us should she have additional questions or issues arise in the interim.

## 2024-03-26 ENCOUNTER — HOSPITAL ENCOUNTER (OUTPATIENT)
Dept: RADIATION ONCOLOGY | Facility: CLINIC | Age: 54
Setting detail: RADIATION/ONCOLOGY SERIES
Discharge: HOME | End: 2024-03-26
Payer: COMMERCIAL

## 2024-03-26 VITALS
WEIGHT: 180.56 LBS | TEMPERATURE: 97.8 F | HEIGHT: 69 IN | SYSTOLIC BLOOD PRESSURE: 148 MMHG | RESPIRATION RATE: 18 BRPM | HEART RATE: 84 BPM | DIASTOLIC BLOOD PRESSURE: 88 MMHG | BODY MASS INDEX: 26.74 KG/M2 | OXYGEN SATURATION: 98 %

## 2024-03-26 DIAGNOSIS — C50.412 MALIGNANT NEOPLASM OF UPPER-OUTER QUADRANT OF LEFT BREAST IN FEMALE, ESTROGEN RECEPTOR POSITIVE (MULTI): Primary | ICD-10-CM

## 2024-03-26 DIAGNOSIS — Z17.0 MALIGNANT NEOPLASM OF UPPER-OUTER QUADRANT OF LEFT BREAST IN FEMALE, ESTROGEN RECEPTOR POSITIVE (MULTI): Primary | ICD-10-CM

## 2024-03-26 DIAGNOSIS — Z90.12 STATUS POST PARTIAL MASTECTOMY OF LEFT BREAST: ICD-10-CM

## 2024-03-26 PROCEDURE — 99215 OFFICE O/P EST HI 40 MIN: CPT | Performed by: RADIOLOGY

## 2024-03-26 PROCEDURE — 99205 OFFICE O/P NEW HI 60 MIN: CPT | Performed by: RADIOLOGY

## 2024-03-26 ASSESSMENT — ENCOUNTER SYMPTOMS
PSYCHIATRIC NEGATIVE: 1
HOT FLASHES: 1
EYES NEGATIVE: 1
CONSTITUTIONAL NEGATIVE: 1
GASTROINTESTINAL NEGATIVE: 1
MUSCULOSKELETAL NEGATIVE: 1
CARDIOVASCULAR NEGATIVE: 1
HEMATOLOGIC/LYMPHATIC NEGATIVE: 1
RESPIRATORY NEGATIVE: 1

## 2024-03-26 ASSESSMENT — LIFESTYLE VARIABLES
HOW OFTEN DO YOU HAVE SIX OR MORE DRINKS ON ONE OCCASION: NEVER
AUDIT-C TOTAL SCORE: 1
SKIP TO QUESTIONS 9-10: 1
HOW OFTEN DO YOU HAVE A DRINK CONTAINING ALCOHOL: MONTHLY OR LESS
HOW MANY STANDARD DRINKS CONTAINING ALCOHOL DO YOU HAVE ON A TYPICAL DAY: 1 OR 2

## 2024-03-26 ASSESSMENT — PATIENT HEALTH QUESTIONNAIRE - PHQ9
2. FEELING DOWN, DEPRESSED OR HOPELESS: NOT AT ALL
SUM OF ALL RESPONSES TO PHQ9 QUESTIONS 1 AND 2: 0
1. LITTLE INTEREST OR PLEASURE IN DOING THINGS: NOT AT ALL

## 2024-03-26 ASSESSMENT — PAIN SCALES - GENERAL: PAINLEVEL: 0-NO PAIN

## 2024-03-26 NOTE — PROGRESS NOTES
Radiation Oncology Outpatient Consult    Patient Name:  Bisi Reynaga  MRN:  [id number]  :  1970    Referring Provider: Raegan Herrera MD  Primary Care Provider: Tre Raymond DO  Care Team: Patient Care Team:  Tre Raymond DO as PCP - General Silvia Bhatia MD as Consulting Physician (Hematology and Oncology)    Date of Service: 3/26/2024     SUBJECTIVE  History of Present Illness:  Tanya Reynaga is a 54 y.o. female who was referred by Raegan Herrera MD, for a consultation to the [facility] Department of Radiation Oncology.  She is presenting for evaluation and management of her node positive left breast cancer.    Ms. Reynaga is a 54-year-old female who underwent a mammogram on 2023 which revealed a new spiculated mass in the upper outer quadrant of the left breast with a small nodule anterior to the mass as well as an area of nodular asymmetry in the medial right breast.  She underwent bilateral diagnostic mammograms on 2023.  The asymmetry in the right breast persisted with compression however it was found to be stable since .  In the left breast was an irregular high density mass in the upper outer quadrant as well as an oval circumscribed equal density mass in the lateral left breast which persisted with compression.  Right breast ultrasound was negative.  Left breast ultrasound directed at the 1 o'clock position, 8 cm from the nipple revealed a 1.9 x 1.2 x 2 cm hypoechoic mass.  At the 4 o'clock position, 3 cm from the nipple was a benign cyst.  Axillary ultrasound revealed 1 lymph node with cortical thickening.  On 2023 she underwent a left breast core biopsy which revealed invasive ductal carcinoma, grade 3.  Estrogen receptors stained positive at greater than 95%.  Progesterone receptors stained positive at 10%.  HER2/samuel was 2+ IHC and 1.2 on dual-stephanie.  Lymph node biopsy was consistent with metastatic carcinoma.  On 2024 she  underwent a left partial mastectomy with sentinel lymph node biopsy.  Pathology revealed a 3 cm invasive ductal carcinoma, grade 3.  No angiolymphatic invasion was present.  Ductal carcinoma in situ of the solid subtype, nuclear grade 3 was present.  There was no element of extensive intraductal component.  The resection margins were negative.  4 sentinel lymph nodes were removed, 1 of which contained metastatic disease measuring 1 cm with less than 2 mm of extranodal extension.  Her tumor was sent for Oncotype DX testing and her recurrence score returned as 34.  She saw Dr. Bhatia who is recommending adjuvant chemotherapy.  The plan is for 4 cycles of TC.  I was asked to see Ms. Reynaga by Dr. Raegan Herrera for a discussion regarding the role of radiation in the management of this node positive breast cancer.    Prior Radiotherapy:  No  Radiation Treatments       No radiation treatments to show. (Treatments may have been administered in another system.)          Current Systemic Treatment:  Yes, describe: TC for 4 cycles     Presence of Pacemaker or ICD:  No    Past Medical History:    Past Medical History:   Diagnosis Date    Personal history of urinary calculi 09/25/2014    Personal history of renal calculi        Past Surgical History:    Past Surgical History:   Procedure Laterality Date    BI US GUIDED BREAST LOCALIZATION AND BIOPSY LEFT Left 12/29/2023    BI US GUIDED BREAST LOCALIZATION AND BIOPSY LEFT 12/29/2023 Adriana So MD [facility]        Family History:  Cancer-related family history includes Breast cancer (age of onset: 40) in her mother's sister and another family member.    Social History:    Social History     Tobacco Use    Smoking status: Never     Passive exposure: Never    Smokeless tobacco: Never   Vaping Use    Vaping Use: Never used   Substance Use Topics    Alcohol use: Yes    Drug use: Never     Gynecologic History: Menarche age 12.  G2, P2.  She delivered her first child at the age of  28.  She went through menopause at the age of 52.  She took oral contraceptive pills for 20 years.  She denies any history of hormone replacement therapy.    Allergies:    Allergies   Allergen Reactions    Hydromorphone Nausea Only and Other    Clarithromycin GI Upset    Pollen Extracts Other        Medications:    Current Outpatient Medications:     brompheniramine-pseudoeph-DM 2-30-10 mg/5 mL syrup, take 5 to 10 ml by mouth every 4 hours for 4 days., Disp: , Rfl:     cetirizine (ZyrTEC) 10 mg tablet, Take 1 tablet (10 mg) by mouth., Disp: , Rfl:     ibuprofen 600 mg tablet, Take by mouth every 6 hours., Disp: , Rfl:     ketorolac (Toradol) 10 mg tablet, Take 1 tablet (10 mg) by mouth every 6 hours if needed., Disp: , Rfl:     levonorg-eth estrad triphasic (Enpresse) 50-30 (6)/75-40 (5)/125-30(10) per tablet, Take 1 tablet by mouth once daily., Disp: , Rfl:     montelukast (Singulair) 10 mg tablet, TAKE ONE TABLET BY MOUTH EVERY DAY, Disp: 90 tablet, Rfl: 3    multivit-min/ferrous fumarate (MULTI VITAMIN ORAL), Take by mouth., Disp: , Rfl:     ondansetron (Zofran) 8 mg tablet, Take 1 tablet (8 mg) by mouth every 8 hours if needed for nausea or vomiting., Disp: 30 tablet, Rfl: 5    ondansetron ODT (Zofran-ODT) 4 mg disintegrating tablet, Take by mouth., Disp: , Rfl:     oxyCODONE-acetaminophen (Percocet) 5-325 mg tablet, Take 1 tablet by mouth every 4 hours if needed., Disp: , Rfl:     pantoprazole (ProtoNix) 40 mg EC tablet, TAKE ONE TABLET BY MOUTH EVERY DAY, Disp: 30 tablet, Rfl: 3    predniSONE (Deltasone) 10 mg tablet, 2  PO daily  X  3 days    then  one  daily  X  4  days, Disp: 10 tablet, Rfl: 0    prochlorperazine (Compazine) 10 mg tablet, Take 1 tablet (10 mg) by mouth every 6 hours if needed for nausea or vomiting., Disp: 30 tablet, Rfl: 5    tamsulosin (Flomax) 0.4 mg 24 hr capsule, Take 1 capsule (0.4 mg) by mouth once daily., Disp: , Rfl:     traMADol (Ultram) 50 mg tablet, Take 1 tablet (50 mg) by  "mouth every 6 hours if needed for severe pain (7 - 10)., Disp: 15 tablet, Rfl: 0      Review of Systems:  Review of Systems   Constitutional: Negative.    HENT:  Negative.     Eyes: Negative.    Respiratory: Negative.     Cardiovascular: Negative.    Gastrointestinal: Negative.    Endocrine: Positive for hot flashes.   Genitourinary: Negative.     Musculoskeletal: Negative.    Skin: Negative.    Hematological: Negative.    Psychiatric/Behavioral: Negative.       The patient's current pain level was assessed.  They report currently having a pain of 0 out of 10.  They feel their pain is under control without the use of pain medications.    Performance Status:  The Karnofsky performance scale today is 100, Fully active, able to carry on all pre-disease performed without restriction (ECOG equivalent 0).        OBJECTIVE  Physical Exam:  /88 (BP Location: Right arm, Patient Position: Sitting, BP Cuff Size: Adult)   Pulse 84   Temp 36.6 °C (97.8 °F) (Temporal)   Resp 18   Ht 1.753 m (5' 9.02\")   Wt 81.9 kg (180 lb 8.9 oz)   SpO2 98%   BMI 26.65 kg/m²    Physical Exam  Constitutional:       Appearance: Normal appearance.   HENT:      Head: Normocephalic and atraumatic.   Eyes:      Conjunctiva/sclera: Conjunctivae normal.   Cardiovascular:      Heart sounds: Normal heart sounds.   Pulmonary:      Breath sounds: Normal breath sounds.   Chest:          Comments: Examination of the left breast reveals a well-healing incision in the upper outer aspect with a mild seroma beneath.  There are no suspicious nodules, nipple retraction, or nipple discharge bilaterally.  Abdominal:      Palpations: Abdomen is soft.   Musculoskeletal:      Right lower leg: No edema.      Left lower leg: No edema.   Lymphadenopathy:      Upper Body:      Right upper body: No supraclavicular or axillary adenopathy.      Left upper body: No supraclavicular or axillary adenopathy.   Neurological:      Mental Status: She is alert.      "     Laboratory Review:  There are no laboratory contraindications to radiation therapy.      Pathology Review:  The pertinent pathology results were reviewed and discussed with the patient.  See history for details.    Imaging:  The pertinent imaging results were reviewed and discussed with the patient.  See history for details.       ASSESSMENT:  Bisi Reynaga is a 54 y.o. female with Malignant neoplasm of upper-outer quadrant of left breast in female, estrogen receptor positive (CMS/HCC), Clinical: Stage IIA (cT1c, cN1, cM0, G3, ER+, IA+, HER2-)  Malignant neoplasm of upper-outer quadrant of left breast in female, estrogen receptor positive (CMS/HCC), Pathologic: Stage IIA (pT2, pN1a(sn), cM0, G3, ER+, IA+, HER2-).  She is status post left partial mastectomy with sentinel lymph node biopsy.     PLAN: The plan is for adjuvant TC x 4.  I had a long conversation with the patient detailing the role of radiation in the management of this newly diagnosed breast cancer.  We discussed treating breast alone versus high tangents versus breast and comprehensive erica areas as well as the differences in side effects.  I estimated approximately a 5% erica recurrence without the addition of erica irradiation.  We also discussed the increased amount of heart and lung in the field as well as the increased risk of lymphedema with the addition of erica irradiation.  She prefers to proceed with treating the breast and low axilla.  The side effects of radiation discussed included but were not limited to skin irritation with possible breakdown, fatigue, possible poor cosmetic outcome, rib fragility, pulmonary toxicity, cardiac toxicity, lymphedema and the possibility of a secondary malignancy induced by the radiation.  I will see her back following chemotherapy for radiation planning.    NCCN Guidelines were applicable to guide this patients treatment plan.   Arjun Odom RN

## 2024-04-03 LAB
AP SUMMARY REPORT: NORMAL
SCAN RESULT: NORMAL

## 2024-04-08 ENCOUNTER — LAB (OUTPATIENT)
Dept: LAB | Facility: LAB | Age: 54
End: 2024-04-08
Payer: COMMERCIAL

## 2024-04-08 DIAGNOSIS — C50.412 MALIGNANT NEOPLASM OF UPPER-OUTER QUADRANT OF LEFT BREAST IN FEMALE, ESTROGEN RECEPTOR POSITIVE (MULTI): ICD-10-CM

## 2024-04-08 DIAGNOSIS — Z17.0 MALIGNANT NEOPLASM OF UPPER-OUTER QUADRANT OF LEFT BREAST IN FEMALE, ESTROGEN RECEPTOR POSITIVE (MULTI): ICD-10-CM

## 2024-04-08 LAB
ALBUMIN SERPL BCP-MCNC: 4.6 G/DL (ref 3.4–5)
ALP SERPL-CCNC: 75 U/L (ref 33–110)
ALT SERPL W P-5'-P-CCNC: 34 U/L (ref 7–45)
ANION GAP SERPL CALC-SCNC: 14 MMOL/L (ref 10–20)
AST SERPL W P-5'-P-CCNC: 19 U/L (ref 9–39)
B-HCG SERPL-ACNC: <3 MIU/ML
BASOPHILS # BLD AUTO: 0.04 X10*3/UL (ref 0–0.1)
BASOPHILS NFR BLD AUTO: 0.9 %
BILIRUB SERPL-MCNC: 0.6 MG/DL (ref 0–1.2)
BUN SERPL-MCNC: 15 MG/DL (ref 6–23)
CALCIUM SERPL-MCNC: 10 MG/DL (ref 8.6–10.6)
CHLORIDE SERPL-SCNC: 104 MMOL/L (ref 98–107)
CO2 SERPL-SCNC: 27 MMOL/L (ref 21–32)
CREAT SERPL-MCNC: 0.69 MG/DL (ref 0.5–1.05)
EGFRCR SERPLBLD CKD-EPI 2021: >90 ML/MIN/1.73M*2
EOSINOPHIL # BLD AUTO: 0.08 X10*3/UL (ref 0–0.7)
EOSINOPHIL NFR BLD AUTO: 1.7 %
ERYTHROCYTE [DISTWIDTH] IN BLOOD BY AUTOMATED COUNT: 13.8 % (ref 11.5–14.5)
GLUCOSE SERPL-MCNC: 79 MG/DL (ref 74–99)
HBV CORE AB SER QL: NONREACTIVE
HBV SURFACE AB SER-ACNC: <3.1 MIU/ML
HBV SURFACE AG SERPL QL IA: NONREACTIVE
HCT VFR BLD AUTO: 43.2 % (ref 36–46)
HGB BLD-MCNC: 14.2 G/DL (ref 12–16)
IMM GRANULOCYTES # BLD AUTO: 0 X10*3/UL (ref 0–0.7)
IMM GRANULOCYTES NFR BLD AUTO: 0 % (ref 0–0.9)
LYMPHOCYTES # BLD AUTO: 1.82 X10*3/UL (ref 1.2–4.8)
LYMPHOCYTES NFR BLD AUTO: 39.6 %
MCH RBC QN AUTO: 28.7 PG (ref 26–34)
MCHC RBC AUTO-ENTMCNC: 32.9 G/DL (ref 32–36)
MCV RBC AUTO: 87 FL (ref 80–100)
MONOCYTES # BLD AUTO: 0.35 X10*3/UL (ref 0.1–1)
MONOCYTES NFR BLD AUTO: 7.6 %
NEUTROPHILS # BLD AUTO: 2.31 X10*3/UL (ref 1.2–7.7)
NEUTROPHILS NFR BLD AUTO: 50.2 %
NRBC BLD-RTO: 0 /100 WBCS (ref 0–0)
PLATELET # BLD AUTO: 315 X10*3/UL (ref 150–450)
POTASSIUM SERPL-SCNC: 4 MMOL/L (ref 3.5–5.3)
PROT SERPL-MCNC: 7.2 G/DL (ref 6.4–8.2)
RBC # BLD AUTO: 4.95 X10*6/UL (ref 4–5.2)
SODIUM SERPL-SCNC: 141 MMOL/L (ref 136–145)
WBC # BLD AUTO: 4.6 X10*3/UL (ref 4.4–11.3)

## 2024-04-08 PROCEDURE — 80053 COMPREHEN METABOLIC PANEL: CPT

## 2024-04-08 PROCEDURE — 84702 CHORIONIC GONADOTROPIN TEST: CPT

## 2024-04-08 PROCEDURE — 86704 HEP B CORE ANTIBODY TOTAL: CPT

## 2024-04-08 PROCEDURE — 87340 HEPATITIS B SURFACE AG IA: CPT

## 2024-04-08 PROCEDURE — 36415 COLL VENOUS BLD VENIPUNCTURE: CPT

## 2024-04-08 PROCEDURE — 86706 HEP B SURFACE ANTIBODY: CPT

## 2024-04-08 PROCEDURE — 85025 COMPLETE CBC W/AUTO DIFF WBC: CPT

## 2024-04-11 RX ORDER — HEPARIN SODIUM,PORCINE/PF 10 UNIT/ML
50 SYRINGE (ML) INTRAVENOUS AS NEEDED
Status: CANCELLED | OUTPATIENT
Start: 2024-04-11

## 2024-04-11 RX ORDER — HEPARIN 100 UNIT/ML
500 SYRINGE INTRAVENOUS AS NEEDED
Status: CANCELLED | OUTPATIENT
Start: 2024-04-11

## 2024-04-12 ENCOUNTER — INFUSION (OUTPATIENT)
Dept: HEMATOLOGY/ONCOLOGY | Facility: CLINIC | Age: 54
End: 2024-04-12
Payer: COMMERCIAL

## 2024-04-12 ENCOUNTER — SOCIAL WORK (OUTPATIENT)
Dept: HEMATOLOGY/ONCOLOGY | Facility: CLINIC | Age: 54
End: 2024-04-12

## 2024-04-12 VITALS
TEMPERATURE: 97.2 F | HEART RATE: 86 BPM | WEIGHT: 175.04 LBS | RESPIRATION RATE: 16 BRPM | BODY MASS INDEX: 25.84 KG/M2 | OXYGEN SATURATION: 97 % | DIASTOLIC BLOOD PRESSURE: 95 MMHG | SYSTOLIC BLOOD PRESSURE: 154 MMHG

## 2024-04-12 DIAGNOSIS — Z17.0 MALIGNANT NEOPLASM OF UPPER-OUTER QUADRANT OF LEFT BREAST IN FEMALE, ESTROGEN RECEPTOR POSITIVE (MULTI): ICD-10-CM

## 2024-04-12 DIAGNOSIS — C50.412 MALIGNANT NEOPLASM OF UPPER-OUTER QUADRANT OF LEFT BREAST IN FEMALE, ESTROGEN RECEPTOR POSITIVE (MULTI): ICD-10-CM

## 2024-04-12 PROCEDURE — 96372 THER/PROPH/DIAG INJ SC/IM: CPT | Performed by: STUDENT IN AN ORGANIZED HEALTH CARE EDUCATION/TRAINING PROGRAM

## 2024-04-12 PROCEDURE — 96375 TX/PRO/DX INJ NEW DRUG ADDON: CPT | Mod: INF

## 2024-04-12 PROCEDURE — 96377 APPLICATON ON-BODY INJECTOR: CPT

## 2024-04-12 PROCEDURE — 2500000004 HC RX 250 GENERAL PHARMACY W/ HCPCS (ALT 636 FOR OP/ED): Performed by: STUDENT IN AN ORGANIZED HEALTH CARE EDUCATION/TRAINING PROGRAM

## 2024-04-12 PROCEDURE — 2500000001 HC RX 250 WO HCPCS SELF ADMINISTERED DRUGS (ALT 637 FOR MEDICARE OP): Performed by: STUDENT IN AN ORGANIZED HEALTH CARE EDUCATION/TRAINING PROGRAM

## 2024-04-12 PROCEDURE — 96417 CHEMO IV INFUS EACH ADDL SEQ: CPT

## 2024-04-12 PROCEDURE — 96374 THER/PROPH/DIAG INJ IV PUSH: CPT | Mod: INF

## 2024-04-12 PROCEDURE — 96361 HYDRATE IV INFUSION ADD-ON: CPT | Mod: INF

## 2024-04-12 PROCEDURE — 96413 CHEMO IV INFUSION 1 HR: CPT

## 2024-04-12 PROCEDURE — 96360 HYDRATION IV INFUSION INIT: CPT | Mod: INF

## 2024-04-12 RX ORDER — FAMOTIDINE 10 MG/ML
20 INJECTION INTRAVENOUS ONCE AS NEEDED
Status: DISCONTINUED | OUTPATIENT
Start: 2024-04-12 | End: 2024-04-12 | Stop reason: HOSPADM

## 2024-04-12 RX ORDER — DIPHENHYDRAMINE HYDROCHLORIDE 50 MG/ML
50 INJECTION INTRAMUSCULAR; INTRAVENOUS AS NEEDED
Status: DISCONTINUED | OUTPATIENT
Start: 2024-04-12 | End: 2024-04-12 | Stop reason: HOSPADM

## 2024-04-12 RX ORDER — DIPHENHYDRAMINE HCL 25 MG
25 CAPSULE ORAL ONCE
Status: COMPLETED | OUTPATIENT
Start: 2024-04-12 | End: 2024-04-12

## 2024-04-12 RX ORDER — EPINEPHRINE 0.3 MG/.3ML
0.3 INJECTION SUBCUTANEOUS EVERY 5 MIN PRN
Status: DISCONTINUED | OUTPATIENT
Start: 2024-04-12 | End: 2024-04-12 | Stop reason: HOSPADM

## 2024-04-12 RX ORDER — ALBUTEROL SULFATE 0.83 MG/ML
3 SOLUTION RESPIRATORY (INHALATION) AS NEEDED
Status: DISCONTINUED | OUTPATIENT
Start: 2024-04-12 | End: 2024-04-12 | Stop reason: HOSPADM

## 2024-04-12 RX ORDER — PALONOSETRON 0.05 MG/ML
0.25 INJECTION, SOLUTION INTRAVENOUS ONCE
Status: COMPLETED | OUTPATIENT
Start: 2024-04-12 | End: 2024-04-12

## 2024-04-12 RX ADMIN — PEGFILGRASTIM 6 MG: KIT SUBCUTANEOUS at 11:48

## 2024-04-12 RX ADMIN — DEXAMETHASONE SODIUM PHOSPHATE 12 MG: 10 INJECTION, SOLUTION INTRAMUSCULAR; INTRAVENOUS at 09:38

## 2024-04-12 RX ADMIN — CYCLOPHOSPHAMIDE 1194 MG: 1 INJECTION, POWDER, FOR SOLUTION INTRAVENOUS; ORAL at 11:45

## 2024-04-12 RX ADMIN — DIPHENHYDRAMINE HYDROCHLORIDE 25 MG: 25 CAPSULE ORAL at 09:38

## 2024-04-12 RX ADMIN — SODIUM CHLORIDE 500 ML: 9 INJECTION, SOLUTION INTRAVENOUS at 09:42

## 2024-04-12 RX ADMIN — PALONOSETRON 250 MCG: 0.05 INJECTION, SOLUTION INTRAVENOUS at 09:38

## 2024-04-12 RX ADMIN — DEXTROSE MONOHYDRATE 149.25 MG: 50 INJECTION, SOLUTION INTRAVENOUS at 10:20

## 2024-04-12 ASSESSMENT — PAIN SCALES - GENERAL: PAINLEVEL: 0-NO PAIN

## 2024-04-12 NOTE — SIGNIFICANT EVENT
04/12/24 0855   Prechemo Checklist   Has the patient been in the hospital, ED, or urgent care since last date of service No   Chemo/Immuno Consent Signed Yes   Protocol/Indications Verified Yes   Confirmed to previous date/time of medication Yes   Compared to previous dose Yes   All medications are dated accurately Yes   Pregnancy Test Negative Yes   Parameters Met Yes   BSA/Weight-Height Verified Yes   Dose Calculations Verified (current, total, cumulative) Yes

## 2024-04-12 NOTE — PROGRESS NOTES
ONCOLOGY/HEMATOLOGY PSYCHOSOCIAL ASSESSMENT     Demographic Information  Bisi Reynaga  1970  [id number]  Assessment Type:    Date of assessment: 04/12/24  Person(s) present during assessment: Pt and   Did patient participate in assessment: yes  If no, why not:  Primary language: English  Interpretive services used: no  Medical Oncologist: Jannette  Diagnosis: Breast ER+ IN+ 1/4 nodes + s/p lumpectomy                Marital Status / Family / Household  Status:    Family composition: lives at home with , 22 y/o son and son's dtr who is 2 1/2. Pt has a 26 y/o son who resides in [place].  Family health history: maternal side breast cancer  Support systems: , family and friends  Primary caregiver:  self  Current employment status:  employed  Work history/type of work:    for [employer]  Comments:     Environmental Supports  Current living situation:   house  Patient's home environment: multi level and managing steps fine     Functional Status   Functional status:  Independent   Other health issues that the patient is experiencing: none  What supports are in place to assist the patient: has help at home from  and son  What community resources have been offered: TGP programming and Wellbeing retreat massage program  Transportation:  Pt or family to drive her  Psychosocial risk factors impacting the patient:   Adjustment to dx and treatment     Assistive Devices  Patient has the following equipment: none  Patient currently ambulates:  independently     Finances/Insurance  Primary insurance: [employer] under   Patient's current income source:  job at [employer]  Does the patient have any financial concerns:  not at this time    Comments:      Advance Directives  None on file  Mental Health  Mental health history and status details:  none reported or on file  How does the patient describe their current health status: Pt is AxOx3 and clear of dx and treatment plan  What  does the patient do for enjoyment: Pt likes to read, go out with friends and play with her grd dtr.    Depression Screen        Social Determinants of Health     Tobacco Use: Low Risk  (4/12/2024)    Patient History     Smoking Tobacco Use: Never     Smokeless Tobacco Use: Never     Passive Exposure: Never   Alcohol Use: Not At Risk (3/26/2024)    AUDIT-C     Frequency of Alcohol Consumption: Monthly or less     Average Number of Drinks: 1 or 2     Frequency of Binge Drinking: Never   Financial Resource Strain: Not on file   Food Insecurity: Not on file   Transportation Needs: Not on file   Physical Activity: Not on file   Stress: Stress Concern Present (3/26/2024)    [employer] - Occupational Stress Questionnaire     Feeling of Stress : To some extent   Social Connections: Not on file   Intimate Partner Violence: Not on file   Depression: Not at risk (4/12/2024)    PHQ-2     PHQ-2 Score: 0   Housing Stability: Not on file   Utilities: Not on file   Digital Equity: Not on file   Health Literacy: Not on file       Assessment/Plan  Pt is a 53 y/o female dx with left breast cancer s/p lumpectomy and 1/4 nodes+. Pt is ER+ and IA +. Pt starting the first of four docetaxel and cyclophosphomide. Pt will be using the paxman cooling cap. Paxman donal was started by REG[facility] at [facility]. Pt lives at home with her , 22 y/o son and his dtr who is 2 1/2. Pt has another son who is 25 yrs old and he lives in [place]. She indicates she talks to him often. Pt lives in [facility] home with steps. She has been independent with function and self care. Pt works four days a week and has off on Thursdays. 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 Pt mentions her  works in construction and is away most of the day. Her son can help her as well as her mom who lives very close if needed. Pt feels well supported through this. Pt spends her time reading, going out with friends and playing with her 2  1/1 y/o grd dtr. Pt is tolerating the cooling cap so far but does mention it is not a joke with it on and she is cold. Introduced Onc LISW-S supportive role. Reviewed resources and assistance available. Reviewed wellbeing retreat massage program and TGP programming and activities as well. Let pt know Onc GERONIMOW-S would be checking in with her to ensure she is coping and managing well. Gave her card to call Onc GERONIMOW-S for any needs.

## 2024-04-22 ENCOUNTER — NURSE TRIAGE (OUTPATIENT)
Dept: HEMATOLOGY/ONCOLOGY | Facility: HOSPITAL | Age: 54
End: 2024-04-22
Payer: COMMERCIAL

## 2024-05-02 ENCOUNTER — LAB (OUTPATIENT)
Dept: LAB | Facility: LAB | Age: 54
End: 2024-05-02
Payer: COMMERCIAL

## 2024-05-02 ENCOUNTER — OFFICE VISIT (OUTPATIENT)
Dept: HEMATOLOGY/ONCOLOGY | Facility: CLINIC | Age: 54
End: 2024-05-02
Payer: COMMERCIAL

## 2024-05-02 VITALS
DIASTOLIC BLOOD PRESSURE: 88 MMHG | TEMPERATURE: 98.1 F | SYSTOLIC BLOOD PRESSURE: 137 MMHG | RESPIRATION RATE: 18 BRPM | OXYGEN SATURATION: 98 % | WEIGHT: 174.82 LBS | HEART RATE: 89 BPM | BODY MASS INDEX: 25.81 KG/M2

## 2024-05-02 DIAGNOSIS — Z17.0 MALIGNANT NEOPLASM OF UPPER-OUTER QUADRANT OF LEFT BREAST IN FEMALE, ESTROGEN RECEPTOR POSITIVE (MULTI): ICD-10-CM

## 2024-05-02 DIAGNOSIS — C50.412 MALIGNANT NEOPLASM OF UPPER-OUTER QUADRANT OF LEFT BREAST IN FEMALE, ESTROGEN RECEPTOR POSITIVE (MULTI): ICD-10-CM

## 2024-05-02 DIAGNOSIS — C50.412 MALIGNANT NEOPLASM OF UPPER-OUTER QUADRANT OF LEFT BREAST IN FEMALE, ESTROGEN RECEPTOR POSITIVE (MULTI): Primary | ICD-10-CM

## 2024-05-02 DIAGNOSIS — Z17.0 MALIGNANT NEOPLASM OF UPPER-OUTER QUADRANT OF LEFT BREAST IN FEMALE, ESTROGEN RECEPTOR POSITIVE (MULTI): Primary | ICD-10-CM

## 2024-05-02 LAB
ALBUMIN SERPL BCP-MCNC: 4.5 G/DL (ref 3.4–5)
ALP SERPL-CCNC: 88 U/L (ref 33–110)
ALT SERPL W P-5'-P-CCNC: 30 U/L (ref 7–45)
ANION GAP SERPL CALC-SCNC: 11 MMOL/L (ref 10–20)
AST SERPL W P-5'-P-CCNC: 17 U/L (ref 9–39)
B-HCG SERPL-ACNC: 2 MIU/ML
BASOPHILS # BLD AUTO: 0.06 X10*3/UL (ref 0–0.1)
BASOPHILS NFR BLD AUTO: 1 %
BILIRUB SERPL-MCNC: 0.7 MG/DL (ref 0–1.2)
BUN SERPL-MCNC: 11 MG/DL (ref 6–23)
CALCIUM SERPL-MCNC: 9.9 MG/DL (ref 8.6–10.3)
CHLORIDE SERPL-SCNC: 105 MMOL/L (ref 98–107)
CO2 SERPL-SCNC: 29 MMOL/L (ref 21–32)
CREAT SERPL-MCNC: 0.65 MG/DL (ref 0.5–1.05)
EGFRCR SERPLBLD CKD-EPI 2021: >90 ML/MIN/1.73M*2
EOSINOPHIL # BLD AUTO: 0.01 X10*3/UL (ref 0–0.7)
EOSINOPHIL NFR BLD AUTO: 0.2 %
ERYTHROCYTE [DISTWIDTH] IN BLOOD BY AUTOMATED COUNT: 14.6 % (ref 11.5–14.5)
GLUCOSE SERPL-MCNC: 88 MG/DL (ref 74–99)
HCT VFR BLD AUTO: 41.5 % (ref 36–46)
HGB BLD-MCNC: 13.8 G/DL (ref 12–16)
IMM GRANULOCYTES # BLD AUTO: 0.01 X10*3/UL (ref 0–0.7)
IMM GRANULOCYTES NFR BLD AUTO: 0.2 % (ref 0–0.9)
LYMPHOCYTES # BLD AUTO: 1.36 X10*3/UL (ref 1.2–4.8)
LYMPHOCYTES NFR BLD AUTO: 23.7 %
MCH RBC QN AUTO: 28.7 PG (ref 26–34)
MCHC RBC AUTO-ENTMCNC: 33.3 G/DL (ref 32–36)
MCV RBC AUTO: 86 FL (ref 80–100)
MONOCYTES # BLD AUTO: 0.55 X10*3/UL (ref 0.1–1)
MONOCYTES NFR BLD AUTO: 9.6 %
NEUTROPHILS # BLD AUTO: 3.76 X10*3/UL (ref 1.2–7.7)
NEUTROPHILS NFR BLD AUTO: 65.3 %
NRBC BLD-RTO: 0 /100 WBCS (ref 0–0)
PLATELET # BLD AUTO: 439 X10*3/UL (ref 150–450)
POTASSIUM SERPL-SCNC: 4.9 MMOL/L (ref 3.5–5.3)
PROT SERPL-MCNC: 6.9 G/DL (ref 6.4–8.2)
RBC # BLD AUTO: 4.81 X10*6/UL (ref 4–5.2)
SODIUM SERPL-SCNC: 140 MMOL/L (ref 136–145)
WBC # BLD AUTO: 5.8 X10*3/UL (ref 4.4–11.3)

## 2024-05-02 PROCEDURE — 80053 COMPREHEN METABOLIC PANEL: CPT

## 2024-05-02 PROCEDURE — 84702 CHORIONIC GONADOTROPIN TEST: CPT

## 2024-05-02 PROCEDURE — 99215 OFFICE O/P EST HI 40 MIN: CPT | Performed by: STUDENT IN AN ORGANIZED HEALTH CARE EDUCATION/TRAINING PROGRAM

## 2024-05-02 PROCEDURE — 36415 COLL VENOUS BLD VENIPUNCTURE: CPT

## 2024-05-02 PROCEDURE — 3075F SYST BP GE 130 - 139MM HG: CPT | Performed by: STUDENT IN AN ORGANIZED HEALTH CARE EDUCATION/TRAINING PROGRAM

## 2024-05-02 PROCEDURE — 3079F DIAST BP 80-89 MM HG: CPT | Performed by: STUDENT IN AN ORGANIZED HEALTH CARE EDUCATION/TRAINING PROGRAM

## 2024-05-02 PROCEDURE — 85025 COMPLETE CBC W/AUTO DIFF WBC: CPT

## 2024-05-02 RX ORDER — DIPHENHYDRAMINE HYDROCHLORIDE 50 MG/ML
50 INJECTION INTRAMUSCULAR; INTRAVENOUS AS NEEDED
OUTPATIENT
Start: 2024-06-14

## 2024-05-02 RX ORDER — DIPHENHYDRAMINE HYDROCHLORIDE 50 MG/ML
50 INJECTION INTRAMUSCULAR; INTRAVENOUS AS NEEDED
Status: CANCELLED | OUTPATIENT
Start: 2024-05-24

## 2024-05-02 RX ORDER — PROCHLORPERAZINE MALEATE 10 MG
10 TABLET ORAL EVERY 6 HOURS PRN
OUTPATIENT
Start: 2024-06-14

## 2024-05-02 RX ORDER — PALONOSETRON 0.05 MG/ML
0.25 INJECTION, SOLUTION INTRAVENOUS ONCE
Status: CANCELLED | OUTPATIENT
Start: 2024-05-24

## 2024-05-02 RX ORDER — ALBUTEROL SULFATE 0.83 MG/ML
3 SOLUTION RESPIRATORY (INHALATION) AS NEEDED
Status: CANCELLED | OUTPATIENT
Start: 2024-05-24

## 2024-05-02 RX ORDER — EPINEPHRINE 0.3 MG/.3ML
0.3 INJECTION SUBCUTANEOUS EVERY 5 MIN PRN
Status: CANCELLED | OUTPATIENT
Start: 2024-05-24

## 2024-05-02 RX ORDER — PALONOSETRON 0.05 MG/ML
0.25 INJECTION, SOLUTION INTRAVENOUS ONCE
OUTPATIENT
Start: 2024-06-14

## 2024-05-02 RX ORDER — PROCHLORPERAZINE EDISYLATE 5 MG/ML
10 INJECTION INTRAMUSCULAR; INTRAVENOUS EVERY 6 HOURS PRN
Status: CANCELLED | OUTPATIENT
Start: 2024-05-24

## 2024-05-02 RX ORDER — ALBUTEROL SULFATE 0.83 MG/ML
3 SOLUTION RESPIRATORY (INHALATION) AS NEEDED
OUTPATIENT
Start: 2024-06-14

## 2024-05-02 RX ORDER — FAMOTIDINE 10 MG/ML
20 INJECTION INTRAVENOUS ONCE AS NEEDED
Status: CANCELLED | OUTPATIENT
Start: 2024-05-24

## 2024-05-02 RX ORDER — FAMOTIDINE 10 MG/ML
20 INJECTION INTRAVENOUS ONCE AS NEEDED
OUTPATIENT
Start: 2024-06-14

## 2024-05-02 RX ORDER — EPINEPHRINE 0.3 MG/.3ML
0.3 INJECTION SUBCUTANEOUS EVERY 5 MIN PRN
OUTPATIENT
Start: 2024-06-14

## 2024-05-02 RX ORDER — DIPHENHYDRAMINE HCL 25 MG
25 TABLET ORAL ONCE
Status: CANCELLED | OUTPATIENT
Start: 2024-05-24

## 2024-05-02 RX ORDER — DIPHENHYDRAMINE HCL 25 MG
25 TABLET ORAL ONCE
OUTPATIENT
Start: 2024-06-14

## 2024-05-02 RX ORDER — PROCHLORPERAZINE MALEATE 10 MG
10 TABLET ORAL EVERY 6 HOURS PRN
Status: CANCELLED | OUTPATIENT
Start: 2024-05-24

## 2024-05-02 RX ORDER — PROCHLORPERAZINE EDISYLATE 5 MG/ML
10 INJECTION INTRAMUSCULAR; INTRAVENOUS EVERY 6 HOURS PRN
OUTPATIENT
Start: 2024-06-14

## 2024-05-02 ASSESSMENT — PAIN SCALES - GENERAL: PAINLEVEL: 0-NO PAIN

## 2024-05-02 NOTE — PROGRESS NOTES
Breast Medical Oncology Clinic  Location: [facility]     Visit Type: Follow Up Patient Visit     Oncologic History:     12/21/2023: Screening mammogram: New somewhat spiculated central lateral superior left breast mass and small nodule more anteriorly in the slight lateral left breast.     12/26/2023: Diagnostic breast imaging: Asymmetry in the medial right breast persists on additional imaging.  This is stable since August 2005.  In the left breast there is an irregular high density mass with associated architectural distortion.  An oval circumscribed equal density mass in the central lateral left breast at middle depth persists.  On ultrasound there is no suspicious finding to correspond to the right breast asymmetry.  In the left breast an irregular hypoechoic mass is seen at the 1 o'clock position 8 cm from the nipple measuring 1.8 x 1.2 x 2.0 cm.  At 4:00 3 cm from the nipple there is a cyst.  Targeted ultrasound of the left axilla demonstrates 1 abnormal appearing left axillary lymph node and 4 normal-appearing lymph nodes.     12/29/2023: Left breast mass ultrasound-guided core needle biopsy: Invasive ductal carcinoma, grade 3.  ER positive (greater than 95%) NE positive (10%) HER2 equal focal, not amplified by dual-stephanie.     12/29/2023: Left axillary lymph node biopsy shows metastatic carcinoma     1/29/2024 [facility] BRCA1/2 testing: Negative. Larger hereditary cancer panel also negative.     2/7/2024: Left breast Magseed partial mastectomy with sentinel lymph node biopsy: Pathology yields a single focus of invasive ductal carcinoma, grade 3 measuring 3 cm.  There is high grade DCIS present.  All margins are negative.  1 of 4 lymph nodes examined with macrometastasis.  PT2 pN1a.     3/12/2024: Oncotype DX recurrence score 34     4/12/24: C1D1 adjuvant docetaxel/cyclophosphamide      Subjective: History of Present Illness     Patient presents prior to C2 of adjuvant TC. She reports chemo is overall going well. The  "worst part is wearing the cold cap during treatment. She has noticed some hair shedding in the last week. She does also have mild fatigue in the first few days after chemotherapy. She had some abdominal discomfort that resolved with eating frequent snacks. No nausea, diarrhea, or constipation. She has heart burn, at her baseline. She denies any neuropathy. She reports a cold since her last visit with us that has completely resolved. Did not have any fevers or chills.       Gynecologic History:      Age of first menses: 12 years old  Age of last menses: 52 years old  ; FLB at age 28  Post-menopausal  She did not breastfeed  Uterus/Ovaries: Intact  OCP: 20 years     Pertinent Family history:     Breast Cancer:  Maternal aunt, maternal great aunt  Ovarian Cancer: None  Pancreatic Cancer: None  Other:  Paternal grandmother with colon cancer; father with prostate cancer and skin cancer     Social History  Bisi Reynaga \"Tanya\"  reports that she has never smoked. She has never been exposed to tobacco smoke. She has never used smokeless tobacco.  She  reports current alcohol use.  She  reports no history of drug use.     ROS:      Review of Systems   All other systems reviewed and are negative.        Physical Examination:     Vitals:    24 0912   BP: 137/88   Pulse:    Resp:    Temp:    SpO2:      Vitals:    24 0902   Weight: 79.3 kg (174 lb 13.2 oz)         Physical Exam  Vitals and nursing note reviewed.   Constitutional:       General: She is not in acute distress.     Appearance: Normal appearance. She is not toxic-appearing.   HENT:      Head: Normocephalic and atraumatic.   Eyes:      Conjunctiva/sclera: Conjunctivae normal.   Cardiovascular:      Rate and Rhythm: Normal rate.   Pulmonary:      Effort: Pulmonary effort is normal. No respiratory distress.   Abdominal:      General: Abdomen is flat.      Palpations: Abdomen is soft.   Musculoskeletal:         General: No swelling. Normal range of " motion.      Cervical back: Normal range of motion.   Skin:     General: Skin is warm and dry.   Neurological:      General: No focal deficit present.      Mental Status: She is alert.   Psychiatric:         Mood and Affect: Mood normal.            Breast Examination: deferred at today's visit.      Baseline:   Left breast: No masses, skin or nipple changes; well healed incisions  Right breast: No masses, skin or nipple changes  Axillary: No significant examination findings     ECOG Performance Status:      [x] 0 Fully active, able to carry on all pre-disease performance without restriction  [] 1 Restricted in physically strenuous activity but ambulatory and able to carry out work of a light or sedentary nature, e.g., light house work, office work  [] 2 Ambulatory and capable of all selfcare but unable to carry out any work activities; up and about more than 50% of waking hours  [] 3 Capable of only limited selfcare; confined to bed or chair more than 50% of waking hours  [] 4 Completely disabled; cannot carry on any selfcare; totally confined to bed or chair  [] 5 Dead      Results:     Labs:  Ordered today in anticipation of treatment     Imaging:  Reviewed above in Onc History     Pathology:  Reviewed above in Onc History     Assessment:      Pathologic prognostic stage IIA (pT2 pN1 cM0) infiltrating ductal carcinoma of the left breast; Dx in 12/2023; Grade 3; ER positive (>95%), KS positive (10%), HER2 negative (2+, neg GIOVANI); Oncotype DX 34 ; S/p L PM and SLNBx     Bisi Reynaga is a very pleasant 54 y.o. postmenopausal female with newly diagnosed breast cancer. Tolerated TC C1 well. Will proceed with C2.         Plan:     Surgical Plan: S/p L PM with SNLBx  Additional biopsy: No further biopsy indicated  Radiation Plan: Referral in Place  Additional staging scans/DEXA/echo: Staging scans not indicated based on current stage, patient history and physical examination.  Additional Path info (i.e Ki67, PDL1): Not  indicated  Gene assays: Oncotype DX 34     Systemic treatment plan: Docetaxel and cyclophosphamide once every 3 weeks for a total of 4 times.               Intent: Curative              Clinical trial: Not eligible for our current trials              Endocrine therapy:  Will discuss at future visit              HER2 treatment: not indicated              Targeted agents:  Will discuss at future visit              Chemotherapy: As discussed above              BMA: Will discuss at future visit.     Access: Not indicated  Supportive meds: Anti-emetics sent to pharmacy  Genetic testing: Completed; negative  Fertility preservation: Not indicated  Other active problems/orders:      We discussed the use cold caps to minimize hair loss and/or decrease to time hair regrowth after chemotherapy. She met with our nursing team to obtain more information. She ultimately elected to pursue this.       Surveillance plan: Continue yearly mammogram     Follow-up:  Prior to C3 TC     Patient expressed understanding of the plan outlined above. She had ample time to ask questions. She understands she can contact us should she have additional questions or issues arise in the interim.     Patient seen and discussed with Dr. Jannette Hines MD   IM PGY-3

## 2024-05-02 NOTE — PATIENT INSTRUCTIONS
Have labs today.  Chemo tomorrow, 5/3/24.   Follow up with Dr. Bhatia prior to Cycle 3.  Please call [phone] with questions or concerns.

## 2024-05-03 ENCOUNTER — INFUSION (OUTPATIENT)
Dept: HEMATOLOGY/ONCOLOGY | Facility: CLINIC | Age: 54
End: 2024-05-03
Payer: COMMERCIAL

## 2024-05-03 VITALS
DIASTOLIC BLOOD PRESSURE: 87 MMHG | OXYGEN SATURATION: 97 % | RESPIRATION RATE: 18 BRPM | HEART RATE: 78 BPM | WEIGHT: 174.38 LBS | TEMPERATURE: 98.6 F | BODY MASS INDEX: 25.74 KG/M2 | SYSTOLIC BLOOD PRESSURE: 141 MMHG

## 2024-05-03 DIAGNOSIS — C50.412 MALIGNANT NEOPLASM OF UPPER-OUTER QUADRANT OF LEFT BREAST IN FEMALE, ESTROGEN RECEPTOR POSITIVE (MULTI): ICD-10-CM

## 2024-05-03 DIAGNOSIS — Z17.0 MALIGNANT NEOPLASM OF UPPER-OUTER QUADRANT OF LEFT BREAST IN FEMALE, ESTROGEN RECEPTOR POSITIVE (MULTI): ICD-10-CM

## 2024-05-03 PROCEDURE — 2500000004 HC RX 250 GENERAL PHARMACY W/ HCPCS (ALT 636 FOR OP/ED): Mod: JW | Performed by: STUDENT IN AN ORGANIZED HEALTH CARE EDUCATION/TRAINING PROGRAM

## 2024-05-03 PROCEDURE — 96417 CHEMO IV INFUS EACH ADDL SEQ: CPT

## 2024-05-03 PROCEDURE — 2500000004 HC RX 250 GENERAL PHARMACY W/ HCPCS (ALT 636 FOR OP/ED): Performed by: STUDENT IN AN ORGANIZED HEALTH CARE EDUCATION/TRAINING PROGRAM

## 2024-05-03 PROCEDURE — 96377 APPLICATON ON-BODY INJECTOR: CPT

## 2024-05-03 PROCEDURE — 96413 CHEMO IV INFUSION 1 HR: CPT

## 2024-05-03 PROCEDURE — 2500000001 HC RX 250 WO HCPCS SELF ADMINISTERED DRUGS (ALT 637 FOR MEDICARE OP): Performed by: STUDENT IN AN ORGANIZED HEALTH CARE EDUCATION/TRAINING PROGRAM

## 2024-05-03 PROCEDURE — 96360 HYDRATION IV INFUSION INIT: CPT | Mod: INF

## 2024-05-03 PROCEDURE — 96375 TX/PRO/DX INJ NEW DRUG ADDON: CPT | Mod: INF

## 2024-05-03 PROCEDURE — 96374 THER/PROPH/DIAG INJ IV PUSH: CPT | Mod: INF

## 2024-05-03 PROCEDURE — 96361 HYDRATE IV INFUSION ADD-ON: CPT | Mod: INF

## 2024-05-03 RX ORDER — DIPHENHYDRAMINE HCL 25 MG
25 CAPSULE ORAL ONCE
Status: COMPLETED | OUTPATIENT
Start: 2024-05-03 | End: 2024-05-03

## 2024-05-03 RX ORDER — DIPHENHYDRAMINE HYDROCHLORIDE 50 MG/ML
50 INJECTION INTRAMUSCULAR; INTRAVENOUS AS NEEDED
Status: DISCONTINUED | OUTPATIENT
Start: 2024-05-03 | End: 2024-05-03 | Stop reason: HOSPADM

## 2024-05-03 RX ORDER — FAMOTIDINE 10 MG/ML
20 INJECTION INTRAVENOUS ONCE AS NEEDED
Status: DISCONTINUED | OUTPATIENT
Start: 2024-05-03 | End: 2024-05-03 | Stop reason: HOSPADM

## 2024-05-03 RX ORDER — EPINEPHRINE 0.3 MG/.3ML
0.3 INJECTION SUBCUTANEOUS EVERY 5 MIN PRN
Status: DISCONTINUED | OUTPATIENT
Start: 2024-05-03 | End: 2024-05-03 | Stop reason: HOSPADM

## 2024-05-03 RX ORDER — PALONOSETRON 0.05 MG/ML
0.25 INJECTION, SOLUTION INTRAVENOUS ONCE
Status: COMPLETED | OUTPATIENT
Start: 2024-05-03 | End: 2024-05-03

## 2024-05-03 RX ORDER — ALBUTEROL SULFATE 0.83 MG/ML
3 SOLUTION RESPIRATORY (INHALATION) AS NEEDED
Status: DISCONTINUED | OUTPATIENT
Start: 2024-05-03 | End: 2024-05-03 | Stop reason: HOSPADM

## 2024-05-03 RX ADMIN — PEGFILGRASTIM 6 MG: KIT SUBCUTANEOUS at 11:21

## 2024-05-03 RX ADMIN — DOCETAXEL 149.25 MG: 20 INJECTION, SOLUTION, CONCENTRATE INTRAVENOUS at 10:07

## 2024-05-03 RX ADMIN — DIPHENHYDRAMINE HYDROCHLORIDE 25 MG: 25 CAPSULE ORAL at 09:24

## 2024-05-03 RX ADMIN — DEXAMETHASONE SODIUM PHOSPHATE 12 MG: 10 INJECTION, SOLUTION INTRAMUSCULAR; INTRAVENOUS at 09:24

## 2024-05-03 RX ADMIN — CYCLOPHOSPHAMIDE 1194 MG: 1 INJECTION, POWDER, FOR SOLUTION INTRAVENOUS; ORAL at 11:18

## 2024-05-03 RX ADMIN — SODIUM CHLORIDE 500 ML: 9 INJECTION, SOLUTION INTRAVENOUS at 09:23

## 2024-05-03 RX ADMIN — PALONOSETRON 250 MCG: 0.05 INJECTION, SOLUTION INTRAVENOUS at 09:24

## 2024-05-03 ASSESSMENT — PAIN SCALES - GENERAL: PAINLEVEL: 0-NO PAIN

## 2024-05-03 NOTE — SIGNIFICANT EVENT

## 2024-05-23 ENCOUNTER — LAB (OUTPATIENT)
Dept: LAB | Facility: LAB | Age: 54
End: 2024-05-23
Payer: COMMERCIAL

## 2024-05-23 ENCOUNTER — OFFICE VISIT (OUTPATIENT)
Dept: HEMATOLOGY/ONCOLOGY | Facility: CLINIC | Age: 54
End: 2024-05-23
Payer: COMMERCIAL

## 2024-05-23 VITALS
HEART RATE: 87 BPM | SYSTOLIC BLOOD PRESSURE: 132 MMHG | DIASTOLIC BLOOD PRESSURE: 86 MMHG | BODY MASS INDEX: 25.68 KG/M2 | WEIGHT: 174 LBS

## 2024-05-23 DIAGNOSIS — Z17.0 MALIGNANT NEOPLASM OF UPPER-OUTER QUADRANT OF LEFT BREAST IN FEMALE, ESTROGEN RECEPTOR POSITIVE (MULTI): ICD-10-CM

## 2024-05-23 DIAGNOSIS — C50.412 MALIGNANT NEOPLASM OF UPPER-OUTER QUADRANT OF LEFT BREAST IN FEMALE, ESTROGEN RECEPTOR POSITIVE (MULTI): ICD-10-CM

## 2024-05-23 LAB
ALBUMIN SERPL BCP-MCNC: 4.2 G/DL (ref 3.4–5)
ALP SERPL-CCNC: 94 U/L (ref 33–110)
ALT SERPL W P-5'-P-CCNC: 28 U/L (ref 7–45)
ANION GAP SERPL CALC-SCNC: 11 MMOL/L (ref 10–20)
AST SERPL W P-5'-P-CCNC: 22 U/L (ref 9–39)
B-HCG SERPL-ACNC: 2 MIU/ML
BASOPHILS # BLD AUTO: 0.06 X10*3/UL (ref 0–0.1)
BASOPHILS NFR BLD AUTO: 0.8 %
BILIRUB SERPL-MCNC: 0.5 MG/DL (ref 0–1.2)
BUN SERPL-MCNC: 10 MG/DL (ref 6–23)
CALCIUM SERPL-MCNC: 9.3 MG/DL (ref 8.6–10.3)
CHLORIDE SERPL-SCNC: 104 MMOL/L (ref 98–107)
CO2 SERPL-SCNC: 30 MMOL/L (ref 21–32)
CREAT SERPL-MCNC: 0.72 MG/DL (ref 0.5–1.05)
EGFRCR SERPLBLD CKD-EPI 2021: >90 ML/MIN/1.73M*2
EOSINOPHIL # BLD AUTO: 0.03 X10*3/UL (ref 0–0.7)
EOSINOPHIL NFR BLD AUTO: 0.4 %
ERYTHROCYTE [DISTWIDTH] IN BLOOD BY AUTOMATED COUNT: 15.5 % (ref 11.5–14.5)
GLUCOSE SERPL-MCNC: 91 MG/DL (ref 74–99)
HCT VFR BLD AUTO: 37.9 % (ref 36–46)
HGB BLD-MCNC: 12.8 G/DL (ref 12–16)
IMM GRANULOCYTES # BLD AUTO: 0.02 X10*3/UL (ref 0–0.7)
IMM GRANULOCYTES NFR BLD AUTO: 0.3 % (ref 0–0.9)
LYMPHOCYTES # BLD AUTO: 1.25 X10*3/UL (ref 1.2–4.8)
LYMPHOCYTES NFR BLD AUTO: 16.2 %
MCH RBC QN AUTO: 29.6 PG (ref 26–34)
MCHC RBC AUTO-ENTMCNC: 33.8 G/DL (ref 32–36)
MCV RBC AUTO: 88 FL (ref 80–100)
MONOCYTES # BLD AUTO: 0.75 X10*3/UL (ref 0.1–1)
MONOCYTES NFR BLD AUTO: 9.7 %
NEUTROPHILS # BLD AUTO: 5.62 X10*3/UL (ref 1.2–7.7)
NEUTROPHILS NFR BLD AUTO: 72.6 %
NRBC BLD-RTO: 0 /100 WBCS (ref 0–0)
PLATELET # BLD AUTO: 213 X10*3/UL (ref 150–450)
POTASSIUM SERPL-SCNC: 4.5 MMOL/L (ref 3.5–5.3)
PROT SERPL-MCNC: 6.7 G/DL (ref 6.4–8.2)
RBC # BLD AUTO: 4.32 X10*6/UL (ref 4–5.2)
SODIUM SERPL-SCNC: 140 MMOL/L (ref 136–145)
WBC # BLD AUTO: 7.7 X10*3/UL (ref 4.4–11.3)

## 2024-05-23 PROCEDURE — 80053 COMPREHEN METABOLIC PANEL: CPT

## 2024-05-23 PROCEDURE — 99214 OFFICE O/P EST MOD 30 MIN: CPT | Performed by: STUDENT IN AN ORGANIZED HEALTH CARE EDUCATION/TRAINING PROGRAM

## 2024-05-23 PROCEDURE — 3075F SYST BP GE 130 - 139MM HG: CPT | Performed by: STUDENT IN AN ORGANIZED HEALTH CARE EDUCATION/TRAINING PROGRAM

## 2024-05-23 PROCEDURE — 84702 CHORIONIC GONADOTROPIN TEST: CPT

## 2024-05-23 PROCEDURE — 3079F DIAST BP 80-89 MM HG: CPT | Performed by: STUDENT IN AN ORGANIZED HEALTH CARE EDUCATION/TRAINING PROGRAM

## 2024-05-23 PROCEDURE — 85025 COMPLETE CBC W/AUTO DIFF WBC: CPT

## 2024-05-23 PROCEDURE — 36415 COLL VENOUS BLD VENIPUNCTURE: CPT

## 2024-05-23 ASSESSMENT — PAIN SCALES - GENERAL: PAINLEVEL: 0-NO PAIN

## 2024-05-23 NOTE — PROGRESS NOTES
Breast Medical Oncology Clinic  Location: [facility]     Visit Type: Follow Up Patient Visit     Oncologic History:     12/21/2023: Screening mammogram: New somewhat spiculated central lateral superior left breast mass and small nodule more anteriorly in the slight lateral left breast.     12/26/2023: Diagnostic breast imaging: Asymmetry in the medial right breast persists on additional imaging.  This is stable since August 2005.  In the left breast there is an irregular high density mass with associated architectural distortion.  An oval circumscribed equal density mass in the central lateral left breast at middle depth persists.  On ultrasound there is no suspicious finding to correspond to the right breast asymmetry.  In the left breast an irregular hypoechoic mass is seen at the 1 o'clock position 8 cm from the nipple measuring 1.8 x 1.2 x 2.0 cm.  At 4:00 3 cm from the nipple there is a cyst.  Targeted ultrasound of the left axilla demonstrates 1 abnormal appearing left axillary lymph node and 4 normal-appearing lymph nodes.     12/29/2023: Left breast mass ultrasound-guided core needle biopsy: Invasive ductal carcinoma, grade 3.  ER positive (greater than 95%) MD positive (10%) HER2 equal focal, not amplified by dual-stephanie.     12/29/2023: Left axillary lymph node biopsy shows metastatic carcinoma     1/29/2024 [facility] BRCA1/2 testing: Negative. Larger hereditary cancer panel also negative.     2/7/2024: Left breast Magseed partial mastectomy with sentinel lymph node biopsy: Pathology yields a single focus of invasive ductal carcinoma, grade 3 measuring 3 cm.  There is high grade DCIS present.  All margins are negative.  1 of 4 lymph nodes examined with macrometastasis.  PT2 pN1a.     3/12/2024: Oncotype DX recurrence score 34     4/12/24: C1D1 adjuvant docetaxel/cyclophosphamide      Subjective: History of Present Illness     Patient presents prior to C3 of adjuvant TC. She reports chemo is overall going well.  "Denies nausea, vomiting, diarrhea, constipation. No neuropathy. Reports mild fatigue. Continues to do well. In good spirits.      Gynecologic History:      Age of first menses: 12 years old  Age of last menses: 52 years old  ; FLB at age 28  Post-menopausal  She did not breastfeed  Uterus/Ovaries: Intact  OCP: 20 years     Pertinent Family history:     Breast Cancer:  Maternal aunt, maternal great aunt  Ovarian Cancer: None  Pancreatic Cancer: None  Other:  Paternal grandmother with colon cancer; father with prostate cancer and skin cancer     Social History  Bisi Reynaga \"Tanya\"  reports that she has never smoked. She has never been exposed to tobacco smoke. She has never used smokeless tobacco.  She  reports current alcohol use.  She  reports no history of drug use.     ROS:      Review of Systems   All other systems reviewed and are negative.        Physical Examination:     Vitals:    24 1305   BP: 132/86   Pulse: 87     Vitals:    24 1305   Weight: 78.9 kg (174 lb)         Physical Exam  Vitals and nursing note reviewed.   Constitutional:       General: She is not in acute distress.     Appearance: Normal appearance. She is not toxic-appearing.   HENT:      Head: Normocephalic and atraumatic.   Eyes:      Conjunctiva/sclera: Conjunctivae normal.   Cardiovascular:      Rate and Rhythm: Normal rate.   Pulmonary:      Effort: Pulmonary effort is normal. No respiratory distress.   Abdominal:      General: Abdomen is flat.      Palpations: Abdomen is soft.   Musculoskeletal:         General: No swelling. Normal range of motion.      Cervical back: Normal range of motion.   Skin:     General: Skin is warm and dry.   Neurological:      General: No focal deficit present.      Mental Status: She is alert.   Psychiatric:         Mood and Affect: Mood normal.            Breast Examination: deferred at today's visit.      Baseline:   Left breast: No masses, skin or nipple changes; well healed " incisions  Right breast: No masses, skin or nipple changes  Axillary: No significant examination findings     ECOG Performance Status:      [x] 0 Fully active, able to carry on all pre-disease performance without restriction  [] 1 Restricted in physically strenuous activity but ambulatory and able to carry out work of a light or sedentary nature, e.g., light house work, office work  [] 2 Ambulatory and capable of all selfcare but unable to carry out any work activities; up and about more than 50% of waking hours  [] 3 Capable of only limited selfcare; confined to bed or chair more than 50% of waking hours  [] 4 Completely disabled; cannot carry on any selfcare; totally confined to bed or chair  [] 5 Dead      Results:     Labs:  Ordered today in anticipation of treatment     Imaging:  Reviewed above in Onc History     Pathology:  Reviewed above in Onc History     Assessment:      Pathologic prognostic stage IIA (pT2 pN1 cM0) infiltrating ductal carcinoma of the left breast; Dx in 12/2023; Grade 3; ER positive (>95%), AL positive (10%), HER2 negative (2+, neg GIOVANI); Oncotype DX 34 ; S/p L PM and SLNBx     Bisi Reynaga is a very pleasant 54 y.o. postmenopausal female with newly diagnosed breast cancer. Tolerated TC C1 well. Will proceed with C2.         Plan:     Surgical Plan: S/p L PM with SNLBx  Additional biopsy: No further biopsy indicated  Radiation Plan: Referral in Place  Additional staging scans/DEXA/echo: Staging scans not indicated based on current stage, patient history and physical examination.  Additional Path info (i.e Ki67, PDL1): Not indicated  Gene assays: Oncotype DX 34     Systemic treatment plan: Docetaxel and cyclophosphamide once every 3 weeks for a total of 4 times.               Intent: Curative              Clinical trial: Not eligible for our current trials              Endocrine therapy:  Will discuss at future visit              HER2 treatment: not indicated              Targeted agents:   Will discuss at future visit              Chemotherapy: As discussed above              BMA: Will discuss at future visit.     Access: Not indicated  Supportive meds: Anti-emetics sent to pharmacy  Genetic testing: Completed; negative  Fertility preservation: Not indicated  Other active problems/orders:      We discussed the use cold caps to minimize hair loss and/or decrease to time hair regrowth after chemotherapy. She met with our nursing team to obtain more information. She ultimately elected to pursue this.       Surveillance plan: Continue yearly mammogram     Follow-up:  Prior to C4 TC     Patient expressed understanding of the plan outlined above. She had ample time to ask questions. She understands she can contact us should she have additional questions or issues arise in the interim.     Patient seen and discussed with Dr. Jannette Bhtaia MD   IM PGY-3

## 2024-05-23 NOTE — PATIENT INSTRUCTIONS
Continue with chemotherapy treatments.  Follow up with Dr. Bhatia as scheduled on 6/6/24.  Please call [phone] with questions or concerns.

## 2024-05-24 ENCOUNTER — SOCIAL WORK (OUTPATIENT)
Dept: HEMATOLOGY/ONCOLOGY | Facility: CLINIC | Age: 54
End: 2024-05-24
Payer: COMMERCIAL

## 2024-05-24 ENCOUNTER — INFUSION (OUTPATIENT)
Dept: HEMATOLOGY/ONCOLOGY | Facility: CLINIC | Age: 54
End: 2024-05-24
Payer: COMMERCIAL

## 2024-05-24 VITALS
HEIGHT: 69 IN | TEMPERATURE: 98.1 F | WEIGHT: 174.82 LBS | DIASTOLIC BLOOD PRESSURE: 91 MMHG | BODY MASS INDEX: 25.89 KG/M2 | SYSTOLIC BLOOD PRESSURE: 127 MMHG | HEART RATE: 86 BPM | RESPIRATION RATE: 18 BRPM | OXYGEN SATURATION: 95 %

## 2024-05-24 DIAGNOSIS — C50.412 MALIGNANT NEOPLASM OF UPPER-OUTER QUADRANT OF LEFT BREAST IN FEMALE, ESTROGEN RECEPTOR POSITIVE (MULTI): ICD-10-CM

## 2024-05-24 DIAGNOSIS — Z17.0 MALIGNANT NEOPLASM OF UPPER-OUTER QUADRANT OF LEFT BREAST IN FEMALE, ESTROGEN RECEPTOR POSITIVE (MULTI): ICD-10-CM

## 2024-05-24 PROCEDURE — 96375 TX/PRO/DX INJ NEW DRUG ADDON: CPT | Mod: INF

## 2024-05-24 PROCEDURE — 96377 APPLICATON ON-BODY INJECTOR: CPT

## 2024-05-24 PROCEDURE — 2500000001 HC RX 250 WO HCPCS SELF ADMINISTERED DRUGS (ALT 637 FOR MEDICARE OP): Performed by: STUDENT IN AN ORGANIZED HEALTH CARE EDUCATION/TRAINING PROGRAM

## 2024-05-24 PROCEDURE — 96413 CHEMO IV INFUSION 1 HR: CPT

## 2024-05-24 PROCEDURE — 2500000004 HC RX 250 GENERAL PHARMACY W/ HCPCS (ALT 636 FOR OP/ED): Performed by: STUDENT IN AN ORGANIZED HEALTH CARE EDUCATION/TRAINING PROGRAM

## 2024-05-24 PROCEDURE — 96417 CHEMO IV INFUS EACH ADDL SEQ: CPT

## 2024-05-24 PROCEDURE — 96372 THER/PROPH/DIAG INJ SC/IM: CPT

## 2024-05-24 RX ORDER — FAMOTIDINE 10 MG/ML
20 INJECTION INTRAVENOUS ONCE AS NEEDED
Status: DISCONTINUED | OUTPATIENT
Start: 2024-05-24 | End: 2024-05-24 | Stop reason: HOSPADM

## 2024-05-24 RX ORDER — DIPHENHYDRAMINE HCL 25 MG
25 CAPSULE ORAL ONCE
Status: COMPLETED | OUTPATIENT
Start: 2024-05-24 | End: 2024-05-24

## 2024-05-24 RX ORDER — EPINEPHRINE 0.3 MG/.3ML
0.3 INJECTION SUBCUTANEOUS EVERY 5 MIN PRN
Status: DISCONTINUED | OUTPATIENT
Start: 2024-05-24 | End: 2024-05-24 | Stop reason: HOSPADM

## 2024-05-24 RX ORDER — DIPHENHYDRAMINE HYDROCHLORIDE 50 MG/ML
50 INJECTION INTRAMUSCULAR; INTRAVENOUS AS NEEDED
Status: DISCONTINUED | OUTPATIENT
Start: 2024-05-24 | End: 2024-05-24 | Stop reason: HOSPADM

## 2024-05-24 RX ORDER — HEPARIN 100 UNIT/ML
500 SYRINGE INTRAVENOUS AS NEEDED
OUTPATIENT
Start: 2024-05-24

## 2024-05-24 RX ORDER — HEPARIN SODIUM,PORCINE/PF 10 UNIT/ML
50 SYRINGE (ML) INTRAVENOUS AS NEEDED
OUTPATIENT
Start: 2024-05-24

## 2024-05-24 RX ORDER — ALBUTEROL SULFATE 0.83 MG/ML
3 SOLUTION RESPIRATORY (INHALATION) AS NEEDED
Status: DISCONTINUED | OUTPATIENT
Start: 2024-05-24 | End: 2024-05-24 | Stop reason: HOSPADM

## 2024-05-24 RX ORDER — PALONOSETRON 0.05 MG/ML
0.25 INJECTION, SOLUTION INTRAVENOUS ONCE
Status: COMPLETED | OUTPATIENT
Start: 2024-05-24 | End: 2024-05-24

## 2024-05-24 RX ADMIN — CYCLOPHOSPHAMIDE 1194 MG: 1 INJECTION, POWDER, FOR SOLUTION INTRAVENOUS; ORAL at 11:46

## 2024-05-24 RX ADMIN — DIPHENHYDRAMINE HYDROCHLORIDE 25 MG: 25 CAPSULE ORAL at 10:12

## 2024-05-24 RX ADMIN — DEXAMETHASONE SODIUM PHOSPHATE 12 MG: 10 INJECTION, SOLUTION INTRAMUSCULAR; INTRAVENOUS at 10:12

## 2024-05-24 RX ADMIN — PEGFILGRASTIM 6 MG: KIT SUBCUTANEOUS at 12:28

## 2024-05-24 RX ADMIN — PALONOSETRON 250 MCG: 0.05 INJECTION, SOLUTION INTRAVENOUS at 10:09

## 2024-05-24 RX ADMIN — SODIUM CHLORIDE 500 ML: 9 INJECTION, SOLUTION INTRAVENOUS at 10:10

## 2024-05-24 RX ADMIN — DOCETAXEL 149.25 MG: 20 INJECTION, SOLUTION, CONCENTRATE INTRAVENOUS at 10:34

## 2024-05-24 ASSESSMENT — PAIN SCALES - GENERAL: PAINLEVEL: 0-NO PAIN

## 2024-05-24 NOTE — PROGRESS NOTES
Followed up with pt. Pt receiving her 3rd AC today. Pt has done really well per pt. Pt has been eating every two hours and this has worked for her. Pt notices about 3 days after usually the Monday following treatment she is fatigued. Pt also indicates her young grd dtr keeps bringing home colds and bugs and she has been getting them all. Pt explains she could not sleep well last night due to her cough and allergies. Pt feeling if she could sleep better she would feel better over all. Pt is trying to do her best to stay healthy and active. Pt continues to work and has been able to go about her normal routine. Pt is a pro at putting on the cold cap and she has felt it has been able to allow her to hold onto a good amount of her hair even though there is thinning. Provided support and let her know her strength and determination is making this easier for her.

## 2024-06-03 DIAGNOSIS — K21.00 GASTROESOPHAGEAL REFLUX DISEASE WITH ESOPHAGITIS WITHOUT HEMORRHAGE: ICD-10-CM

## 2024-06-03 RX ORDER — PANTOPRAZOLE SODIUM 40 MG/1
40 TABLET, DELAYED RELEASE ORAL DAILY
Qty: 90 TABLET | Refills: 3 | Status: SHIPPED | OUTPATIENT
Start: 2024-06-03 | End: 2024-06-03 | Stop reason: SDUPTHER

## 2024-06-03 RX ORDER — PANTOPRAZOLE SODIUM 40 MG/1
40 TABLET, DELAYED RELEASE ORAL DAILY
Qty: 90 TABLET | Refills: 3 | Status: SHIPPED | OUTPATIENT
Start: 2024-06-03 | End: 2025-06-03

## 2024-06-06 ENCOUNTER — OFFICE VISIT (OUTPATIENT)
Dept: HEMATOLOGY/ONCOLOGY | Facility: CLINIC | Age: 54
End: 2024-06-06
Payer: COMMERCIAL

## 2024-06-06 VITALS
SYSTOLIC BLOOD PRESSURE: 129 MMHG | DIASTOLIC BLOOD PRESSURE: 83 MMHG | HEART RATE: 78 BPM | WEIGHT: 174.49 LBS | BODY MASS INDEX: 25.76 KG/M2

## 2024-06-06 DIAGNOSIS — Z17.0 MALIGNANT NEOPLASM OF UPPER-OUTER QUADRANT OF LEFT BREAST IN FEMALE, ESTROGEN RECEPTOR POSITIVE (MULTI): ICD-10-CM

## 2024-06-06 DIAGNOSIS — C50.412 MALIGNANT NEOPLASM OF UPPER-OUTER QUADRANT OF LEFT BREAST IN FEMALE, ESTROGEN RECEPTOR POSITIVE (MULTI): ICD-10-CM

## 2024-06-06 PROCEDURE — 3079F DIAST BP 80-89 MM HG: CPT | Performed by: STUDENT IN AN ORGANIZED HEALTH CARE EDUCATION/TRAINING PROGRAM

## 2024-06-06 PROCEDURE — 99215 OFFICE O/P EST HI 40 MIN: CPT | Performed by: STUDENT IN AN ORGANIZED HEALTH CARE EDUCATION/TRAINING PROGRAM

## 2024-06-06 PROCEDURE — 3074F SYST BP LT 130 MM HG: CPT | Performed by: STUDENT IN AN ORGANIZED HEALTH CARE EDUCATION/TRAINING PROGRAM

## 2024-06-06 ASSESSMENT — PAIN SCALES - GENERAL: PAINLEVEL: 0-NO PAIN

## 2024-06-06 NOTE — PROGRESS NOTES
Breast Medical Oncology Clinic  Location: [facility]     Visit Type: Follow Up Patient Visit     Oncologic History:     12/21/2023: Screening mammogram: New somewhat spiculated central lateral superior left breast mass and small nodule more anteriorly in the slight lateral left breast.     12/26/2023: Diagnostic breast imaging: Asymmetry in the medial right breast persists on additional imaging.  This is stable since August 2005.  In the left breast there is an irregular high density mass with associated architectural distortion.  An oval circumscribed equal density mass in the central lateral left breast at middle depth persists.  On ultrasound there is no suspicious finding to correspond to the right breast asymmetry.  In the left breast an irregular hypoechoic mass is seen at the 1 o'clock position 8 cm from the nipple measuring 1.8 x 1.2 x 2.0 cm.  At 4:00 3 cm from the nipple there is a cyst.  Targeted ultrasound of the left axilla demonstrates 1 abnormal appearing left axillary lymph node and 4 normal-appearing lymph nodes.     12/29/2023: Left breast mass ultrasound-guided core needle biopsy: Invasive ductal carcinoma, grade 3.  ER positive (greater than 95%) VT positive (10%) HER2 equal focal, not amplified by dual-stephanie.     12/29/2023: Left axillary lymph node biopsy shows metastatic carcinoma     1/29/2024 [facility] BRCA1/2 testing: Negative. Larger hereditary cancer panel also negative.     2/7/2024: Left breast Magseed partial mastectomy with sentinel lymph node biopsy: Pathology yields a single focus of invasive ductal carcinoma, grade 3 measuring 3 cm.  There is high grade DCIS present.  All margins are negative.  1 of 4 lymph nodes examined with macrometastasis.  PT2 pN1a.     3/12/2024: Oncotype DX recurrence score 34     4/12/24: C1D1 adjuvant docetaxel/cyclophosphamide      Subjective: History of Present Illness     Patient presents prior to C4 of adjuvant TC.     Overall doing very well. Reports URI  "symptoms for 1-2 weeks after each treatment without fevers/chills.     Denies nausea, vomiting, body ache or neuropathy.    Gynecologic History:      Age of first menses: 12 years old  Age of last menses: 52 years old  ; FLB at age 28  Post-menopausal  She did not breastfeed  Uterus/Ovaries: Intact  OCP: 20 years     Pertinent Family history:     Breast Cancer:  Maternal aunt, maternal great aunt  Ovarian Cancer: None  Pancreatic Cancer: None  Other:  Paternal grandmother with colon cancer; father with prostate cancer and skin cancer     Social History  Bisi Reynaga \"Tanya\"  reports that she has never smoked. She has never been exposed to tobacco smoke. She has never used smokeless tobacco.  She  reports current alcohol use.  She  reports no history of drug use.     ROS:      Review of Systems   All other systems reviewed and are negative.        Physical Examination:     Vitals:    24 1106   BP: 129/83   Pulse: 78     Vitals:    24 1106   Weight: 79.2 kg (174 lb 7.9 oz)         Physical Exam  Vitals and nursing note reviewed.   Constitutional:       General: She is not in acute distress.     Appearance: Normal appearance. She is not toxic-appearing.   HENT:      Head: Normocephalic and atraumatic.   Eyes:      Conjunctiva/sclera: Conjunctivae normal.   Cardiovascular:      Rate and Rhythm: Normal rate.   Pulmonary:      Effort: Pulmonary effort is normal. No respiratory distress.   Abdominal:      General: Abdomen is flat.      Palpations: Abdomen is soft.   Musculoskeletal:         General: No swelling. Normal range of motion.      Cervical back: Normal range of motion.   Skin:     General: Skin is warm and dry.   Neurological:      General: No focal deficit present.      Mental Status: She is alert.   Psychiatric:         Mood and Affect: Mood normal.            Breast Examination: deferred at today's visit.      Baseline:   Left breast: No masses, skin or nipple changes; well healed " incisions  Right breast: No masses, skin or nipple changes  Axillary: No significant examination findings     ECOG Performance Status:      [x] 0 Fully active, able to carry on all pre-disease performance without restriction  [] 1 Restricted in physically strenuous activity but ambulatory and able to carry out work of a light or sedentary nature, e.g., light house work, office work  [] 2 Ambulatory and capable of all selfcare but unable to carry out any work activities; up and about more than 50% of waking hours  [] 3 Capable of only limited selfcare; confined to bed or chair more than 50% of waking hours  [] 4 Completely disabled; cannot carry on any selfcare; totally confined to bed or chair  [] 5 Dead      Results:     Labs:  Ordered today in anticipation of treatment     Imaging:  Reviewed above in Onc History     Pathology:  Reviewed above in Onc History     Assessment:      Pathologic prognostic stage IIA (pT2 pN1 cM0) infiltrating ductal carcinoma of the left breast; Dx in 12/2023; Grade 3; ER positive (>95%), ME positive (10%), HER2 negative (2+, neg GIOVANI); Oncotype DX 34 ; S/p L PM and SLNBx     Bisi Reynaga is a very pleasant 54 y.o. postmenopausal female with newly diagnosed breast cancer. Tolerating treatment well. C4 TC due 6/14.         Plan:     Surgical Plan: S/p L PM with SNLBx  Additional biopsy: No further biopsy indicated  Radiation Plan: Referral in Place  Additional staging scans/DEXA/echo: Staging scans not indicated based on current stage, patient history and physical examination. Will order baseline DEXA scan in anticipation of long term endocrine therapy.    Additional Path info (i.e Ki67, PDL1): Not indicated  Gene assays: Oncotype DX 34     Systemic treatment plan: Docetaxel and cyclophosphamide once every 3 weeks for a total of 4 times. We discussed anastrozole and adjuvant verzenio that would be started after radiation. We discussed the regimen, schedule, common toxicities, strategies  for managing toxicities and monitoring parameters. The patient had time to ask questions. Written information given today for her review. We will finalize adjuvant treatment plan toward the end of radiation.               Intent: Curative              Clinical trial: Not eligible for our current trials              Endocrine therapy:  Anastrozole              HER2 treatment: not indicated              Targeted agents:  Verzenio              Chemotherapy: TCx4              BMA: Will discuss at future visit.     Access: Not indicated  Supportive meds: Anti-emetics sent to pharmacy  Genetic testing: Completed; negative  Fertility preservation: Not indicated  Other active problems/orders:      We discussed the use cold caps to minimize hair loss and/or decrease to time hair regrowth after chemotherapy. She met with our nursing team to obtain more information. She ultimately elected to pursue this.       Surveillance plan: Continue yearly mammogram     Follow-up:  6 weeks     Patient expressed understanding of the plan outlined above. She had ample time to ask questions. She understands she can contact us should she have additional questions or issues arise in the interim.

## 2024-06-06 NOTE — PATIENT INSTRUCTIONS
Proceed with chemo on 6/14, and have labs drawn day prior.   Follow up with Dr. Fried-Radiation.  Schedule DEXA scan.  Handouts on Verzenio and Anastrozole provided.  Follow up with Dr. Bhatia in 6-8 weeks.   Please call [phone] with questions or concerns.

## 2024-06-13 ENCOUNTER — LAB (OUTPATIENT)
Dept: LAB | Facility: LAB | Age: 54
End: 2024-06-13
Payer: COMMERCIAL

## 2024-06-13 ENCOUNTER — TELEPHONE (OUTPATIENT)
Dept: HEMATOLOGY/ONCOLOGY | Facility: HOSPITAL | Age: 54
End: 2024-06-13
Payer: COMMERCIAL

## 2024-06-13 DIAGNOSIS — C50.412 MALIGNANT NEOPLASM OF UPPER-OUTER QUADRANT OF LEFT BREAST IN FEMALE, ESTROGEN RECEPTOR POSITIVE (MULTI): ICD-10-CM

## 2024-06-13 DIAGNOSIS — Z17.0 MALIGNANT NEOPLASM OF UPPER-OUTER QUADRANT OF LEFT BREAST IN FEMALE, ESTROGEN RECEPTOR POSITIVE (MULTI): ICD-10-CM

## 2024-06-13 LAB
ALBUMIN SERPL BCP-MCNC: 4.5 G/DL (ref 3.4–5)
ALP SERPL-CCNC: 74 U/L (ref 33–110)
ALT SERPL W P-5'-P-CCNC: 39 U/L (ref 7–45)
ANION GAP SERPL CALC-SCNC: 13 MMOL/L (ref 10–20)
AST SERPL W P-5'-P-CCNC: 25 U/L (ref 9–39)
B-HCG SERPL-ACNC: 2 MIU/ML
BASOPHILS # BLD AUTO: 0.06 X10*3/UL (ref 0–0.1)
BASOPHILS NFR BLD AUTO: 1.3 %
BILIRUB SERPL-MCNC: 0.6 MG/DL (ref 0–1.2)
BUN SERPL-MCNC: 12 MG/DL (ref 6–23)
CALCIUM SERPL-MCNC: 9.7 MG/DL (ref 8.6–10.3)
CHLORIDE SERPL-SCNC: 105 MMOL/L (ref 98–107)
CO2 SERPL-SCNC: 28 MMOL/L (ref 21–32)
CREAT SERPL-MCNC: 0.69 MG/DL (ref 0.5–1.05)
EGFRCR SERPLBLD CKD-EPI 2021: >90 ML/MIN/1.73M*2
EOSINOPHIL # BLD AUTO: 0.02 X10*3/UL (ref 0–0.7)
EOSINOPHIL NFR BLD AUTO: 0.4 %
ERYTHROCYTE [DISTWIDTH] IN BLOOD BY AUTOMATED COUNT: 16.4 % (ref 11.5–14.5)
GLUCOSE SERPL-MCNC: 85 MG/DL (ref 74–99)
HCT VFR BLD AUTO: 41.3 % (ref 36–46)
HGB BLD-MCNC: 13.3 G/DL (ref 12–16)
IMM GRANULOCYTES # BLD AUTO: 0.01 X10*3/UL (ref 0–0.7)
IMM GRANULOCYTES NFR BLD AUTO: 0.2 % (ref 0–0.9)
LYMPHOCYTES # BLD AUTO: 1.22 X10*3/UL (ref 1.2–4.8)
LYMPHOCYTES NFR BLD AUTO: 26.9 %
MCH RBC QN AUTO: 29.4 PG (ref 26–34)
MCHC RBC AUTO-ENTMCNC: 32.2 G/DL (ref 32–36)
MCV RBC AUTO: 91 FL (ref 80–100)
MONOCYTES # BLD AUTO: 0.46 X10*3/UL (ref 0.1–1)
MONOCYTES NFR BLD AUTO: 10.1 %
NEUTROPHILS # BLD AUTO: 2.77 X10*3/UL (ref 1.2–7.7)
NEUTROPHILS NFR BLD AUTO: 61.1 %
NRBC BLD-RTO: 0 /100 WBCS (ref 0–0)
PLATELET # BLD AUTO: 271 X10*3/UL (ref 150–450)
POTASSIUM SERPL-SCNC: 4.7 MMOL/L (ref 3.5–5.3)
PROT SERPL-MCNC: 7 G/DL (ref 6.4–8.2)
RBC # BLD AUTO: 4.53 X10*6/UL (ref 4–5.2)
SODIUM SERPL-SCNC: 141 MMOL/L (ref 136–145)
WBC # BLD AUTO: 4.5 X10*3/UL (ref 4.4–11.3)

## 2024-06-13 PROCEDURE — 84702 CHORIONIC GONADOTROPIN TEST: CPT

## 2024-06-13 PROCEDURE — 80053 COMPREHEN METABOLIC PANEL: CPT

## 2024-06-13 PROCEDURE — 85025 COMPLETE CBC W/AUTO DIFF WBC: CPT

## 2024-06-13 PROCEDURE — 36415 COLL VENOUS BLD VENIPUNCTURE: CPT

## 2024-06-13 NOTE — TELEPHONE ENCOUNTER
Called lab and they were notified orders were placed. Called patient and she was currently in the cafeteria at [employer]. She will go back to lab now.

## 2024-06-14 ENCOUNTER — INFUSION (OUTPATIENT)
Dept: HEMATOLOGY/ONCOLOGY | Facility: CLINIC | Age: 54
End: 2024-06-14
Payer: COMMERCIAL

## 2024-06-14 VITALS
SYSTOLIC BLOOD PRESSURE: 141 MMHG | DIASTOLIC BLOOD PRESSURE: 88 MMHG | OXYGEN SATURATION: 98 % | HEART RATE: 75 BPM | WEIGHT: 174.16 LBS | TEMPERATURE: 98.2 F | BODY MASS INDEX: 25.8 KG/M2 | HEIGHT: 69 IN | RESPIRATION RATE: 18 BRPM

## 2024-06-14 DIAGNOSIS — Z17.0 MALIGNANT NEOPLASM OF UPPER-OUTER QUADRANT OF LEFT BREAST IN FEMALE, ESTROGEN RECEPTOR POSITIVE (MULTI): ICD-10-CM

## 2024-06-14 DIAGNOSIS — C50.412 MALIGNANT NEOPLASM OF UPPER-OUTER QUADRANT OF LEFT BREAST IN FEMALE, ESTROGEN RECEPTOR POSITIVE (MULTI): ICD-10-CM

## 2024-06-14 PROCEDURE — 96374 THER/PROPH/DIAG INJ IV PUSH: CPT | Mod: INF

## 2024-06-14 PROCEDURE — 96375 TX/PRO/DX INJ NEW DRUG ADDON: CPT | Mod: INF

## 2024-06-14 PROCEDURE — 96377 APPLICATON ON-BODY INJECTOR: CPT

## 2024-06-14 PROCEDURE — 96360 HYDRATION IV INFUSION INIT: CPT | Mod: INF

## 2024-06-14 PROCEDURE — 2500000004 HC RX 250 GENERAL PHARMACY W/ HCPCS (ALT 636 FOR OP/ED): Performed by: STUDENT IN AN ORGANIZED HEALTH CARE EDUCATION/TRAINING PROGRAM

## 2024-06-14 PROCEDURE — 96417 CHEMO IV INFUS EACH ADDL SEQ: CPT

## 2024-06-14 PROCEDURE — 96413 CHEMO IV INFUSION 1 HR: CPT

## 2024-06-14 PROCEDURE — 2500000001 HC RX 250 WO HCPCS SELF ADMINISTERED DRUGS (ALT 637 FOR MEDICARE OP): Performed by: STUDENT IN AN ORGANIZED HEALTH CARE EDUCATION/TRAINING PROGRAM

## 2024-06-14 RX ORDER — ALBUTEROL SULFATE 0.83 MG/ML
3 SOLUTION RESPIRATORY (INHALATION) AS NEEDED
Status: DISCONTINUED | OUTPATIENT
Start: 2024-06-14 | End: 2024-06-14 | Stop reason: HOSPADM

## 2024-06-14 RX ORDER — EPINEPHRINE 0.3 MG/.3ML
0.3 INJECTION SUBCUTANEOUS EVERY 5 MIN PRN
Status: DISCONTINUED | OUTPATIENT
Start: 2024-06-14 | End: 2024-06-14 | Stop reason: HOSPADM

## 2024-06-14 RX ORDER — DIPHENHYDRAMINE HCL 25 MG
25 CAPSULE ORAL ONCE
Status: COMPLETED | OUTPATIENT
Start: 2024-06-14 | End: 2024-06-14

## 2024-06-14 RX ORDER — PALONOSETRON 0.05 MG/ML
0.25 INJECTION, SOLUTION INTRAVENOUS ONCE
Status: COMPLETED | OUTPATIENT
Start: 2024-06-14 | End: 2024-06-14

## 2024-06-14 RX ORDER — FAMOTIDINE 10 MG/ML
20 INJECTION INTRAVENOUS ONCE AS NEEDED
Status: DISCONTINUED | OUTPATIENT
Start: 2024-06-14 | End: 2024-06-14 | Stop reason: HOSPADM

## 2024-06-14 RX ORDER — DIPHENHYDRAMINE HYDROCHLORIDE 50 MG/ML
50 INJECTION INTRAMUSCULAR; INTRAVENOUS AS NEEDED
Status: DISCONTINUED | OUTPATIENT
Start: 2024-06-14 | End: 2024-06-14 | Stop reason: HOSPADM

## 2024-06-14 ASSESSMENT — PAIN SCALES - GENERAL: PAINLEVEL: 0-NO PAIN

## 2024-06-14 NOTE — SIGNIFICANT EVENT

## 2024-06-20 ENCOUNTER — HOSPITAL ENCOUNTER (OUTPATIENT)
Dept: RADIOLOGY | Facility: CLINIC | Age: 54
Discharge: HOME | End: 2024-06-20
Payer: COMMERCIAL

## 2024-06-20 DIAGNOSIS — C50.412 MALIGNANT NEOPLASM OF UPPER-OUTER QUADRANT OF LEFT BREAST IN FEMALE, ESTROGEN RECEPTOR POSITIVE (MULTI): ICD-10-CM

## 2024-06-20 DIAGNOSIS — Z17.0 MALIGNANT NEOPLASM OF UPPER-OUTER QUADRANT OF LEFT BREAST IN FEMALE, ESTROGEN RECEPTOR POSITIVE (MULTI): ICD-10-CM

## 2024-06-20 PROCEDURE — 77080 DXA BONE DENSITY AXIAL: CPT

## 2024-07-03 ENCOUNTER — HOSPITAL ENCOUNTER (OUTPATIENT)
Dept: RADIOLOGY | Facility: CLINIC | Age: 54
Discharge: HOME | End: 2024-07-03
Payer: COMMERCIAL

## 2024-07-03 ENCOUNTER — HOSPITAL ENCOUNTER (OUTPATIENT)
Dept: RADIATION ONCOLOGY | Facility: CLINIC | Age: 54
Setting detail: RADIATION/ONCOLOGY SERIES
Discharge: HOME | End: 2024-07-03
Payer: COMMERCIAL

## 2024-07-03 VITALS
RESPIRATION RATE: 18 BRPM | OXYGEN SATURATION: 98 % | HEART RATE: 69 BPM | WEIGHT: 175.71 LBS | BODY MASS INDEX: 26.02 KG/M2 | TEMPERATURE: 96.7 F | DIASTOLIC BLOOD PRESSURE: 90 MMHG | SYSTOLIC BLOOD PRESSURE: 148 MMHG | HEIGHT: 69 IN

## 2024-07-03 DIAGNOSIS — Z17.0 MALIGNANT NEOPLASM OF UPPER-OUTER QUADRANT OF LEFT BREAST IN FEMALE, ESTROGEN RECEPTOR POSITIVE (MULTI): ICD-10-CM

## 2024-07-03 DIAGNOSIS — C50.412 MALIGNANT NEOPLASM OF UPPER-OUTER QUADRANT OF LEFT BREAST IN FEMALE, ESTROGEN RECEPTOR POSITIVE (MULTI): ICD-10-CM

## 2024-07-03 DIAGNOSIS — Z17.0 MALIGNANT NEOPLASM OF UPPER-OUTER QUADRANT OF LEFT BREAST IN FEMALE, ESTROGEN RECEPTOR POSITIVE (MULTI): Primary | ICD-10-CM

## 2024-07-03 DIAGNOSIS — C50.412 MALIGNANT NEOPLASM OF UPPER-OUTER QUADRANT OF LEFT BREAST IN FEMALE, ESTROGEN RECEPTOR POSITIVE (MULTI): Primary | ICD-10-CM

## 2024-07-03 PROCEDURE — 99214 OFFICE O/P EST MOD 30 MIN: CPT | Performed by: RADIOLOGY

## 2024-07-03 RX ORDER — LORATADINE 10 MG/1
10 TABLET ORAL DAILY
COMMUNITY

## 2024-07-03 ASSESSMENT — LIFESTYLE VARIABLES
HOW OFTEN DO YOU HAVE SIX OR MORE DRINKS ON ONE OCCASION: NEVER
HOW OFTEN DO YOU HAVE A DRINK CONTAINING ALCOHOL: MONTHLY OR LESS
SKIP TO QUESTIONS 9-10: 1
AUDIT-C TOTAL SCORE: 1
HOW MANY STANDARD DRINKS CONTAINING ALCOHOL DO YOU HAVE ON A TYPICAL DAY: 1 OR 2

## 2024-07-03 ASSESSMENT — ENCOUNTER SYMPTOMS
RESPIRATORY NEGATIVE: 1
EYES NEGATIVE: 1
HOT FLASHES: 1
HEMATOLOGIC/LYMPHATIC NEGATIVE: 1
GASTROINTESTINAL NEGATIVE: 1
NEUROLOGICAL NEGATIVE: 1
CONSTITUTIONAL NEGATIVE: 1
PSYCHIATRIC NEGATIVE: 1
MUSCULOSKELETAL NEGATIVE: 1
CARDIOVASCULAR NEGATIVE: 1

## 2024-07-03 ASSESSMENT — COLUMBIA-SUICIDE SEVERITY RATING SCALE - C-SSRS
2. HAVE YOU ACTUALLY HAD ANY THOUGHTS OF KILLING YOURSELF?: NO
1. IN THE PAST MONTH, HAVE YOU WISHED YOU WERE DEAD OR WISHED YOU COULD GO TO SLEEP AND NOT WAKE UP?: NO
6. HAVE YOU EVER DONE ANYTHING, STARTED TO DO ANYTHING, OR PREPARED TO DO ANYTHING TO END YOUR LIFE?: NO

## 2024-07-03 ASSESSMENT — PAIN SCALES - GENERAL: PAINLEVEL: 0-NO PAIN

## 2024-07-03 NOTE — PROGRESS NOTES
Radiation Oncology Nursing Note    Pain: The patient's current pain level was assessed.  They report currently having a pain of 0 out of 10.  They feel their pain is under control without the use of pain medications.    Review of Systems:  Review of Systems   Constitutional: Negative.    HENT:  Negative.     Eyes: Negative.    Respiratory: Negative.     Cardiovascular: Negative.    Gastrointestinal: Negative.    Endocrine: Positive for hot flashes.   Genitourinary: Negative.     Musculoskeletal: Negative.    Skin:  Positive for rash (right forearm.  clearing up. no pain.).   Neurological: Negative.    Hematological: Negative.    Psychiatric/Behavioral: Negative.

## 2024-07-03 NOTE — PROGRESS NOTES
"Radiation Oncology Follow-Up    Patient Name:  Bisi Reynaga  MRN:  [id number]  :  1970    Referring Provider: Raegan Herrera MD  Primary Care Provider: Tre Raymond DO  Care Team: Patient Care Team:  Tre Raymond DO as PCP - General  DOMENICO Canales-CNP as PCP - Maureen FREEMAN PCP  Silvia Bhatia MD as Consulting Physician (Hematology and Oncology)    Date of Service: 7/3/2024    SUBJECTIVE  History of Present Illness:  Bisi Reynaga \"Marlon" is a 54 y.o. female who was previously seen at the [facility] Department of Radiation Oncology for her left-sided breast cancer.    I met Ms. Reynaga on 3/26/2024.  At that time she had completed surgery for her node positive left breast cancer.  Her Oncotype returned as 34 and she received adjuvant chemotherapy consisting of 4 cycles of TC from 2024 through 2024.  Ms. Reynaga returns today to initiate the radiation therapy planning process.    Treatment Rendered:   No radiation treatments to show. (Treatments may have been administered in another system.)       Review of Systems:   Review of Systems - Oncology.  See nursing note for review of systems.    Performance Status:   The Karnofsky performance scale today is 100, Fully active, able to carry on all pre-disease performed without restriction (ECOG equivalent 0).       OBJECTIVE  Vital Signs:  /90 (BP Location: Right arm, Patient Position: Sitting, BP Cuff Size: Adult)   Pulse 69   Temp 35.9 °C (96.7 °F) (Temporal)   Resp 18   Ht 1.753 m (5' 9.02\")   Wt 79.7 kg (175 lb 11.3 oz)   SpO2 98%   BMI 25.94 kg/m²   Physical Exam  Constitutional:       Appearance: Normal appearance.   HENT:      Head: Normocephalic and atraumatic.   Eyes:      Conjunctiva/sclera: Conjunctivae normal.   Cardiovascular:      Heart sounds: Normal heart sounds.   Pulmonary:      Breath sounds: Normal breath sounds.   Chest:          Comments: Examination of the left breast " reveals a well-healed incision superiorly.  There are no suspicious nodules, nipple retraction or discharge bilaterally.  Abdominal:      Palpations: Abdomen is soft.   Musculoskeletal:      Right lower leg: No edema.      Left lower leg: No edema.   Lymphadenopathy:      Upper Body:      Right upper body: No supraclavicular or axillary adenopathy.      Left upper body: No supraclavicular or axillary adenopathy.   Skin:     Comments: There is an area of darkening over her right forearm which emerged following a bug bite.   Neurological:      Mental Status: She is alert.            ASSESSMENT:   Bisi Reynaga is a 54 y.o. female with Malignant neoplasm of upper-outer quadrant of left breast in female, estrogen receptor positive (Multi), Clinical: Stage IIA (cT1c, cN1, cM0, G3, ER+, PA+, HER2-)  Malignant neoplasm of upper-outer quadrant of left breast in female, estrogen receptor positive (Multi), Pathologic: Stage IIA (pT2, pN1a(sn), cM0, G3, ER+, PA+, HER2-).  She is status post breast conserving surgery followed by adjuvant chemotherapy.    PLAN: I again reviewed with the patient the rationale, risks, benefits, and alternatives of radiation therapy as a component of breast conserving surgery.  We did discuss the pros and cons of treating the breast alone versus breast and comprehensive nodes as well as the study randomly assigning patients to one versus the other.  We did discuss that she would not be eligible for the study given her high Oncotype score.  She has decided to proceed with radiation to the breast and low axilla.  The side effects of radiation discussed included but were not limited to skin irritation with possible breakdown, fatigue, possible poor cosmetic outcome, rib fragility, pulmonary toxicity, cardiac toxicity, lymphedema and the possibility of a secondary malignancy induced by the radiation.  We also discussed the importance of not getting pregnant during the radiation.  She voices  understanding and wishes to proceed.  I will simulate her today and start the radiation as soon as the plan is complete.    NCCN Guidelines were applicable to guide this patients treatment plan.  Mila Fried MD

## 2024-07-09 ENCOUNTER — HOSPITAL ENCOUNTER (OUTPATIENT)
Dept: RADIATION ONCOLOGY | Facility: CLINIC | Age: 54
Setting detail: RADIATION/ONCOLOGY SERIES
Discharge: HOME | End: 2024-07-09
Payer: COMMERCIAL

## 2024-07-09 PROCEDURE — 77334 RADIATION TREATMENT AID(S): CPT | Performed by: RADIOLOGY

## 2024-07-09 PROCEDURE — 77293 RESPIRATOR MOTION MGMT SIMUL: CPT | Performed by: RADIOLOGY

## 2024-07-09 PROCEDURE — 77295 3-D RADIOTHERAPY PLAN: CPT | Performed by: RADIOLOGY

## 2024-07-09 PROCEDURE — 77300 RADIATION THERAPY DOSE PLAN: CPT | Performed by: RADIOLOGY

## 2024-07-19 ENCOUNTER — HOSPITAL ENCOUNTER (OUTPATIENT)
Dept: RADIATION ONCOLOGY | Facility: CLINIC | Age: 54
Setting detail: RADIATION/ONCOLOGY SERIES
Discharge: HOME | End: 2024-07-19
Payer: COMMERCIAL

## 2024-07-19 PROCEDURE — 77280 THER RAD SIMULAJ FIELD SMPL: CPT | Performed by: RADIOLOGY

## 2024-07-22 ENCOUNTER — HOSPITAL ENCOUNTER (OUTPATIENT)
Dept: RADIATION ONCOLOGY | Facility: CLINIC | Age: 54
Setting detail: RADIATION/ONCOLOGY SERIES
Discharge: HOME | End: 2024-07-22
Payer: COMMERCIAL

## 2024-07-22 DIAGNOSIS — C50.412 MALIGNANT NEOPLASM OF UPPER-OUTER QUADRANT OF LEFT FEMALE BREAST (MULTI): ICD-10-CM

## 2024-07-22 DIAGNOSIS — Z51.0 ENCOUNTER FOR ANTINEOPLASTIC RADIATION THERAPY: ICD-10-CM

## 2024-07-22 DIAGNOSIS — C77.3 SECONDARY AND UNSPECIFIED MALIGNANT NEOPLASM OF AXILLA AND UPPER LIMB LYMPH NODES (MULTI): ICD-10-CM

## 2024-07-22 LAB
RAD ONC MSQ ACTUAL FRACTIONS DELIVERED: 1
RAD ONC MSQ ACTUAL SESSION DELIVERED DOSE: 266 CGRAY
RAD ONC MSQ ACTUAL TOTAL DOSE: 266 CGRAY
RAD ONC MSQ ELAPSED DAYS: 0
RAD ONC MSQ LAST DATE: NORMAL
RAD ONC MSQ PRESCRIBED FRACTIONAL DOSE: 266 CGRAY
RAD ONC MSQ PRESCRIBED NUMBER OF FRACTIONS: 16
RAD ONC MSQ PRESCRIBED TECHNIQUE: NORMAL
RAD ONC MSQ PRESCRIBED TOTAL DOSE: 4256 CGRAY
RAD ONC MSQ PRESCRIPTION PATTERN COMMENT: NORMAL
RAD ONC MSQ START DATE: NORMAL
RAD ONC MSQ TREATMENT COURSE NUMBER: 1
RAD ONC MSQ TREATMENT SITE: NORMAL

## 2024-07-22 PROCEDURE — 77387 GUIDANCE FOR RADJ TX DLVR: CPT | Performed by: RADIOLOGY

## 2024-07-22 PROCEDURE — 77336 RADIATION PHYSICS CONSULT: CPT | Performed by: RADIOLOGY

## 2024-07-22 PROCEDURE — 77412 RADIATION TX DELIVERY LVL 3: CPT | Performed by: RADIOLOGY

## 2024-07-23 ENCOUNTER — HOSPITAL ENCOUNTER (OUTPATIENT)
Dept: RADIATION ONCOLOGY | Facility: CLINIC | Age: 54
Setting detail: RADIATION/ONCOLOGY SERIES
Discharge: HOME | End: 2024-07-23
Payer: COMMERCIAL

## 2024-07-23 DIAGNOSIS — Z51.0 ENCOUNTER FOR ANTINEOPLASTIC RADIATION THERAPY: ICD-10-CM

## 2024-07-23 DIAGNOSIS — C77.3 SECONDARY AND UNSPECIFIED MALIGNANT NEOPLASM OF AXILLA AND UPPER LIMB LYMPH NODES (MULTI): ICD-10-CM

## 2024-07-23 DIAGNOSIS — C50.412 MALIGNANT NEOPLASM OF UPPER-OUTER QUADRANT OF LEFT FEMALE BREAST (MULTI): ICD-10-CM

## 2024-07-23 LAB
RAD ONC MSQ ACTUAL FRACTIONS DELIVERED: 2
RAD ONC MSQ ACTUAL SESSION DELIVERED DOSE: 266 CGRAY
RAD ONC MSQ ACTUAL TOTAL DOSE: 532 CGRAY
RAD ONC MSQ ELAPSED DAYS: 1
RAD ONC MSQ LAST DATE: NORMAL
RAD ONC MSQ PRESCRIBED FRACTIONAL DOSE: 266 CGRAY
RAD ONC MSQ PRESCRIBED NUMBER OF FRACTIONS: 16
RAD ONC MSQ PRESCRIBED TECHNIQUE: NORMAL
RAD ONC MSQ PRESCRIBED TOTAL DOSE: 4256 CGRAY
RAD ONC MSQ PRESCRIPTION PATTERN COMMENT: NORMAL
RAD ONC MSQ START DATE: NORMAL
RAD ONC MSQ TREATMENT COURSE NUMBER: 1
RAD ONC MSQ TREATMENT SITE: NORMAL

## 2024-07-23 PROCEDURE — 77412 RADIATION TX DELIVERY LVL 3: CPT | Performed by: RADIOLOGY

## 2024-07-23 PROCEDURE — 77387 GUIDANCE FOR RADJ TX DLVR: CPT | Performed by: RADIOLOGY

## 2024-07-24 ENCOUNTER — HOSPITAL ENCOUNTER (OUTPATIENT)
Dept: RADIATION ONCOLOGY | Facility: CLINIC | Age: 54
Setting detail: RADIATION/ONCOLOGY SERIES
Discharge: HOME | End: 2024-07-24
Payer: COMMERCIAL

## 2024-07-24 DIAGNOSIS — C77.3 SECONDARY AND UNSPECIFIED MALIGNANT NEOPLASM OF AXILLA AND UPPER LIMB LYMPH NODES (MULTI): ICD-10-CM

## 2024-07-24 DIAGNOSIS — C50.412 MALIGNANT NEOPLASM OF UPPER-OUTER QUADRANT OF LEFT FEMALE BREAST (MULTI): ICD-10-CM

## 2024-07-24 DIAGNOSIS — Z51.0 ENCOUNTER FOR ANTINEOPLASTIC RADIATION THERAPY: ICD-10-CM

## 2024-07-24 LAB
RAD ONC MSQ ACTUAL FRACTIONS DELIVERED: 3
RAD ONC MSQ ACTUAL SESSION DELIVERED DOSE: 266 CGRAY
RAD ONC MSQ ACTUAL TOTAL DOSE: 798 CGRAY
RAD ONC MSQ ELAPSED DAYS: 2
RAD ONC MSQ LAST DATE: NORMAL
RAD ONC MSQ PRESCRIBED FRACTIONAL DOSE: 266 CGRAY
RAD ONC MSQ PRESCRIBED NUMBER OF FRACTIONS: 16
RAD ONC MSQ PRESCRIBED TECHNIQUE: NORMAL
RAD ONC MSQ PRESCRIBED TOTAL DOSE: 4256 CGRAY
RAD ONC MSQ PRESCRIPTION PATTERN COMMENT: NORMAL
RAD ONC MSQ START DATE: NORMAL
RAD ONC MSQ TREATMENT COURSE NUMBER: 1
RAD ONC MSQ TREATMENT SITE: NORMAL

## 2024-07-24 PROCEDURE — 77412 RADIATION TX DELIVERY LVL 3: CPT | Performed by: RADIOLOGY

## 2024-07-24 PROCEDURE — 77387 GUIDANCE FOR RADJ TX DLVR: CPT | Performed by: RADIOLOGY

## 2024-07-25 ENCOUNTER — APPOINTMENT (OUTPATIENT)
Dept: HEMATOLOGY/ONCOLOGY | Facility: CLINIC | Age: 54
End: 2024-07-25
Payer: COMMERCIAL

## 2024-07-25 ENCOUNTER — HOSPITAL ENCOUNTER (OUTPATIENT)
Dept: RADIATION ONCOLOGY | Facility: CLINIC | Age: 54
Setting detail: RADIATION/ONCOLOGY SERIES
Discharge: HOME | End: 2024-07-25
Payer: COMMERCIAL

## 2024-07-25 DIAGNOSIS — C77.3 SECONDARY AND UNSPECIFIED MALIGNANT NEOPLASM OF AXILLA AND UPPER LIMB LYMPH NODES (MULTI): ICD-10-CM

## 2024-07-25 DIAGNOSIS — C50.412 MALIGNANT NEOPLASM OF UPPER-OUTER QUADRANT OF LEFT FEMALE BREAST (MULTI): ICD-10-CM

## 2024-07-25 DIAGNOSIS — Z51.0 ENCOUNTER FOR ANTINEOPLASTIC RADIATION THERAPY: ICD-10-CM

## 2024-07-25 LAB
RAD ONC MSQ ACTUAL FRACTIONS DELIVERED: 4
RAD ONC MSQ ACTUAL SESSION DELIVERED DOSE: 266 CGRAY
RAD ONC MSQ ACTUAL TOTAL DOSE: 1064 CGRAY
RAD ONC MSQ ELAPSED DAYS: 3
RAD ONC MSQ LAST DATE: NORMAL
RAD ONC MSQ PRESCRIBED FRACTIONAL DOSE: 266 CGRAY
RAD ONC MSQ PRESCRIBED NUMBER OF FRACTIONS: 16
RAD ONC MSQ PRESCRIBED TECHNIQUE: NORMAL
RAD ONC MSQ PRESCRIBED TOTAL DOSE: 4256 CGRAY
RAD ONC MSQ PRESCRIPTION PATTERN COMMENT: NORMAL
RAD ONC MSQ START DATE: NORMAL
RAD ONC MSQ TREATMENT COURSE NUMBER: 1
RAD ONC MSQ TREATMENT SITE: NORMAL

## 2024-07-25 PROCEDURE — 77387 GUIDANCE FOR RADJ TX DLVR: CPT | Performed by: RADIOLOGY

## 2024-07-25 PROCEDURE — 77412 RADIATION TX DELIVERY LVL 3: CPT | Performed by: RADIOLOGY

## 2024-07-26 ENCOUNTER — RADIATION ONCOLOGY OTV (OUTPATIENT)
Dept: RADIATION ONCOLOGY | Facility: CLINIC | Age: 54
End: 2024-07-26
Payer: COMMERCIAL

## 2024-07-26 ENCOUNTER — HOSPITAL ENCOUNTER (OUTPATIENT)
Dept: RADIATION ONCOLOGY | Facility: CLINIC | Age: 54
Setting detail: RADIATION/ONCOLOGY SERIES
Discharge: HOME | End: 2024-07-26
Payer: COMMERCIAL

## 2024-07-26 VITALS
OXYGEN SATURATION: 100 % | RESPIRATION RATE: 18 BRPM | WEIGHT: 175.3 LBS | BODY MASS INDEX: 25.88 KG/M2 | TEMPERATURE: 97.5 F | DIASTOLIC BLOOD PRESSURE: 95 MMHG | HEART RATE: 73 BPM | SYSTOLIC BLOOD PRESSURE: 152 MMHG

## 2024-07-26 DIAGNOSIS — Z51.0 ENCOUNTER FOR ANTINEOPLASTIC RADIATION THERAPY: ICD-10-CM

## 2024-07-26 DIAGNOSIS — C50.412 MALIGNANT NEOPLASM OF UPPER-OUTER QUADRANT OF LEFT FEMALE BREAST (MULTI): ICD-10-CM

## 2024-07-26 DIAGNOSIS — C77.3 SECONDARY AND UNSPECIFIED MALIGNANT NEOPLASM OF AXILLA AND UPPER LIMB LYMPH NODES (MULTI): ICD-10-CM

## 2024-07-26 LAB
RAD ONC MSQ ACTUAL FRACTIONS DELIVERED: 5
RAD ONC MSQ ACTUAL SESSION DELIVERED DOSE: 266 CGRAY
RAD ONC MSQ ACTUAL TOTAL DOSE: 1330 CGRAY
RAD ONC MSQ ELAPSED DAYS: 4
RAD ONC MSQ LAST DATE: NORMAL
RAD ONC MSQ PRESCRIBED FRACTIONAL DOSE: 266 CGRAY
RAD ONC MSQ PRESCRIBED NUMBER OF FRACTIONS: 16
RAD ONC MSQ PRESCRIBED TECHNIQUE: NORMAL
RAD ONC MSQ PRESCRIBED TOTAL DOSE: 4256 CGRAY
RAD ONC MSQ PRESCRIPTION PATTERN COMMENT: NORMAL
RAD ONC MSQ START DATE: NORMAL
RAD ONC MSQ TREATMENT COURSE NUMBER: 1
RAD ONC MSQ TREATMENT SITE: NORMAL

## 2024-07-26 PROCEDURE — 77412 RADIATION TX DELIVERY LVL 3: CPT | Performed by: RADIOLOGY

## 2024-07-26 PROCEDURE — 77387 GUIDANCE FOR RADJ TX DLVR: CPT | Performed by: RADIOLOGY

## 2024-07-26 ASSESSMENT — PAIN SCALES - GENERAL: PAINLEVEL: 0-NO PAIN

## 2024-07-26 NOTE — PROGRESS NOTES
Radiation Oncology On Treatment Visit    Patient Name:  Bisi Reynaga  MRN:  [id number]  :  1970    Referring Provider: No ref. provider found  Primary Care Provider: Tre Raymond DO  Care Team: Patient Care Team:  Tre Raymond DO as PCP - General  Tre Raymond DO as PCP - Maureen FREEMAN PCP  Silvia Bhatia MD as Consulting Physician (Hematology and Oncology)    Date of Service: 2024     Diagnosis:   Specialty Problems          Radiation Oncology Problems    Malignant neoplasm of upper-outer quadrant of left female breast (Multi)        Malignant neoplasm of upper-outer quadrant of left breast in female, estrogen receptor positive (Multi)         Treatment Summary:  Other Treatments    Treatment Period Technique Fraction Dose Fractions Total Dose   Course 1 2024-2024  (days elapsed: 4)         Lt breast/low ax 2024-2024 3D 266 / 266 cGy  1330 / 4,256 cGy     SUBJECTIVE: No complaints.        OBJECTIVE:   Vital Signs:  BP (!) 152/95 (BP Location: Right arm, Patient Position: Sitting, BP Cuff Size: Adult)   Pulse 73   Temp 36.4 °C (97.5 °F) (Temporal)   Resp 18   Wt 79.5 kg (175 lb 4.8 oz)   SpO2 100%   BMI 25.88 kg/m²    Pain Scale: The patient's current pain level was assessed.  They report currently having a pain of 0 out of 10.    Other Pertinent Findings: No skin changes.    Toxicity Assessment          2024    12:37   Toxicity Assessment   Adverse Events Reviewed (WDL) No (Exceptions to WDL)   Treatment Site Breast   Anorexia Grade 0   Dermatitis Radiation Grade 0       given skin products with instructions of use.  skin intact.   Fatigue Grade 0   Breast Pain Grade 0   Edema Limbs Grade 0   Lymphedema Grade 0        Assessment / Plan:  The patient is tolerating radiation therapy as anticipated.  Continue per current treatment plan.   Chart and films reviewed and approved.

## 2024-07-29 ENCOUNTER — HOSPITAL ENCOUNTER (OUTPATIENT)
Dept: RADIATION ONCOLOGY | Facility: CLINIC | Age: 54
Setting detail: RADIATION/ONCOLOGY SERIES
Discharge: HOME | End: 2024-07-29
Payer: COMMERCIAL

## 2024-07-29 DIAGNOSIS — Z51.0 ENCOUNTER FOR ANTINEOPLASTIC RADIATION THERAPY: ICD-10-CM

## 2024-07-29 DIAGNOSIS — C77.3 SECONDARY AND UNSPECIFIED MALIGNANT NEOPLASM OF AXILLA AND UPPER LIMB LYMPH NODES (MULTI): ICD-10-CM

## 2024-07-29 DIAGNOSIS — C50.412 MALIGNANT NEOPLASM OF UPPER-OUTER QUADRANT OF LEFT FEMALE BREAST (MULTI): ICD-10-CM

## 2024-07-29 LAB
RAD ONC MSQ ACTUAL FRACTIONS DELIVERED: 6
RAD ONC MSQ ACTUAL SESSION DELIVERED DOSE: 266 CGRAY
RAD ONC MSQ ACTUAL TOTAL DOSE: 1596 CGRAY
RAD ONC MSQ ELAPSED DAYS: 7
RAD ONC MSQ LAST DATE: NORMAL
RAD ONC MSQ PRESCRIBED FRACTIONAL DOSE: 266 CGRAY
RAD ONC MSQ PRESCRIBED NUMBER OF FRACTIONS: 16
RAD ONC MSQ PRESCRIBED TECHNIQUE: NORMAL
RAD ONC MSQ PRESCRIBED TOTAL DOSE: 4256 CGRAY
RAD ONC MSQ PRESCRIPTION PATTERN COMMENT: NORMAL
RAD ONC MSQ START DATE: NORMAL
RAD ONC MSQ TREATMENT COURSE NUMBER: 1
RAD ONC MSQ TREATMENT SITE: NORMAL

## 2024-07-29 PROCEDURE — 77387 GUIDANCE FOR RADJ TX DLVR: CPT | Performed by: RADIOLOGY

## 2024-07-29 PROCEDURE — 77412 RADIATION TX DELIVERY LVL 3: CPT | Performed by: RADIOLOGY

## 2024-07-29 PROCEDURE — 77336 RADIATION PHYSICS CONSULT: CPT | Performed by: RADIOLOGY

## 2024-07-30 ENCOUNTER — HOSPITAL ENCOUNTER (OUTPATIENT)
Dept: RADIATION ONCOLOGY | Facility: CLINIC | Age: 54
Setting detail: RADIATION/ONCOLOGY SERIES
Discharge: HOME | End: 2024-07-30
Payer: COMMERCIAL

## 2024-07-30 DIAGNOSIS — C77.3 SECONDARY AND UNSPECIFIED MALIGNANT NEOPLASM OF AXILLA AND UPPER LIMB LYMPH NODES (MULTI): ICD-10-CM

## 2024-07-30 DIAGNOSIS — C50.412 MALIGNANT NEOPLASM OF UPPER-OUTER QUADRANT OF LEFT FEMALE BREAST (MULTI): ICD-10-CM

## 2024-07-30 DIAGNOSIS — Z51.0 ENCOUNTER FOR ANTINEOPLASTIC RADIATION THERAPY: ICD-10-CM

## 2024-07-30 LAB
RAD ONC MSQ ACTUAL FRACTIONS DELIVERED: 7
RAD ONC MSQ ACTUAL SESSION DELIVERED DOSE: 266 CGRAY
RAD ONC MSQ ACTUAL TOTAL DOSE: 1862 CGRAY
RAD ONC MSQ ELAPSED DAYS: 8
RAD ONC MSQ LAST DATE: NORMAL
RAD ONC MSQ PRESCRIBED FRACTIONAL DOSE: 266 CGRAY
RAD ONC MSQ PRESCRIBED NUMBER OF FRACTIONS: 16
RAD ONC MSQ PRESCRIBED TECHNIQUE: NORMAL
RAD ONC MSQ PRESCRIBED TOTAL DOSE: 4256 CGRAY
RAD ONC MSQ PRESCRIPTION PATTERN COMMENT: NORMAL
RAD ONC MSQ START DATE: NORMAL
RAD ONC MSQ TREATMENT COURSE NUMBER: 1
RAD ONC MSQ TREATMENT SITE: NORMAL

## 2024-07-30 PROCEDURE — 77412 RADIATION TX DELIVERY LVL 3: CPT | Performed by: RADIOLOGY

## 2024-07-30 PROCEDURE — 77387 GUIDANCE FOR RADJ TX DLVR: CPT | Performed by: RADIOLOGY

## 2024-07-31 ENCOUNTER — HOSPITAL ENCOUNTER (OUTPATIENT)
Dept: RADIATION ONCOLOGY | Facility: CLINIC | Age: 54
Setting detail: RADIATION/ONCOLOGY SERIES
Discharge: HOME | End: 2024-07-31
Payer: COMMERCIAL

## 2024-07-31 DIAGNOSIS — C77.3 SECONDARY AND UNSPECIFIED MALIGNANT NEOPLASM OF AXILLA AND UPPER LIMB LYMPH NODES (MULTI): ICD-10-CM

## 2024-07-31 DIAGNOSIS — Z51.0 ENCOUNTER FOR ANTINEOPLASTIC RADIATION THERAPY: ICD-10-CM

## 2024-07-31 DIAGNOSIS — C50.412 MALIGNANT NEOPLASM OF UPPER-OUTER QUADRANT OF LEFT FEMALE BREAST (MULTI): ICD-10-CM

## 2024-07-31 LAB
RAD ONC MSQ ACTUAL FRACTIONS DELIVERED: 8
RAD ONC MSQ ACTUAL SESSION DELIVERED DOSE: 266 CGRAY
RAD ONC MSQ ACTUAL TOTAL DOSE: 2128 CGRAY
RAD ONC MSQ ELAPSED DAYS: 9
RAD ONC MSQ LAST DATE: NORMAL
RAD ONC MSQ PRESCRIBED FRACTIONAL DOSE: 266 CGRAY
RAD ONC MSQ PRESCRIBED NUMBER OF FRACTIONS: 16
RAD ONC MSQ PRESCRIBED TECHNIQUE: NORMAL
RAD ONC MSQ PRESCRIBED TOTAL DOSE: 4256 CGRAY
RAD ONC MSQ PRESCRIPTION PATTERN COMMENT: NORMAL
RAD ONC MSQ START DATE: NORMAL
RAD ONC MSQ TREATMENT COURSE NUMBER: 1
RAD ONC MSQ TREATMENT SITE: NORMAL

## 2024-07-31 PROCEDURE — 77412 RADIATION TX DELIVERY LVL 3: CPT | Performed by: RADIOLOGY

## 2024-07-31 PROCEDURE — 77387 GUIDANCE FOR RADJ TX DLVR: CPT | Performed by: RADIOLOGY

## 2024-08-01 ENCOUNTER — HOSPITAL ENCOUNTER (OUTPATIENT)
Dept: RADIATION ONCOLOGY | Facility: CLINIC | Age: 54
Setting detail: RADIATION/ONCOLOGY SERIES
Discharge: HOME | End: 2024-08-01
Payer: COMMERCIAL

## 2024-08-01 DIAGNOSIS — C50.412 MALIGNANT NEOPLASM OF UPPER-OUTER QUADRANT OF LEFT FEMALE BREAST (MULTI): ICD-10-CM

## 2024-08-01 DIAGNOSIS — C77.3 SECONDARY AND UNSPECIFIED MALIGNANT NEOPLASM OF AXILLA AND UPPER LIMB LYMPH NODES (MULTI): ICD-10-CM

## 2024-08-01 DIAGNOSIS — Z51.0 ENCOUNTER FOR ANTINEOPLASTIC RADIATION THERAPY: ICD-10-CM

## 2024-08-01 LAB
RAD ONC MSQ ACTUAL FRACTIONS DELIVERED: 9
RAD ONC MSQ ACTUAL SESSION DELIVERED DOSE: 266 CGRAY
RAD ONC MSQ ACTUAL TOTAL DOSE: 2394 CGRAY
RAD ONC MSQ ELAPSED DAYS: 10
RAD ONC MSQ LAST DATE: NORMAL
RAD ONC MSQ PRESCRIBED FRACTIONAL DOSE: 266 CGRAY
RAD ONC MSQ PRESCRIBED NUMBER OF FRACTIONS: 16
RAD ONC MSQ PRESCRIBED TECHNIQUE: NORMAL
RAD ONC MSQ PRESCRIBED TOTAL DOSE: 4256 CGRAY
RAD ONC MSQ PRESCRIPTION PATTERN COMMENT: NORMAL
RAD ONC MSQ START DATE: NORMAL
RAD ONC MSQ TREATMENT COURSE NUMBER: 1
RAD ONC MSQ TREATMENT SITE: NORMAL

## 2024-08-01 PROCEDURE — 77412 RADIATION TX DELIVERY LVL 3: CPT | Performed by: RADIOLOGY

## 2024-08-01 PROCEDURE — 77387 GUIDANCE FOR RADJ TX DLVR: CPT | Performed by: RADIOLOGY

## 2024-08-02 ENCOUNTER — HOSPITAL ENCOUNTER (OUTPATIENT)
Dept: RADIATION ONCOLOGY | Facility: CLINIC | Age: 54
Setting detail: RADIATION/ONCOLOGY SERIES
Discharge: HOME | End: 2024-08-02
Payer: COMMERCIAL

## 2024-08-02 ENCOUNTER — RADIATION ONCOLOGY OTV (OUTPATIENT)
Dept: RADIATION ONCOLOGY | Facility: CLINIC | Age: 54
End: 2024-08-02
Payer: COMMERCIAL

## 2024-08-02 VITALS
RESPIRATION RATE: 16 BRPM | TEMPERATURE: 96.6 F | BODY MASS INDEX: 25.57 KG/M2 | HEART RATE: 76 BPM | SYSTOLIC BLOOD PRESSURE: 126 MMHG | WEIGHT: 173.2 LBS | DIASTOLIC BLOOD PRESSURE: 83 MMHG | OXYGEN SATURATION: 99 %

## 2024-08-02 DIAGNOSIS — C77.3 SECONDARY AND UNSPECIFIED MALIGNANT NEOPLASM OF AXILLA AND UPPER LIMB LYMPH NODES (MULTI): ICD-10-CM

## 2024-08-02 DIAGNOSIS — C50.412 MALIGNANT NEOPLASM OF UPPER-OUTER QUADRANT OF LEFT FEMALE BREAST (MULTI): ICD-10-CM

## 2024-08-02 DIAGNOSIS — Z51.0 ENCOUNTER FOR ANTINEOPLASTIC RADIATION THERAPY: ICD-10-CM

## 2024-08-02 LAB
RAD ONC MSQ ACTUAL FRACTIONS DELIVERED: 10
RAD ONC MSQ ACTUAL SESSION DELIVERED DOSE: 266 CGRAY
RAD ONC MSQ ACTUAL TOTAL DOSE: 2660 CGRAY
RAD ONC MSQ ELAPSED DAYS: 11
RAD ONC MSQ LAST DATE: NORMAL
RAD ONC MSQ PRESCRIBED FRACTIONAL DOSE: 266 CGRAY
RAD ONC MSQ PRESCRIBED NUMBER OF FRACTIONS: 16
RAD ONC MSQ PRESCRIBED TECHNIQUE: NORMAL
RAD ONC MSQ PRESCRIBED TOTAL DOSE: 4256 CGRAY
RAD ONC MSQ PRESCRIPTION PATTERN COMMENT: NORMAL
RAD ONC MSQ START DATE: NORMAL
RAD ONC MSQ TREATMENT COURSE NUMBER: 1
RAD ONC MSQ TREATMENT SITE: NORMAL

## 2024-08-02 PROCEDURE — 77387 GUIDANCE FOR RADJ TX DLVR: CPT | Performed by: RADIOLOGY

## 2024-08-02 PROCEDURE — 77412 RADIATION TX DELIVERY LVL 3: CPT | Performed by: RADIOLOGY

## 2024-08-02 ASSESSMENT — PAIN SCALES - GENERAL: PAINLEVEL: 0-NO PAIN

## 2024-08-02 NOTE — PROGRESS NOTES
Radiation Oncology On Treatment Visit    Patient Name:  Bisi Reynaga  MRN:  [id number]  :  1970    Referring Provider: No ref. provider found  Primary Care Provider: Tre Raymond DO  Care Team: Patient Care Team:  Tre Raymond DO as PCP - General  Tre Raymond DO as PCP - Maureen FREEMAN PCP  Silvia Bhatia MD as Consulting Physician (Hematology and Oncology)    Date of Service: 2024     Diagnosis:   Specialty Problems          Radiation Oncology Problems    Malignant neoplasm of upper-outer quadrant of left female breast (Multi)        Malignant neoplasm of upper-outer quadrant of left breast in female, estrogen receptor positive (Multi)         Treatment Summary:  Other Treatments    Treatment Period Technique Fraction Dose Fractions Total Dose   Course 1 2024-2024  (days elapsed: 11)         Lt breast/low ax 2024-2024 3D 266 / 266 cGy 10 / 16 2660 / 4,256 cGy     SUBJECTIVE: No complaints.        OBJECTIVE:   Vital Signs:  /83 (BP Location: Right arm, Patient Position: Sitting, BP Cuff Size: Adult)   Pulse 76   Temp 35.9 °C (96.6 °F) (Temporal)   Resp 16   Wt 78.6 kg (173 lb 3.2 oz)   SpO2 99%   BMI 25.57 kg/m²    Pain Scale: The patient's current pain level was assessed.  They report currently having a pain of 0 out of 10.    Other Pertinent Findings: Minimal erythema, skin intact.    Toxicity Assessment          2024    12:37 2024    09:29   Toxicity Assessment   Adverse Events Reviewed (WDL) No (Exceptions to WDL) No (Exceptions to WDL)   Treatment Site Breast Breast   Anorexia Grade 0 Grade 0   Dermatitis Radiation Grade 0       given skin products with instructions of use.  skin intact. Grade 1       faint erythema; skin intact.  using skin products daily.   Fatigue Grade 0 Grade 0   Breast Pain Grade 0 Grade 0       mild heaviness; soreness of left breast.   Edema Limbs Grade 0    Lymphedema Grade 0 Grade 0        Assessment / Plan:  The  patient is tolerating radiation therapy as anticipated.  Continue per current treatment plan.   Chart and films reviewed and approved.

## 2024-08-05 ENCOUNTER — HOSPITAL ENCOUNTER (OUTPATIENT)
Dept: RADIATION ONCOLOGY | Facility: CLINIC | Age: 54
Setting detail: RADIATION/ONCOLOGY SERIES
Discharge: HOME | End: 2024-08-05
Payer: COMMERCIAL

## 2024-08-05 DIAGNOSIS — C77.3 SECONDARY AND UNSPECIFIED MALIGNANT NEOPLASM OF AXILLA AND UPPER LIMB LYMPH NODES (MULTI): ICD-10-CM

## 2024-08-05 DIAGNOSIS — C50.412 MALIGNANT NEOPLASM OF UPPER-OUTER QUADRANT OF LEFT FEMALE BREAST (MULTI): ICD-10-CM

## 2024-08-05 DIAGNOSIS — Z51.0 ENCOUNTER FOR ANTINEOPLASTIC RADIATION THERAPY: ICD-10-CM

## 2024-08-05 LAB
RAD ONC MSQ ACTUAL FRACTIONS DELIVERED: 11
RAD ONC MSQ ACTUAL SESSION DELIVERED DOSE: 266 CGRAY
RAD ONC MSQ ACTUAL TOTAL DOSE: 2926 CGRAY
RAD ONC MSQ ELAPSED DAYS: 14
RAD ONC MSQ LAST DATE: NORMAL
RAD ONC MSQ PRESCRIBED FRACTIONAL DOSE: 266 CGRAY
RAD ONC MSQ PRESCRIBED NUMBER OF FRACTIONS: 16
RAD ONC MSQ PRESCRIBED TECHNIQUE: NORMAL
RAD ONC MSQ PRESCRIBED TOTAL DOSE: 4256 CGRAY
RAD ONC MSQ PRESCRIPTION PATTERN COMMENT: NORMAL
RAD ONC MSQ START DATE: NORMAL
RAD ONC MSQ TREATMENT COURSE NUMBER: 1
RAD ONC MSQ TREATMENT SITE: NORMAL

## 2024-08-05 PROCEDURE — 77387 GUIDANCE FOR RADJ TX DLVR: CPT | Performed by: RADIOLOGY

## 2024-08-05 PROCEDURE — 77336 RADIATION PHYSICS CONSULT: CPT | Performed by: RADIOLOGY

## 2024-08-05 PROCEDURE — 77412 RADIATION TX DELIVERY LVL 3: CPT | Performed by: RADIOLOGY

## 2024-08-06 ENCOUNTER — HOSPITAL ENCOUNTER (OUTPATIENT)
Dept: RADIATION ONCOLOGY | Facility: CLINIC | Age: 54
Setting detail: RADIATION/ONCOLOGY SERIES
Discharge: HOME | End: 2024-08-06
Payer: COMMERCIAL

## 2024-08-06 DIAGNOSIS — Z51.0 ENCOUNTER FOR ANTINEOPLASTIC RADIATION THERAPY: ICD-10-CM

## 2024-08-06 DIAGNOSIS — C50.412 MALIGNANT NEOPLASM OF UPPER-OUTER QUADRANT OF LEFT FEMALE BREAST (MULTI): ICD-10-CM

## 2024-08-06 DIAGNOSIS — C77.3 SECONDARY AND UNSPECIFIED MALIGNANT NEOPLASM OF AXILLA AND UPPER LIMB LYMPH NODES (MULTI): ICD-10-CM

## 2024-08-06 LAB
RAD ONC MSQ ACTUAL FRACTIONS DELIVERED: 12
RAD ONC MSQ ACTUAL SESSION DELIVERED DOSE: 266 CGRAY
RAD ONC MSQ ACTUAL TOTAL DOSE: 3192 CGRAY
RAD ONC MSQ ELAPSED DAYS: 15
RAD ONC MSQ LAST DATE: NORMAL
RAD ONC MSQ PRESCRIBED FRACTIONAL DOSE: 266 CGRAY
RAD ONC MSQ PRESCRIBED NUMBER OF FRACTIONS: 16
RAD ONC MSQ PRESCRIBED TECHNIQUE: NORMAL
RAD ONC MSQ PRESCRIBED TOTAL DOSE: 4256 CGRAY
RAD ONC MSQ PRESCRIPTION PATTERN COMMENT: NORMAL
RAD ONC MSQ START DATE: NORMAL
RAD ONC MSQ TREATMENT COURSE NUMBER: 1
RAD ONC MSQ TREATMENT SITE: NORMAL

## 2024-08-06 PROCEDURE — 77387 GUIDANCE FOR RADJ TX DLVR: CPT | Performed by: RADIOLOGY

## 2024-08-06 PROCEDURE — 77412 RADIATION TX DELIVERY LVL 3: CPT | Performed by: RADIOLOGY

## 2024-08-07 ENCOUNTER — HOSPITAL ENCOUNTER (OUTPATIENT)
Dept: RADIATION ONCOLOGY | Facility: CLINIC | Age: 54
Setting detail: RADIATION/ONCOLOGY SERIES
Discharge: HOME | End: 2024-08-07
Payer: COMMERCIAL

## 2024-08-07 DIAGNOSIS — C77.3 SECONDARY AND UNSPECIFIED MALIGNANT NEOPLASM OF AXILLA AND UPPER LIMB LYMPH NODES (MULTI): ICD-10-CM

## 2024-08-07 DIAGNOSIS — C50.412 MALIGNANT NEOPLASM OF UPPER-OUTER QUADRANT OF LEFT FEMALE BREAST (MULTI): ICD-10-CM

## 2024-08-07 DIAGNOSIS — Z51.0 ENCOUNTER FOR ANTINEOPLASTIC RADIATION THERAPY: ICD-10-CM

## 2024-08-07 LAB
RAD ONC MSQ ACTUAL FRACTIONS DELIVERED: 13
RAD ONC MSQ ACTUAL SESSION DELIVERED DOSE: 266 CGRAY
RAD ONC MSQ ACTUAL TOTAL DOSE: 3458 CGRAY
RAD ONC MSQ ELAPSED DAYS: 16
RAD ONC MSQ LAST DATE: NORMAL
RAD ONC MSQ PRESCRIBED FRACTIONAL DOSE: 266 CGRAY
RAD ONC MSQ PRESCRIBED NUMBER OF FRACTIONS: 16
RAD ONC MSQ PRESCRIBED TECHNIQUE: NORMAL
RAD ONC MSQ PRESCRIBED TOTAL DOSE: 4256 CGRAY
RAD ONC MSQ PRESCRIPTION PATTERN COMMENT: NORMAL
RAD ONC MSQ START DATE: NORMAL
RAD ONC MSQ TREATMENT COURSE NUMBER: 1
RAD ONC MSQ TREATMENT SITE: NORMAL

## 2024-08-07 PROCEDURE — 77387 GUIDANCE FOR RADJ TX DLVR: CPT | Performed by: RADIOLOGY

## 2024-08-07 PROCEDURE — 77412 RADIATION TX DELIVERY LVL 3: CPT | Performed by: RADIOLOGY

## 2024-08-08 ENCOUNTER — HOSPITAL ENCOUNTER (OUTPATIENT)
Dept: RADIATION ONCOLOGY | Facility: CLINIC | Age: 54
Setting detail: RADIATION/ONCOLOGY SERIES
Discharge: HOME | End: 2024-08-08
Payer: COMMERCIAL

## 2024-08-08 PROCEDURE — 77387 GUIDANCE FOR RADJ TX DLVR: CPT | Performed by: RADIOLOGY

## 2024-08-08 PROCEDURE — 77412 RADIATION TX DELIVERY LVL 3: CPT | Performed by: RADIOLOGY

## 2024-08-09 ENCOUNTER — RADIATION ONCOLOGY OTV (OUTPATIENT)
Dept: RADIATION ONCOLOGY | Facility: CLINIC | Age: 54
End: 2024-08-09
Payer: COMMERCIAL

## 2024-08-09 VITALS
HEART RATE: 61 BPM | WEIGHT: 175.8 LBS | SYSTOLIC BLOOD PRESSURE: 135 MMHG | DIASTOLIC BLOOD PRESSURE: 90 MMHG | BODY MASS INDEX: 25.95 KG/M2 | TEMPERATURE: 98 F | RESPIRATION RATE: 17 BRPM | OXYGEN SATURATION: 98 %

## 2024-08-09 ASSESSMENT — PAIN SCALES - GENERAL: PAINLEVEL: 0-NO PAIN

## 2024-08-09 NOTE — PROGRESS NOTES
Radiation Oncology On Treatment Visit    Patient Name:  Bisi Reynaga  MRN:  [id number]  :  1970    Referring Provider: No ref. provider found  Primary Care Provider: Tre Raymond DO  Care Team: Patient Care Team:  Tre Raymond DO as PCP - General  Tre Raymond DO as PCP - Maureen FREEMAN PCP  Silvia Bhatia MD as Consulting Physician (Hematology and Oncology)    Date of Service: 2024     Diagnosis:   Specialty Problems          Radiation Oncology Problems    Malignant neoplasm of upper-outer quadrant of left female breast (Multi)        Malignant neoplasm of upper-outer quadrant of left breast in female, estrogen receptor positive (Multi)         Treatment Summary:  Radiation Treatments       Active   Lt breast/low ax (Started on 2024)   Most recent fraction: 266 cGy given on 2024   Total given: 3,724 cGy / 4,256 cGy  (14 of 16 fractions)   Elapsed Days: 16   Technique: 3D           Completed   No historical radiation treatments to show.                   SUBJECTIVE: No complaints.      OBJECTIVE:   Vital Signs:  /90 (BP Location: Right arm, Patient Position: Sitting, BP Cuff Size: Adult)   Pulse 61   Temp 36.7 °C (98 °F) (Core)   Resp 17   Wt 79.7 kg (175 lb 12.8 oz)   SpO2 98%   BMI 25.95 kg/m²    Pain Scale: The patient's current pain level was assessed.  They report currently having a pain of 0 out of 10.    Other Pertinent Findings: Mild erythema, skin intact.    Toxicity Assessment          2024    12:37 2024    09:29 2024    12:38   Toxicity Assessment   Adverse Events Reviewed (WDL) No (Exceptions to WDL) No (Exceptions to WDL) No (Exceptions to WDL)   Treatment Site Breast Breast Breast   Anorexia Grade 0 Grade 0 Grade 0   Dermatitis Radiation Grade 0       given skin products with instructions of use.  skin intact. Grade 1       faint erythema; skin intact.  using skin products daily. Grade 1       mild erythema; skin intact.  using skin  products as instructed.   Fatigue Grade 0 Grade 0 Grade 1   Breast Pain Grade 0 Grade 0       mild heaviness; soreness of left breast. Grade 0   Edema Limbs Grade 0  Grade 0   Lymphedema Grade 0 Grade 0 Grade 0        Assessment / Plan:  The patient is tolerating radiation therapy as anticipated.  Continue per current treatment plan.   Chart and films reviewed and approved.

## 2024-08-12 ENCOUNTER — HOSPITAL ENCOUNTER (OUTPATIENT)
Dept: RADIATION ONCOLOGY | Facility: CLINIC | Age: 54
Setting detail: RADIATION/ONCOLOGY SERIES
Discharge: HOME | End: 2024-08-12
Payer: COMMERCIAL

## 2024-08-12 DIAGNOSIS — Z51.0 ENCOUNTER FOR ANTINEOPLASTIC RADIATION THERAPY: ICD-10-CM

## 2024-08-12 DIAGNOSIS — C77.3 SECONDARY AND UNSPECIFIED MALIGNANT NEOPLASM OF AXILLA AND UPPER LIMB LYMPH NODES (MULTI): ICD-10-CM

## 2024-08-12 DIAGNOSIS — C50.412 MALIGNANT NEOPLASM OF UPPER-OUTER QUADRANT OF LEFT FEMALE BREAST (MULTI): ICD-10-CM

## 2024-08-12 LAB
RAD ONC MSQ ACTUAL FRACTIONS DELIVERED: 15
RAD ONC MSQ ACTUAL SESSION DELIVERED DOSE: 266 CGRAY
RAD ONC MSQ ACTUAL TOTAL DOSE: 3990 CGRAY
RAD ONC MSQ ELAPSED DAYS: 21
RAD ONC MSQ LAST DATE: NORMAL
RAD ONC MSQ PRESCRIBED FRACTIONAL DOSE: 266 CGRAY
RAD ONC MSQ PRESCRIBED NUMBER OF FRACTIONS: 16
RAD ONC MSQ PRESCRIBED TECHNIQUE: NORMAL
RAD ONC MSQ PRESCRIBED TOTAL DOSE: 4256 CGRAY
RAD ONC MSQ PRESCRIPTION PATTERN COMMENT: NORMAL
RAD ONC MSQ START DATE: NORMAL
RAD ONC MSQ TREATMENT COURSE NUMBER: 1
RAD ONC MSQ TREATMENT SITE: NORMAL

## 2024-08-12 PROCEDURE — 77412 RADIATION TX DELIVERY LVL 3: CPT | Performed by: RADIOLOGY

## 2024-08-12 PROCEDURE — 77387 GUIDANCE FOR RADJ TX DLVR: CPT | Performed by: RADIOLOGY

## 2024-08-13 ENCOUNTER — OFFICE VISIT (OUTPATIENT)
Dept: HEMATOLOGY/ONCOLOGY | Facility: CLINIC | Age: 54
End: 2024-08-13
Payer: COMMERCIAL

## 2024-08-13 ENCOUNTER — ONCOLOGY MEDICATION OUTREACH (OUTPATIENT)
Dept: HEMATOLOGY/ONCOLOGY | Facility: CLINIC | Age: 54
End: 2024-08-13

## 2024-08-13 ENCOUNTER — HOSPITAL ENCOUNTER (OUTPATIENT)
Dept: RADIATION ONCOLOGY | Facility: CLINIC | Age: 54
Setting detail: RADIATION/ONCOLOGY SERIES
Discharge: HOME | End: 2024-08-13
Payer: COMMERCIAL

## 2024-08-13 ENCOUNTER — SPECIALTY PHARMACY (OUTPATIENT)
Dept: PHARMACY | Facility: CLINIC | Age: 54
End: 2024-08-13

## 2024-08-13 VITALS
HEART RATE: 74 BPM | SYSTOLIC BLOOD PRESSURE: 124 MMHG | DIASTOLIC BLOOD PRESSURE: 85 MMHG | BODY MASS INDEX: 25.59 KG/M2 | WEIGHT: 173.4 LBS | TEMPERATURE: 98.1 F

## 2024-08-13 DIAGNOSIS — C50.412 MALIGNANT NEOPLASM OF UPPER-OUTER QUADRANT OF LEFT BREAST IN FEMALE, ESTROGEN RECEPTOR POSITIVE (MULTI): ICD-10-CM

## 2024-08-13 DIAGNOSIS — C50.412 MALIGNANT NEOPLASM OF UPPER-OUTER QUADRANT OF LEFT FEMALE BREAST (MULTI): ICD-10-CM

## 2024-08-13 DIAGNOSIS — C77.3 SECONDARY AND UNSPECIFIED MALIGNANT NEOPLASM OF AXILLA AND UPPER LIMB LYMPH NODES (MULTI): ICD-10-CM

## 2024-08-13 DIAGNOSIS — Z51.0 ENCOUNTER FOR ANTINEOPLASTIC RADIATION THERAPY: ICD-10-CM

## 2024-08-13 DIAGNOSIS — Z17.0 MALIGNANT NEOPLASM OF UPPER-OUTER QUADRANT OF LEFT BREAST IN FEMALE, ESTROGEN RECEPTOR POSITIVE (MULTI): ICD-10-CM

## 2024-08-13 LAB
RAD ONC MSQ ACTUAL FRACTIONS DELIVERED: 16
RAD ONC MSQ ACTUAL SESSION DELIVERED DOSE: 266 CGRAY
RAD ONC MSQ ACTUAL TOTAL DOSE: 4256 CGRAY
RAD ONC MSQ ELAPSED DAYS: 22
RAD ONC MSQ LAST DATE: NORMAL
RAD ONC MSQ PRESCRIBED FRACTIONAL DOSE: 266 CGRAY
RAD ONC MSQ PRESCRIBED NUMBER OF FRACTIONS: 16
RAD ONC MSQ PRESCRIBED TECHNIQUE: NORMAL
RAD ONC MSQ PRESCRIBED TOTAL DOSE: 4256 CGRAY
RAD ONC MSQ PRESCRIPTION PATTERN COMMENT: NORMAL
RAD ONC MSQ START DATE: NORMAL
RAD ONC MSQ TREATMENT COURSE NUMBER: 1
RAD ONC MSQ TREATMENT SITE: NORMAL

## 2024-08-13 PROCEDURE — 1036F TOBACCO NON-USER: CPT | Performed by: STUDENT IN AN ORGANIZED HEALTH CARE EDUCATION/TRAINING PROGRAM

## 2024-08-13 PROCEDURE — 99215 OFFICE O/P EST HI 40 MIN: CPT | Performed by: STUDENT IN AN ORGANIZED HEALTH CARE EDUCATION/TRAINING PROGRAM

## 2024-08-13 PROCEDURE — 3074F SYST BP LT 130 MM HG: CPT | Performed by: STUDENT IN AN ORGANIZED HEALTH CARE EDUCATION/TRAINING PROGRAM

## 2024-08-13 PROCEDURE — 3079F DIAST BP 80-89 MM HG: CPT | Performed by: STUDENT IN AN ORGANIZED HEALTH CARE EDUCATION/TRAINING PROGRAM

## 2024-08-13 PROCEDURE — 77387 GUIDANCE FOR RADJ TX DLVR: CPT | Performed by: RADIOLOGY

## 2024-08-13 PROCEDURE — 77412 RADIATION TX DELIVERY LVL 3: CPT | Performed by: RADIOLOGY

## 2024-08-13 RX ORDER — ANASTROZOLE 1 MG/1
1 TABLET ORAL DAILY
Qty: 90 TABLET | Refills: 1 | Status: SHIPPED | OUTPATIENT
Start: 2024-08-13 | End: 2025-08-13

## 2024-08-13 ASSESSMENT — PAIN SCALES - GENERAL: PAINLEVEL: 0-NO PAIN

## 2024-08-13 NOTE — PROGRESS NOTES
Reviewed Vernenio and anastrozole, dose, frequency, administration, treatment cycle, duration of therapy, and missed doses. Counseled on potential side effects including but not limited to chemotherapy side effects: neutropenia, infection risk, anemia, fatigue, weakness, low energy, thrombocytopenia, bleeding/bruising, n/v, diarrhea, and skin rash. Discussed techniques to mitigate severity of side effects such as blood count checks, temperature checks, blood product transfusions, electrolyte monitoring, antiemetic use, loperamide use with max dose of 8 tabs per 24 hours, and staying hydrated if having diarrhea.  Provided medication/regimen handout. All questions answered and contact information was given to patient.

## 2024-08-13 NOTE — PATIENT INSTRUCTIONS
Call us when you are ready to start anastrozole and verzenio.   Review handout on Zometa.  Follow up with Dr. Bhatia on 10/17/24  Please call [phone] (option 5, then option 2) with questions or concerns.

## 2024-08-13 NOTE — PROGRESS NOTES
Breast Medical Oncology Clinic  Location: [facility]     Visit Type: Follow Up Patient Visit     Oncologic History:     12/21/2023: Screening mammogram: New somewhat spiculated central lateral superior left breast mass and small nodule more anteriorly in the slight lateral left breast.     12/26/2023: Diagnostic breast imaging: Asymmetry in the medial right breast persists on additional imaging.  This is stable since August 2005.  In the left breast there is an irregular high density mass with associated architectural distortion.  An oval circumscribed equal density mass in the central lateral left breast at middle depth persists.  On ultrasound there is no suspicious finding to correspond to the right breast asymmetry.  In the left breast an irregular hypoechoic mass is seen at the 1 o'clock position 8 cm from the nipple measuring 1.8 x 1.2 x 2.0 cm.  At 4:00 3 cm from the nipple there is a cyst.  Targeted ultrasound of the left axilla demonstrates 1 abnormal appearing left axillary lymph node and 4 normal-appearing lymph nodes.     12/29/2023: Left breast mass ultrasound-guided core needle biopsy: Invasive ductal carcinoma, grade 3.  ER positive (greater than 95%) RI positive (10%) HER2 equal focal, not amplified by dual-stephanie.     12/29/2023: Left axillary lymph node biopsy shows metastatic carcinoma     1/29/2024 [facility] BRCA1/2 testing: Negative. Larger hereditary cancer panel also negative.     2/7/2024: Left breast Magseed partial mastectomy with sentinel lymph node biopsy: Pathology yields a single focus of invasive ductal carcinoma, grade 3 measuring 3 cm.  There is high grade DCIS present.  All margins are negative.  1 of 4 lymph nodes examined with macrometastasis.  PT2 pN1a.     3/12/2024: Oncotype DX recurrence score 34     4/12/24: C1D1 adjuvant docetaxel/cyclophosphamide     6/14/2024: Last cycle of TC     Subjective: History of Present Illness     Patient presents today for follow-up visit.  She is  "undergoing radiation.  So far she is tolerating it well.  Her last radiation dose is 816.    She has recovered from chemotherapy with no residual side effects.    She has a vacation planned to [place] with her friend from -.      Gynecologic History:      Age of first menses: 12 years old  Age of last menses: 52 years old  ; FLB at age 28  Post-menopausal  She did not breastfeed  Uterus/Ovaries: Intact  OCP: 20 years     Pertinent Family history:     Breast Cancer:  Maternal aunt, maternal great aunt  Ovarian Cancer: None  Pancreatic Cancer: None  Other:  Paternal grandmother with colon cancer; father with prostate cancer and skin cancer     Social History  Bisi Reynaga \"Tanya\"  reports that she has never smoked. She has never been exposed to tobacco smoke. She has never used smokeless tobacco.  She  reports current alcohol use.  She  reports no history of drug use.     ROS:      Review of Systems   All other systems reviewed and are negative.        Physical Examination:     Vitals:    24 0907   BP: 124/85   Pulse: 74   Temp: 36.7 °C (98.1 °F)     Vitals:    24 0907   Weight: 78.7 kg (173 lb 6.4 oz)         Physical Exam  Vitals and nursing note reviewed.   Constitutional:       General: She is not in acute distress.     Appearance: Normal appearance. She is not toxic-appearing.   HENT:      Head: Normocephalic and atraumatic.   Eyes:      Conjunctiva/sclera: Conjunctivae normal.   Cardiovascular:      Rate and Rhythm: Normal rate.   Pulmonary:      Effort: Pulmonary effort is normal. No respiratory distress.   Abdominal:      General: Abdomen is flat.      Palpations: Abdomen is soft.   Musculoskeletal:         General: No swelling. Normal range of motion.      Cervical back: Normal range of motion.   Skin:     General: Skin is warm and dry.   Neurological:      General: No focal deficit present.      Mental Status: She is alert.   Psychiatric:         Mood and Affect: Mood normal.       "      Breast Examination: deferred at today's visit.      Baseline:   Left breast: No masses, skin or nipple changes  Right breast: No masses, skin or nipple changes  Axillary: No significant examination findings     ECOG Performance Status:      [x] 0 Fully active, able to carry on all pre-disease performance without restriction  [] 1 Restricted in physically strenuous activity but ambulatory and able to carry out work of a light or sedentary nature, e.g., light house work, office work  [] 2 Ambulatory and capable of all selfcare but unable to carry out any work activities; up and about more than 50% of waking hours  [] 3 Capable of only limited selfcare; confined to bed or chair more than 50% of waking hours  [] 4 Completely disabled; cannot carry on any selfcare; totally confined to bed or chair  [] 5 Dead      Results:     Labs:  Ordered today in anticipation of treatment     Imaging:  Reviewed above in Onc History     Pathology:  Reviewed above in Onc History     Assessment:      Pathologic prognostic stage IIA (pT2 pN1 cM0) infiltrating ductal carcinoma of the left breast; Dx in 12/2023; Grade 3; ER positive (>95%), VA positive (10%), HER2 negative (2+, neg GIOVANI); Oncotype DX 34 ; S/p L PM and SLNBx; S/p TC x4     Undergoing radiation.  Tolerating well.  Discussed adjuvant therapies which she will start after her trip.        Plan:     Surgical Plan: S/p L PM with SNLBx  Additional biopsy: No further biopsy indicated  Radiation Plan: Underway  Additional staging scans/DEXA/echo: Staging scans not indicated based on current stage, patient history and physical examination.  Baseline DEXA scan normal  Additional Path info (i.e Ki67, PDL1): Not indicated  Gene assays: Oncotype DX 34     Systemic treatment plan: Discussed adjuvant anastrozole and Verzenio. We discussed the regimen, schedule, common toxicities, strategies for managing toxicities and monitoring parameters. The patient had time to ask questions.  These  were sent to the appropriate pharmacy.  She will call us when she receives Verzenio for further instructions for blood work monitoring.              Intent: Curative              Clinical trial: Not eligible for our current trials              Endocrine therapy:  Anastrozole              HER2 treatment: not indicated              Targeted agents:  Verzenio              Chemotherapy: TCx4              BMA: We discussed the role of zoledronic acid as adjuvant therapy in post-menopausal (natural or induced) women with breast cancer who are on systemic therapy. Studies have shown that such BMAs can decrease risk of distant recurrence and breast cancer mortality. We discussed that this would be given as an infusion every 6 months for 3 years. We discussed the toxicities that may be associated with this including the risk of osteonecrosis of the jaw. I have recommended a dental evaluation before this is initiated. She was given written information on Zoledronic acid for her review. If she is interested in pursuing this therapy, we can initiate at her follow-up visit once she has received clearance from her dentist.      Access: Not indicated  Supportive meds: None prescribed this visit  Genetic testing: Completed; negative  Fertility preservation: Not indicated  Other active problems/orders:      N/A     Surveillance plan: Continue yearly mammogram     Follow-up: 2 months     Patient expressed understanding of the plan outlined above. She had ample time to ask questions. She understands she can contact us should she have additional questions or issues arise in the interim.

## 2024-08-14 ENCOUNTER — HOSPITAL ENCOUNTER (OUTPATIENT)
Dept: RADIATION ONCOLOGY | Facility: CLINIC | Age: 54
Setting detail: RADIATION/ONCOLOGY SERIES
Discharge: HOME | End: 2024-08-14
Payer: COMMERCIAL

## 2024-08-14 DIAGNOSIS — Z51.0 ENCOUNTER FOR ANTINEOPLASTIC RADIATION THERAPY: ICD-10-CM

## 2024-08-14 DIAGNOSIS — C50.412 MALIGNANT NEOPLASM OF UPPER-OUTER QUADRANT OF LEFT FEMALE BREAST (MULTI): ICD-10-CM

## 2024-08-14 DIAGNOSIS — C77.3 SECONDARY AND UNSPECIFIED MALIGNANT NEOPLASM OF AXILLA AND UPPER LIMB LYMPH NODES (MULTI): ICD-10-CM

## 2024-08-14 LAB
RAD ONC MSQ ACTUAL FRACTIONS DELIVERED: 1
RAD ONC MSQ ACTUAL SESSION DELIVERED DOSE: 250 CGRAY
RAD ONC MSQ ACTUAL TOTAL DOSE: 250 CGRAY
RAD ONC MSQ ELAPSED DAYS: 0
RAD ONC MSQ LAST DATE: NORMAL
RAD ONC MSQ PRESCRIBED FRACTIONAL DOSE: 250 CGRAY
RAD ONC MSQ PRESCRIBED NUMBER OF FRACTIONS: 4
RAD ONC MSQ PRESCRIBED TECHNIQUE: NORMAL
RAD ONC MSQ PRESCRIBED TOTAL DOSE: 1000 CGRAY
RAD ONC MSQ PRESCRIPTION PATTERN COMMENT: NORMAL
RAD ONC MSQ START DATE: NORMAL
RAD ONC MSQ TREATMENT COURSE NUMBER: 1
RAD ONC MSQ TREATMENT SITE: NORMAL

## 2024-08-14 PROCEDURE — 77412 RADIATION TX DELIVERY LVL 3: CPT | Performed by: RADIOLOGY

## 2024-08-14 PROCEDURE — 77387 GUIDANCE FOR RADJ TX DLVR: CPT | Performed by: RADIOLOGY

## 2024-08-14 NOTE — PROGRESS NOTES
"Bisi Reynaga female   1970 54 y.o.   [id number]      Chief Complaint  Clinical exam, history of left breast cancer    History Of Present Illness  Bisi Reynaga \"Marlon" is a very pleasant 54 y.o. female s/p left breast Magseed localized partial mastectomy, left axillary SLNB with dual tracer and Magseed localization on 24 for left breast invasive ductal carcinoma, grade 3, ER + >95%, NC + 10%, HER2 negative. Final pathology demonstrated invasive ductal carcinoma and DCIS, grade 3, 3 cm, negative margins, 1/4 lymph nodes with macrometastasis (10 mm with extranodal extension), ER + >95%, NC + 10%, HER2 negative. She underwent genetic testing which was negative for deleterious mutations. She had post-operative complications with a left axillary abscess, since resolved. Oncotype score 34. She completed adjuvant chemotherapy on 2024. She completed post-operative radiation on 2024. She will plan to start Anastrozole on 2024 following return from vacation.   Stage IIA [id number]    REPRODUCTIVE HISTORY: menarche age 12, , first birth age 28, did not breastfeed, OCP's x 20+ years, natural menopause age 52, no HRT, scattered fibroglandular tissue     FAMILY CANCER HISTORY:   Maternal Aunt: Breast cancer, age 40  Maternal Great Aunt: Breast cancer, age 45  Paternal Grandmother: Colon cancer, age 65  Father: Prostate cancer, age 58 and skin cancer, age 60    Surgical History  She has a past surgical history that includes BI US guided breast localization and biopsy left (Left, 2023) and Breast lumpectomy (Left, 2024).     Social History  She reports that she has never smoked. She has never been exposed to tobacco smoke. She has never used smokeless tobacco. She reports current alcohol use. She reports that she does not use drugs.    Family History  Family History   Problem Relation Name Age of Onset    Breast cancer Other  40        great maternal aunt    Breast cancer Mother's Sister  40 "        Allergies  Hydromorphone, Clarithromycin, and Pollen extracts    Medications  Current Outpatient Medications   Medication Instructions    abemaciclib (VERZENIO) 150 mg, oral, 2 times daily, Swallow whole.    anastrozole (ARIMIDEX) 1 mg, oral, Daily, Swallow whole with a drink of water.    cetirizine (ZYRTEC) 10 mg, oral    loratadine (CLARITIN) 10 mg, oral, Daily    montelukast (SINGULAIR) 10 mg, oral, Daily    multivit-min/ferrous fumarate (MULTI VITAMIN ORAL) oral    ondansetron (ZOFRAN) 8 mg, oral, Every 8 hours PRN    pantoprazole (PROTONIX) 40 mg, oral, Daily    prochlorperazine (COMPAZINE) 10 mg, oral, Every 6 hours PRN         REVIEW OF SYSTEMS    Constitutional:  Negative for appetite change, fatigue, fever and unexpected weight change.   HENT:  Negative for ear pain, hearing loss, nosebleeds, sore throat and trouble swallowing.    Eyes:  Negative for discharge, itching and visual disturbance.   Respiratory:  Negative for cough, chest tightness and shortness of breath.    Cardiovascular:  Negative for chest pain, palpitations and leg swelling.   Breast: as indicated in HPI  Gastrointestinal:  Negative for abdominal pain, constipation, diarrhea and nausea.   Endocrine: Negative for cold intolerance and heat intolerance.   Genitourinary:  Negative for dysuria, frequency, hematuria, pelvic pain and vaginal bleeding.   Musculoskeletal:  Negative for arthralgias, back pain, gait problem, joint swelling and myalgias.   Skin:  Negative for color change and rash.   Allergic/Immunologic: Negative for environmental allergies and food allergies.   Neurological:  Negative for dizziness, tremors, speech difficulty, weakness, numbness and headaches.   Hematological:  Does not bruise/bleed easily.   Psychiatric/Behavioral:  Negative for agitation, dysphoric mood and sleep disturbance. The patient is not nervous/anxious.         Past Medical History  She has a past medical history of Breast cancer (Multi) and  Personal history of urinary calculi (09/25/2014).     Physical Exam  Patient is alert and oriented x3 and in a relaxed and appropriate mood. Her gait is steady and hand grasps are equal. Sclera is clear. The breasts are nearly symmetrical, large and pendulous. The tissue is soft without palpable abnormalities, discrete nodules or masses. The left breast has a well-healed partial mastectomy incision in the superior lateral quadrant. The left axilla has a well-healed biopsy incision. The skin and nipples appear normal. There is no cervical, supraclavicular or axillary lymphadenopathy. Heart rate and rhythm normal, S1 and S2 appreciated. The lungs are clear to auscultation bilaterally. Abdomen is soft and non-tender.       Physical Exam  Chest:              Last Recorded Vitals  Vitals:    08/22/24 1010   BP: 115/81   Pulse: 66   Temp: 36.9 °C (98.4 °F)         Assessment/Plan   Normal clinical exam, history of left breast cancer, BRCA negative, family history of breast cancer, scattered fibroglandular tissue    Plan: Return in February 2025 for bilateral diagnostic mammogram and office visit. 6 months following completion of radiation. Start Anastrozole 1mg daily on 9/22/2024 as discussed.     Patient Discussion/Summary  Your clinical examination is normal. Please return in February 2025 for bilateral diagnostic mammogram and office visit or sooner if you have any problems or concerns. Start Anastrozole 1mg daily on 9/22/2024 as discussed.     You can see your health information, review clinical summaries from office visits & test results online when you follow your health with MY  Chart, a personal health record. To sign up go to [email or web address]. If you need assistance with signing up or trouble getting into your account call [employer] Patient Line 24/7 at [phone].    My office phone number is [phone] if you need to get in touch with me or have additional questions or concerns. Thank you for  choosing [facility] and trusting me as your healthcare provider. I look forward to seeing you again at your next office visit. I am honored to be a provider on your health care team and I remain dedicated to helping you achieve your health goals.      Steffi Santiago, APRN-CNP

## 2024-08-15 ENCOUNTER — HOSPITAL ENCOUNTER (OUTPATIENT)
Dept: RADIATION ONCOLOGY | Facility: CLINIC | Age: 54
Setting detail: RADIATION/ONCOLOGY SERIES
Discharge: HOME | End: 2024-08-15
Payer: COMMERCIAL

## 2024-08-15 DIAGNOSIS — C50.412 MALIGNANT NEOPLASM OF UPPER-OUTER QUADRANT OF LEFT BREAST IN FEMALE, ESTROGEN RECEPTOR POSITIVE (MULTI): ICD-10-CM

## 2024-08-15 DIAGNOSIS — C77.3 SECONDARY AND UNSPECIFIED MALIGNANT NEOPLASM OF AXILLA AND UPPER LIMB LYMPH NODES (MULTI): ICD-10-CM

## 2024-08-15 DIAGNOSIS — Z51.0 ENCOUNTER FOR ANTINEOPLASTIC RADIATION THERAPY: ICD-10-CM

## 2024-08-15 DIAGNOSIS — C50.412 MALIGNANT NEOPLASM OF UPPER-OUTER QUADRANT OF LEFT FEMALE BREAST (MULTI): ICD-10-CM

## 2024-08-15 DIAGNOSIS — Z17.0 MALIGNANT NEOPLASM OF UPPER-OUTER QUADRANT OF LEFT BREAST IN FEMALE, ESTROGEN RECEPTOR POSITIVE (MULTI): ICD-10-CM

## 2024-08-15 LAB
RAD ONC MSQ ACTUAL FRACTIONS DELIVERED: 2
RAD ONC MSQ ACTUAL FRACTIONS DELIVERED: 3
RAD ONC MSQ ACTUAL SESSION DELIVERED DOSE: 250 CGRAY
RAD ONC MSQ ACTUAL SESSION DELIVERED DOSE: 250 CGRAY
RAD ONC MSQ ACTUAL TOTAL DOSE: 500 CGRAY
RAD ONC MSQ ACTUAL TOTAL DOSE: 750 CGRAY
RAD ONC MSQ ELAPSED DAYS: 1
RAD ONC MSQ ELAPSED DAYS: 1
RAD ONC MSQ LAST DATE: NORMAL
RAD ONC MSQ LAST DATE: NORMAL
RAD ONC MSQ PRESCRIBED FRACTIONAL DOSE: 250 CGRAY
RAD ONC MSQ PRESCRIBED FRACTIONAL DOSE: 250 CGRAY
RAD ONC MSQ PRESCRIBED NUMBER OF FRACTIONS: 4
RAD ONC MSQ PRESCRIBED NUMBER OF FRACTIONS: 4
RAD ONC MSQ PRESCRIBED TECHNIQUE: NORMAL
RAD ONC MSQ PRESCRIBED TECHNIQUE: NORMAL
RAD ONC MSQ PRESCRIBED TOTAL DOSE: 1000 CGRAY
RAD ONC MSQ PRESCRIBED TOTAL DOSE: 1000 CGRAY
RAD ONC MSQ PRESCRIPTION PATTERN COMMENT: NORMAL
RAD ONC MSQ PRESCRIPTION PATTERN COMMENT: NORMAL
RAD ONC MSQ START DATE: NORMAL
RAD ONC MSQ START DATE: NORMAL
RAD ONC MSQ TREATMENT COURSE NUMBER: 1
RAD ONC MSQ TREATMENT COURSE NUMBER: 1
RAD ONC MSQ TREATMENT SITE: NORMAL
RAD ONC MSQ TREATMENT SITE: NORMAL

## 2024-08-15 PROCEDURE — 77412 RADIATION TX DELIVERY LVL 3: CPT | Performed by: RADIOLOGY

## 2024-08-15 PROCEDURE — 77387 GUIDANCE FOR RADJ TX DLVR: CPT | Performed by: RADIOLOGY

## 2024-08-16 ENCOUNTER — HOSPITAL ENCOUNTER (OUTPATIENT)
Dept: RADIATION ONCOLOGY | Facility: CLINIC | Age: 54
Setting detail: RADIATION/ONCOLOGY SERIES
Discharge: HOME | End: 2024-08-16
Payer: COMMERCIAL

## 2024-08-16 ENCOUNTER — DOCUMENTATION (OUTPATIENT)
Dept: RADIATION ONCOLOGY | Facility: CLINIC | Age: 54
End: 2024-08-16
Payer: COMMERCIAL

## 2024-08-16 ENCOUNTER — RADIATION ONCOLOGY OTV (OUTPATIENT)
Dept: RADIATION ONCOLOGY | Facility: CLINIC | Age: 54
End: 2024-08-16
Payer: COMMERCIAL

## 2024-08-16 VITALS
HEART RATE: 74 BPM | WEIGHT: 173.28 LBS | RESPIRATION RATE: 18 BRPM | DIASTOLIC BLOOD PRESSURE: 92 MMHG | OXYGEN SATURATION: 98 % | BODY MASS INDEX: 25.58 KG/M2 | SYSTOLIC BLOOD PRESSURE: 135 MMHG | TEMPERATURE: 97 F

## 2024-08-16 DIAGNOSIS — Z51.0 ENCOUNTER FOR ANTINEOPLASTIC RADIATION THERAPY: ICD-10-CM

## 2024-08-16 DIAGNOSIS — C50.412 MALIGNANT NEOPLASM OF UPPER-OUTER QUADRANT OF LEFT FEMALE BREAST (MULTI): ICD-10-CM

## 2024-08-16 DIAGNOSIS — C77.3 SECONDARY AND UNSPECIFIED MALIGNANT NEOPLASM OF AXILLA AND UPPER LIMB LYMPH NODES (MULTI): ICD-10-CM

## 2024-08-16 LAB
RAD ONC MSQ ACTUAL FRACTIONS DELIVERED: 4
RAD ONC MSQ ACTUAL SESSION DELIVERED DOSE: 250 CGRAY
RAD ONC MSQ ACTUAL TOTAL DOSE: 1000 CGRAY
RAD ONC MSQ ELAPSED DAYS: 2
RAD ONC MSQ LAST DATE: NORMAL
RAD ONC MSQ PRESCRIBED FRACTIONAL DOSE: 250 CGRAY
RAD ONC MSQ PRESCRIBED NUMBER OF FRACTIONS: 4
RAD ONC MSQ PRESCRIBED TECHNIQUE: NORMAL
RAD ONC MSQ PRESCRIBED TOTAL DOSE: 1000 CGRAY
RAD ONC MSQ PRESCRIPTION PATTERN COMMENT: NORMAL
RAD ONC MSQ START DATE: NORMAL
RAD ONC MSQ TREATMENT COURSE NUMBER: 1
RAD ONC MSQ TREATMENT SITE: NORMAL

## 2024-08-16 PROCEDURE — 77336 RADIATION PHYSICS CONSULT: CPT | Performed by: RADIOLOGY

## 2024-08-16 PROCEDURE — 77387 GUIDANCE FOR RADJ TX DLVR: CPT | Performed by: RADIOLOGY

## 2024-08-16 PROCEDURE — 77412 RADIATION TX DELIVERY LVL 3: CPT | Performed by: RADIOLOGY

## 2024-08-16 ASSESSMENT — ENCOUNTER SYMPTOMS
LOSS OF SENSATION IN FEET: 0
DEPRESSION: 0
OCCASIONAL FEELINGS OF UNSTEADINESS: 0

## 2024-08-16 ASSESSMENT — PAIN SCALES - GENERAL: PAINLEVEL: 0-NO PAIN

## 2024-08-16 NOTE — PROGRESS NOTES
Radiation Oncology Treatment Summary    Patient Name:  Bisi Reynaga  MRN:  [id number]  :  1970    Referring Provider: Raegan Herrera MD  Primary Care Provider: Tre Raymond DO    Brief History: Bisi Reynaga is a 54 y.o. female with Malignant neoplasm of upper-outer quadrant of left breast in female, estrogen receptor positive (Multi), Clinical: Stage IIA (cT1c, cN1, cM0, G3, ER+, LA+, HER2-)  Malignant neoplasm of upper-outer quadrant of left breast in female, estrogen receptor positive (Multi), Pathologic: Stage IIA (pT2, pN1a(sn), cM0, G3, ER+, LA+, HER2-).  The patient completed radiotherapy as outlined below.    Radiation Treatment Summary:    Other Treatments    Treatment Period Technique Fraction Dose Fractions Total Dose   Course 1 2024-2024  (days elapsed: 25)         Lt breast/low ax 2024-2024 3D 266 / 266 cGy  4256 / 4,256 cGy         Lt TB 2024-2024 3D 250 / 250 cGy  1000 / 1,000 cGy       Please see the patient's [facility] chart for further details regarding the radiation plan, including beam energy.    Concurrent Chemotherapy:  Treatment Plans       Name Type Plan Dates Plan Provider         Active    TC (DOCEtaxel / Cyclophosphamide), 21 Day Cycles Oncology Treatment  2024 - Present Silvia Bhatia MD                    CTCAE Toxicity Overview:   Toxicity Assessment          2024    12:37 2024    09:29 2024    12:38 2024    12:23   Toxicity Assessment   Adverse Events Reviewed (WDL) No (Exceptions to WDL) No (Exceptions to WDL) No (Exceptions to WDL) No (Exceptions to WDL)   Treatment Site Breast Breast Breast Breast   Anorexia Grade 0 Grade 0 Grade 0 Grade 0   Dermatitis Radiation Grade 0       given skin products with instructions of use.  skin intact. Grade 1       faint erythema; skin intact.  using skin products daily. Grade 1       mild erythema; skin intact.  using skin products as instructed. Grade 1       using  skin products daily; instructed to use skin products until healed.  skin is intact.  mild erythema.  intermittent itching.   Fatigue Grade 0 Grade 0 Grade 1 Grade 1   Breast Pain Grade 0 Grade 0       mild heaviness; soreness of left breast. Grade 0 Grade 0   Edema Limbs Grade 0  Grade 0 Grade 0   Lymphedema Grade 0 Grade 0 Grade 0 Grade 0     Patient Disposition: Ms. Reynaga will return to the clinic in 4 to 6 weeks to follow up with Jessica Frye CNP.  Ms. Reynaga is instructed to contact the clinic with any concerns prior to the follow-up appointment.

## 2024-08-16 NOTE — PROGRESS NOTES
Radiation Oncology On Treatment Visit    Patient Name:  Bisi Reynaga  MRN:  [id number]  :  1970    Referring Provider: No ref. provider found  Primary Care Provider: Tre Raymond DO  Care Team: Patient Care Team:  Tre Raymond DO as PCP - General  Tre Raymond DO as PCP - Maureen FREEMAN PCP  Silvia Bhatia MD as Consulting Physician (Hematology and Oncology)    Date of Service: 2024     Diagnosis:   Specialty Problems          Radiation Oncology Problems    Malignant neoplasm of upper-outer quadrant of left female breast (Multi)        Malignant neoplasm of upper-outer quadrant of left breast in female, estrogen receptor positive (Multi)         Treatment Summary:  Other Treatments    Treatment Period Technique Fraction Dose Fractions Total Dose   Course 1 2024-2024  (days elapsed: 25)         Lt breast/low ax 2024-2024 3D 266 / 266 cGy  4256 / 4,256 cGy         Lt TB 2024-2024 3D 250 / 250 cGy  1000 / 1,000 cGy     SUBJECTIVE: No complaints.        OBJECTIVE:   Vital Signs:  BP (!) 135/92   Pulse 74   Temp 36.1 °C (97 °F) (Core)   Resp 18   Wt 78.6 kg (173 lb 4.5 oz)   SpO2 98%   BMI 25.58 kg/m²    Pain Scale: The patient's current pain level was assessed.  They report currently having a pain of 0 out of 10.    Other Pertinent Findings: Mild erythema, skin intact.    Toxicity Assessment          2024    12:37 2024    09:29 2024    12:38 2024    12:23   Toxicity Assessment   Adverse Events Reviewed (WDL) No (Exceptions to WDL) No (Exceptions to WDL) No (Exceptions to WDL) No (Exceptions to WDL)   Treatment Site Breast Breast Breast Breast   Anorexia Grade 0 Grade 0 Grade 0 Grade 0   Dermatitis Radiation Grade 0       given skin products with instructions of use.  skin intact. Grade 1       faint erythema; skin intact.  using skin products daily. Grade 1       mild erythema; skin intact.  using skin products as  instructed. Grade 1       using skin products daily; instructed to use skin products until healed.  skin is intact.  mild erythema.  intermittent itching.   Fatigue Grade 0 Grade 0 Grade 1 Grade 1   Breast Pain Grade 0 Grade 0       mild heaviness; soreness of left breast. Grade 0 Grade 0   Edema Limbs Grade 0  Grade 0 Grade 0   Lymphedema Grade 0 Grade 0 Grade 0 Grade 0        Assessment / Plan:  The patient tolerated radiation therapy as anticipated.   Chart and films reviewed and approved.

## 2024-08-19 ENCOUNTER — APPOINTMENT (OUTPATIENT)
Dept: RADIATION ONCOLOGY | Facility: CLINIC | Age: 54
End: 2024-08-19
Payer: COMMERCIAL

## 2024-08-22 ENCOUNTER — OFFICE VISIT (OUTPATIENT)
Dept: SURGICAL ONCOLOGY | Facility: CLINIC | Age: 54
End: 2024-08-22
Payer: COMMERCIAL

## 2024-08-22 VITALS
BODY MASS INDEX: 25.86 KG/M2 | WEIGHT: 175.2 LBS | DIASTOLIC BLOOD PRESSURE: 81 MMHG | SYSTOLIC BLOOD PRESSURE: 115 MMHG | TEMPERATURE: 98.4 F | HEART RATE: 66 BPM

## 2024-08-22 DIAGNOSIS — Z85.3 ENCOUNTER FOR FOLLOW-UP SURVEILLANCE OF BREAST CANCER: Primary | ICD-10-CM

## 2024-08-22 DIAGNOSIS — Z08 ENCOUNTER FOR FOLLOW-UP SURVEILLANCE OF BREAST CANCER: Primary | ICD-10-CM

## 2024-08-22 PROCEDURE — 99213 OFFICE O/P EST LOW 20 MIN: CPT | Performed by: NURSE PRACTITIONER

## 2024-08-22 PROCEDURE — 3079F DIAST BP 80-89 MM HG: CPT | Performed by: NURSE PRACTITIONER

## 2024-08-22 PROCEDURE — 3074F SYST BP LT 130 MM HG: CPT | Performed by: NURSE PRACTITIONER

## 2024-08-22 PROCEDURE — 1036F TOBACCO NON-USER: CPT | Performed by: NURSE PRACTITIONER

## 2024-08-22 ASSESSMENT — PATIENT HEALTH QUESTIONNAIRE - PHQ9
1. LITTLE INTEREST OR PLEASURE IN DOING THINGS: NOT AT ALL
2. FEELING DOWN, DEPRESSED OR HOPELESS: NOT AT ALL
SUM OF ALL RESPONSES TO PHQ9 QUESTIONS 1 & 2: 0

## 2024-08-22 ASSESSMENT — PAIN SCALES - GENERAL: PAINLEVEL: 0-NO PAIN

## 2024-08-22 NOTE — PATIENT INSTRUCTIONS
Your clinical examination is normal. Please return in February 2025 for bilateral diagnostic mammogram and office visit or sooner if you have any problems or concerns. Start Anastrozole 1mg daily on 9/22/2024 as discussed.     You can see your health information, review clinical summaries from office visits & test results online when you follow your health with MY  Chart, a personal health record. To sign up go to [email or web address]. If you need assistance with signing up or trouble getting into your account call [employer] Patient Line 24/7 at [phone].    My office phone number is [phone] if you need to get in touch with me or have additional questions or concerns. Thank you for choosing [facility] and trusting me as your healthcare provider. I look forward to seeing you again at your next office visit. I am honored to be a provider on your health care team and I remain dedicated to helping you achieve your health goals.

## 2024-09-18 DIAGNOSIS — C50.412 MALIGNANT NEOPLASM OF UPPER-OUTER QUADRANT OF LEFT BREAST IN FEMALE, ESTROGEN RECEPTOR POSITIVE: ICD-10-CM

## 2024-09-18 DIAGNOSIS — Z17.0 MALIGNANT NEOPLASM OF UPPER-OUTER QUADRANT OF LEFT BREAST IN FEMALE, ESTROGEN RECEPTOR POSITIVE: ICD-10-CM

## 2024-09-19 ENCOUNTER — ONCOLOGY MEDICATION OUTREACH (OUTPATIENT)
Dept: HEMATOLOGY/ONCOLOGY | Facility: CLINIC | Age: 54
End: 2024-09-19
Payer: COMMERCIAL

## 2024-09-24 ENCOUNTER — ONCOLOGY MEDICATION OUTREACH (OUTPATIENT)
Dept: HEMATOLOGY/ONCOLOGY | Facility: CLINIC | Age: 54
End: 2024-09-24
Payer: COMMERCIAL

## 2024-09-25 DIAGNOSIS — C50.412 MALIGNANT NEOPLASM OF UPPER-OUTER QUADRANT OF LEFT BREAST IN FEMALE, ESTROGEN RECEPTOR POSITIVE: Primary | ICD-10-CM

## 2024-09-25 DIAGNOSIS — Z17.0 MALIGNANT NEOPLASM OF UPPER-OUTER QUADRANT OF LEFT BREAST IN FEMALE, ESTROGEN RECEPTOR POSITIVE: Primary | ICD-10-CM

## 2024-10-09 ENCOUNTER — TELEPHONE (OUTPATIENT)
Dept: RADIATION ONCOLOGY | Facility: HOSPITAL | Age: 54
End: 2024-10-09
Payer: COMMERCIAL

## 2024-10-10 ENCOUNTER — HOSPITAL ENCOUNTER (OUTPATIENT)
Dept: RADIATION ONCOLOGY | Facility: HOSPITAL | Age: 54
Setting detail: RADIATION/ONCOLOGY SERIES
Discharge: HOME | End: 2024-10-10
Payer: COMMERCIAL

## 2024-10-10 ENCOUNTER — LAB (OUTPATIENT)
Dept: LAB | Facility: HOSPITAL | Age: 54
End: 2024-10-10
Payer: COMMERCIAL

## 2024-10-10 VITALS
OXYGEN SATURATION: 98 % | DIASTOLIC BLOOD PRESSURE: 81 MMHG | BODY MASS INDEX: 25.74 KG/M2 | HEART RATE: 74 BPM | RESPIRATION RATE: 18 BRPM | WEIGHT: 173.8 LBS | SYSTOLIC BLOOD PRESSURE: 116 MMHG | HEIGHT: 69 IN | TEMPERATURE: 96.8 F

## 2024-10-10 DIAGNOSIS — Z17.0 MALIGNANT NEOPLASM OF UPPER-OUTER QUADRANT OF LEFT BREAST IN FEMALE, ESTROGEN RECEPTOR POSITIVE: ICD-10-CM

## 2024-10-10 DIAGNOSIS — C50.412 MALIGNANT NEOPLASM OF UPPER-OUTER QUADRANT OF LEFT BREAST IN FEMALE, ESTROGEN RECEPTOR POSITIVE: Primary | ICD-10-CM

## 2024-10-10 DIAGNOSIS — Z90.12 STATUS POST PARTIAL MASTECTOMY OF LEFT BREAST: ICD-10-CM

## 2024-10-10 DIAGNOSIS — Z17.0 MALIGNANT NEOPLASM OF UPPER-OUTER QUADRANT OF LEFT BREAST IN FEMALE, ESTROGEN RECEPTOR POSITIVE: Primary | ICD-10-CM

## 2024-10-10 DIAGNOSIS — C50.412 MALIGNANT NEOPLASM OF UPPER-OUTER QUADRANT OF LEFT BREAST IN FEMALE, ESTROGEN RECEPTOR POSITIVE: ICD-10-CM

## 2024-10-10 LAB
ALBUMIN SERPL BCP-MCNC: 4.4 G/DL (ref 3.4–5)
ALP SERPL-CCNC: 68 U/L (ref 33–110)
ALT SERPL W P-5'-P-CCNC: 16 U/L (ref 7–45)
ANION GAP SERPL CALC-SCNC: 10 MMOL/L (ref 10–20)
AST SERPL W P-5'-P-CCNC: 15 U/L (ref 9–39)
BASOPHILS # BLD AUTO: 0.03 X10*3/UL (ref 0–0.1)
BASOPHILS NFR BLD AUTO: 1 %
BILIRUB SERPL-MCNC: 0.6 MG/DL (ref 0–1.2)
BUN SERPL-MCNC: 15 MG/DL (ref 6–23)
CALCIUM SERPL-MCNC: 9.7 MG/DL (ref 8.6–10.3)
CHLORIDE SERPL-SCNC: 105 MMOL/L (ref 98–107)
CO2 SERPL-SCNC: 30 MMOL/L (ref 21–32)
CREAT SERPL-MCNC: 0.94 MG/DL (ref 0.5–1.05)
EGFRCR SERPLBLD CKD-EPI 2021: 72 ML/MIN/1.73M*2
EOSINOPHIL # BLD AUTO: 0.13 X10*3/UL (ref 0–0.7)
EOSINOPHIL NFR BLD AUTO: 4.3 %
ERYTHROCYTE [DISTWIDTH] IN BLOOD BY AUTOMATED COUNT: 12.7 % (ref 11.5–14.5)
GLUCOSE SERPL-MCNC: 80 MG/DL (ref 74–99)
HCT VFR BLD AUTO: 42.8 % (ref 36–46)
HGB BLD-MCNC: 14.7 G/DL (ref 12–16)
IMM GRANULOCYTES # BLD AUTO: 0 X10*3/UL (ref 0–0.7)
IMM GRANULOCYTES NFR BLD AUTO: 0 % (ref 0–0.9)
LYMPHOCYTES # BLD AUTO: 1.26 X10*3/UL (ref 1.2–4.8)
LYMPHOCYTES NFR BLD AUTO: 41.4 %
MCH RBC QN AUTO: 29.3 PG (ref 26–34)
MCHC RBC AUTO-ENTMCNC: 34.3 G/DL (ref 32–36)
MCV RBC AUTO: 85 FL (ref 80–100)
MONOCYTES # BLD AUTO: 0.19 X10*3/UL (ref 0.1–1)
MONOCYTES NFR BLD AUTO: 6.3 %
NEUTROPHILS # BLD AUTO: 1.43 X10*3/UL (ref 1.2–7.7)
NEUTROPHILS NFR BLD AUTO: 47 %
NRBC BLD-RTO: 0 /100 WBCS (ref 0–0)
PLATELET # BLD AUTO: 166 X10*3/UL (ref 150–450)
POTASSIUM SERPL-SCNC: 4.2 MMOL/L (ref 3.5–5.3)
PROT SERPL-MCNC: 7.3 G/DL (ref 6.4–8.2)
RBC # BLD AUTO: 5.01 X10*6/UL (ref 4–5.2)
SODIUM SERPL-SCNC: 141 MMOL/L (ref 136–145)
WBC # BLD AUTO: 3 X10*3/UL (ref 4.4–11.3)

## 2024-10-10 PROCEDURE — 99214 OFFICE O/P EST MOD 30 MIN: CPT | Performed by: NURSE PRACTITIONER

## 2024-10-10 PROCEDURE — 85025 COMPLETE CBC W/AUTO DIFF WBC: CPT

## 2024-10-10 PROCEDURE — 36415 COLL VENOUS BLD VENIPUNCTURE: CPT

## 2024-10-10 PROCEDURE — 84075 ASSAY ALKALINE PHOSPHATASE: CPT

## 2024-10-10 ASSESSMENT — COLUMBIA-SUICIDE SEVERITY RATING SCALE - C-SSRS
2. HAVE YOU ACTUALLY HAD ANY THOUGHTS OF KILLING YOURSELF?: NO
6. HAVE YOU EVER DONE ANYTHING, STARTED TO DO ANYTHING, OR PREPARED TO DO ANYTHING TO END YOUR LIFE?: NO
1. IN THE PAST MONTH, HAVE YOU WISHED YOU WERE DEAD OR WISHED YOU COULD GO TO SLEEP AND NOT WAKE UP?: NO

## 2024-10-10 ASSESSMENT — ENCOUNTER SYMPTOMS
OCCASIONAL FEELINGS OF UNSTEADINESS: 0
LOSS OF SENSATION IN FEET: 0

## 2024-10-10 ASSESSMENT — PAIN SCALES - GENERAL: PAINLEVEL: 0-NO PAIN

## 2024-10-10 NOTE — PROGRESS NOTES
"Radiation Oncology Follow-Up    Patient Name:  Bisi Reynaga  MRN:  [id number]  :  1970    Referring Provider: No ref. provider found  Primary Care Provider: Tre Raymond DO  Care Team: Patient Care Team:  Tre Raymond DO as PCP - General  Tre Raymond DO as PCP - Maureen FREEMAN PCP  Silvia Bhatia MD as Consulting Physician (Hematology and Oncology)    Date of Service: 10/10/2024     SUBJECTIVE  History of Present Illness:   Bisi Reynaga \"Marlon" is a 54 y.o. female who was previously seen at the [facility] Department of Radiation Oncology for ***.      Review of Systems:    Review of Systems - Oncology    Performance Status:   The Karnofsky performance scale today is {DESC; KARNOFSKY SCALE WITH ECOG EQUIVALENT:54769}.        OBJECTIVE  Vital Signs:  /81   Pulse 74   Temp 36 °C (96.8 °F) (Temporal)   Resp 18   Ht 1.753 m (5' 9\")   Wt 78.8 kg (173 lb 12.8 oz)   SpO2 98%   BMI 25.67 kg/m²      Current Outpatient Medications:     abemaciclib (Verzenio) 150 mg tablet, Take 1 tablet (150 mg total) by mouth 2 times a day.  Swallow whole., Disp: 56 tablet, Rfl: 2    anastrozole (Arimidex) 1 mg tablet, Take 1 tablet (1 mg total) by mouth once daily.  Swallow whole with a drink of water., Disp: 90 tablet, Rfl: 1    cetirizine (ZyrTEC) 10 mg tablet, Take 1 tablet (10 mg) by mouth., Disp: , Rfl:     montelukast (Singulair) 10 mg tablet, TAKE ONE TABLET BY MOUTH EVERY DAY, Disp: 90 tablet, Rfl: 3    multivit-min/ferrous fumarate (MULTI VITAMIN ORAL), Take by mouth., Disp: , Rfl:     pantoprazole (ProtoNix) 40 mg EC tablet, Take 1 tablet (40 mg) by mouth once daily., Disp: 90 tablet, Rfl: 3    loratadine (Claritin) 10 mg tablet, Take 1 tablet (10 mg) by mouth once daily., Disp: , Rfl:     ondansetron (Zofran) 8 mg tablet, Take 1 tablet (8 mg) by mouth every 8 hours if needed for nausea or vomiting. (Patient not taking: Reported on 7/3/2024), Disp: 30 tablet, " Rfl: 5    prochlorperazine (Compazine) 10 mg tablet, Take 1 tablet (10 mg) by mouth every 6 hours if needed for nausea or vomiting. (Patient not taking: Reported on 7/3/2024), Disp: 30 tablet, Rfl: 5   Physical Exam:  Physical Exam        RESULTS:  No results found.     ASSESSMENT:  54 y.o. female with    PLAN:  ***  FUV***  Images***    Jessica Frye CNP  [phone]

## 2024-10-10 NOTE — PROGRESS NOTES
Radiation Oncology Follow-Up    Patient Name:  Bisi Reynaga  MRN:  [id number]  :  1970    Referring Provider: No ref. provider found  Primary Care Provider: Tre Raymond DO  Care Team: Patient Care Team:  Tre Raymond DO as PCP - General  Tre Raymond DO as PCP - Maureen FREEMAN PCP  Silvia Bhatia MD as Consulting Physician (Hematology and Oncology)    Date of Service: 10/10/2024   54 y.o. female with Malignant neoplasm of upper-outer quadrant of left breast in female, estrogen receptor positive (Multi), Clinical: Stage IIA (cT1c, cN1, cM0, G3, ER+, DE+, HER2-)  Malignant neoplasm of upper-outer quadrant of left breast in female, estrogen receptor positive (Multi), Pathologic: Stage IIA (pT2, pN1a(sn), cM0, G3, ER+, DE+, HER2-).  The patient completed radiotherapy as outlined below.     Oncologic History:     2023: Screening mammogram: New somewhat spiculated central lateral superior left breast mass and small nodule more anteriorly in the slight lateral left breast.     2023: Diagnostic breast imaging: Asymmetry in the medial right breast persists on additional imaging.  This is stable since 2005.  In the left breast there is an irregular high density mass with associated architectural distortion.  An oval circumscribed equal density mass in the central lateral left breast at middle depth persists.  On ultrasound there is no suspicious finding to correspond to the right breast asymmetry.  In the left breast an irregular hypoechoic mass is seen at the 1 o'clock position 8 cm from the nipple measuring 1.8 x 1.2 x 2.0 cm.  At 4:00 3 cm from the nipple there is a cyst.  Targeted ultrasound of the left axilla demonstrates 1 abnormal appearing left axillary lymph node and 4 normal-appearing lymph nodes.     2023: Left breast mass ultrasound-guided core needle biopsy: Invasive ductal carcinoma, grade 3.  ER positive (greater than 95%) DE positive (10%) HER2 equal focal,  "not amplified by dual-stephanie.     12/29/2023: Left axillary lymph node biopsy shows metastatic carcinoma     1/29/2024 [facility] BRCA1/2 testing: Negative. Larger hereditary cancer panel also negative.     2/7/2024: Left breast Magseed partial mastectomy with sentinel lymph node biopsy: Pathology yields a single focus of invasive ductal carcinoma, grade 3 measuring 3 cm.  There is high grade DCIS present.  All margins are negative.  1 of 4 lymph nodes examined with macrometastasis.  PT2 pN1a.     3/12/2024: Oncotype DX recurrence score 34     4/12/24: C1D1 adjuvant docetaxel/cyclophosphamide      6/14/2024: Last cycle of TC    8/16/24: Completed radiation:    Radiation Treatment Summary:            Other Treatments     Treatment Period Technique Fraction Dose Fractions Total Dose   Course 1 7/22/2024-8/16/2024  (days elapsed: 25)         Lt breast/low ax 7/22/2024-8/13/2024 3D 266 / 266 cGy 16 / 16 4256 / 4,256 cGy         Lt TB 8/14/2024-8/16/2024 3D 250 / 250 cGy 4 / 4 1000 / 1,000 cGy     Adjuvant endocrine therapy initiated with anastrozole.     SUBJECTIVE  History of Present Illness:   Bisi Reynaga \"Marlon" is here today for routine radiation follow up/initial radiation survivorship visit. She says she is doing well overall and energy level returning. Left breast healing well. She did have some peeling and skin darkening but resolving. Reports skin as intact. She has some pulling/cording sensation left arm and slight limitation in ROM.  No lymphedema reported. Denies headaches, fever, chills, cough, SOB, chest pain, n/v/c/d or bony pain. She started anastrozole and no adverse effects thus far. DEXA scan is normal bone density.    Review of Systems:    Review of Systems   All other systems reviewed and are negative.    Performance Status:   The Karnofsky performance scale today is 90, Able to carry on normal activity; minor signs or symptoms of disease (ECOG equivalent 0).        OBJECTIVE  Vital Signs:  /81   " "Pulse 74   Temp 36 °C (96.8 °F) (Temporal)   Resp 18   Ht 1.753 m (5' 9\")   Wt 78.8 kg (173 lb 12.8 oz)   SpO2 98%   BMI 25.67 kg/m²      Current Outpatient Medications:     abemaciclib (Verzenio) 150 mg tablet, Take 1 tablet (150 mg total) by mouth 2 times a day.  Swallow whole., Disp: 56 tablet, Rfl: 2    anastrozole (Arimidex) 1 mg tablet, Take 1 tablet (1 mg total) by mouth once daily.  Swallow whole with a drink of water., Disp: 90 tablet, Rfl: 1    cetirizine (ZyrTEC) 10 mg tablet, Take 1 tablet (10 mg) by mouth., Disp: , Rfl:     montelukast (Singulair) 10 mg tablet, TAKE ONE TABLET BY MOUTH EVERY DAY, Disp: 90 tablet, Rfl: 3    multivit-min/ferrous fumarate (MULTI VITAMIN ORAL), Take by mouth., Disp: , Rfl:     pantoprazole (ProtoNix) 40 mg EC tablet, Take 1 tablet (40 mg) by mouth once daily., Disp: 90 tablet, Rfl: 3    loratadine (Claritin) 10 mg tablet, Take 1 tablet (10 mg) by mouth once daily., Disp: , Rfl:     ondansetron (Zofran) 8 mg tablet, Take 1 tablet (8 mg) by mouth every 8 hours if needed for nausea or vomiting. (Patient not taking: Reported on 7/3/2024), Disp: 30 tablet, Rfl: 5    prochlorperazine (Compazine) 10 mg tablet, Take 1 tablet (10 mg) by mouth every 6 hours if needed for nausea or vomiting. (Patient not taking: Reported on 7/3/2024), Disp: 30 tablet, Rfl: 5     Physical Exam:  Physical Exam  Vitals reviewed.   Constitutional:       Appearance: Normal appearance.   HENT:      Head: Normocephalic and atraumatic.      Nose: Nose normal.      Mouth/Throat:      Mouth: Mucous membranes are moist.      Pharynx: Oropharynx is clear.   Eyes:      Conjunctiva/sclera: Conjunctivae normal.      Pupils: Pupils are equal, round, and reactive to light.   Cardiovascular:      Rate and Rhythm: Normal rate and regular rhythm.      Heart sounds: Normal heart sounds.   Pulmonary:      Effort: Pulmonary effort is normal.      Breath sounds: Normal breath sounds.   Abdominal:      Palpations: " Abdomen is soft.   Musculoskeletal:         General: No swelling. Normal range of motion.      Cervical back: Normal range of motion and neck supple.   Lymphadenopathy:      Cervical: No cervical adenopathy.   Skin:     General: Skin is warm and dry.   Neurological:      General: No focal deficit present.      Mental Status: She is alert and oriented to person, place, and time.   Psychiatric:         Mood and Affect: Mood normal.         Behavior: Behavior normal.           RESULTS:  Narrative & Impression   Interpreted By:  Bib Hardin,   STUDY:  DEXA BONE DENSITY6/20/2024 9:18 am      INDICATION:  The patient is a 54-year-old female who presents for screening.      COMPARISON:  None.      ACCESSION NUMBER(S):  [id number]      ORDERING CLINICIAN:  EDISON NELSON      TECHNIQUE:  DEXA BONE DENSITY      FINDINGS:  SPINE L1-L4  Bone Mineral Density: 1.271  T-Score 0.8  Z-Score 1.5  Classification:  Normal      LEFT FEMUR -TOTAL  Bone Mineral Density: 1.012  T-Score 0.0   Z-Score  0.7  Classification:  Normal      LEFT FEMUR -NECK  Bone Mineral Density: 1.035  T-Score 0.0  Z-Score 1.0  Classification:  Normal      World Health Organization (WHO) criteria for post-menopausal,   Women:  Normal:         T-score at or above -1 SD  Osteopenia:   T-score between -1 and -2.5 SD  Osteoporosis: T-score at or below -2.5 SD      10-year Fracture Risk:  Major Osteoporotic Fracture  10.1%  Hip Fracture                       0.1%      This exam was performed at [facility] on a EXPO Dexa Unit.      IMPRESSION:  DEXA:  According to World Health Organization criteria,  classification is normal.     ASSESSMENT:  54 y.o. female with stage IIA left breast cancer s/p breast conserving surgery followed by adjuvant chemotherapy followed by radiation.  Reviewed skin care, possible late effects of treatment, anastrozole side effects and follow up plan.     PLAN:    - Referral to  [employer] for good fitting supportive bra  - Referral to PT for left arm ROM and cording  - Continue anastrozole and FUV with Dr. Bhatia  - Mammogram and FUV with Dr. Herrera  - Radiation follow up in 6 mo.  Call us with any questions or concerns.     Jessica Frye CNP  [phone]

## 2024-10-15 ENCOUNTER — APPOINTMENT (OUTPATIENT)
Dept: OBSTETRICS AND GYNECOLOGY | Facility: CLINIC | Age: 54
End: 2024-10-15
Payer: COMMERCIAL

## 2024-10-15 VITALS
WEIGHT: 175 LBS | BODY MASS INDEX: 25.92 KG/M2 | SYSTOLIC BLOOD PRESSURE: 118 MMHG | DIASTOLIC BLOOD PRESSURE: 78 MMHG | HEIGHT: 69 IN

## 2024-10-15 DIAGNOSIS — Z01.419 WELL WOMAN EXAM: ICD-10-CM

## 2024-10-15 PROCEDURE — 88175 CYTOPATH C/V AUTO FLUID REDO: CPT

## 2024-10-15 PROCEDURE — 87624 HPV HI-RISK TYP POOLED RSLT: CPT

## 2024-10-15 PROCEDURE — 3008F BODY MASS INDEX DOCD: CPT | Performed by: OBSTETRICS & GYNECOLOGY

## 2024-10-15 PROCEDURE — 3078F DIAST BP <80 MM HG: CPT | Performed by: OBSTETRICS & GYNECOLOGY

## 2024-10-15 PROCEDURE — 1036F TOBACCO NON-USER: CPT | Performed by: OBSTETRICS & GYNECOLOGY

## 2024-10-15 PROCEDURE — 3074F SYST BP LT 130 MM HG: CPT | Performed by: OBSTETRICS & GYNECOLOGY

## 2024-10-15 PROCEDURE — 99386 PREV VISIT NEW AGE 40-64: CPT | Performed by: OBSTETRICS & GYNECOLOGY

## 2024-10-15 ASSESSMENT — PAIN SCALES - GENERAL: PAINLEVEL: 0-NO PAIN

## 2024-10-15 NOTE — PROGRESS NOTES
"Bisi Reynaga \"Marlon" is a 54 y.o. female who is here for a routine exam. PCP = Tre Raymond DO  Breast cancer diagnosed beginning of 2024 status post lumpectomy and chemotherapy    Menses : pm  Contraception : other  HPV vaccine : N/A  Last pap : 2021 normal  Last HPV : 2021 negative  History of abnormal pap : Yes, describe: Remote history of dysplasia status post LEEP  Last mammogram : 2023 abnormal, left breast cancer left breast cancer regular  History of abnormal mammogram : Yes, describe: Left breast cancer  Colon cancer screen : Colonoscopy age 50 normal    ROS  systems reviewed, anything negative noted in HPI    bladder: no dysuria, gross hematuria, urinary frequency, urgency or incontinence  breast: no lumps, nipple d/c, overlying skin changes, redness, or skin retraction    [unfilled]    Past med hx and past surg hx reviewed and notable for: Left breast cancer    Objective   /78   Ht 1.753 m (5' 9\")   Wt 79.4 kg (175 lb)   BMI 25.84 kg/m²      General:   Alert and oriented, in no acute distress   Neck: Supple. No visible thyromegaly.    Breast/Axilla: Normal to palpation bilaterally without masses, skin changes, lymphadenopathy, or nipple discharge.    Abdomen: Soft, non-tender, without masses or organomegaly   Vulva: Normal architecture without erythema, masses, or lesions.    Vagina: Normal mucosa without lesions, masses, or atrophy. No abnormal vaginal discharge.    Cervix: Normal without masses, lesions, or signs of cervicitis.    Uterus: Normal mobile, non-enlarged uterus    Adnexa: Normal without masses or lesions   Pelvic Floor No POP noted.    Psych Normal affect. Normal mood.      Thank you for coming to your annual exam. Your findings during the exam were normal. Specific topics addressed during this exam included: healthy lifestyle, well woman screening guidelines,     Actions performed during this visit include:  - Clinical breast exam  - Clinical pelvic exam  - pap/hpv  - Colon " cancer screen up to date  -Breast management per breast clinic  No orders of the defined types were placed in this encounter.          Please return for your next visit in 1 year.

## 2024-10-17 ENCOUNTER — OFFICE VISIT (OUTPATIENT)
Dept: HEMATOLOGY/ONCOLOGY | Facility: CLINIC | Age: 54
End: 2024-10-17
Payer: COMMERCIAL

## 2024-10-17 VITALS
DIASTOLIC BLOOD PRESSURE: 86 MMHG | WEIGHT: 167.66 LBS | TEMPERATURE: 98 F | SYSTOLIC BLOOD PRESSURE: 121 MMHG | HEART RATE: 74 BPM | BODY MASS INDEX: 24.76 KG/M2

## 2024-10-17 DIAGNOSIS — C50.412 MALIGNANT NEOPLASM OF UPPER-OUTER QUADRANT OF LEFT BREAST IN FEMALE, ESTROGEN RECEPTOR POSITIVE: ICD-10-CM

## 2024-10-17 DIAGNOSIS — Z17.0 MALIGNANT NEOPLASM OF UPPER-OUTER QUADRANT OF LEFT BREAST IN FEMALE, ESTROGEN RECEPTOR POSITIVE: ICD-10-CM

## 2024-10-17 PROCEDURE — 99215 OFFICE O/P EST HI 40 MIN: CPT | Performed by: STUDENT IN AN ORGANIZED HEALTH CARE EDUCATION/TRAINING PROGRAM

## 2024-10-17 PROCEDURE — 3079F DIAST BP 80-89 MM HG: CPT | Performed by: STUDENT IN AN ORGANIZED HEALTH CARE EDUCATION/TRAINING PROGRAM

## 2024-10-17 PROCEDURE — 3074F SYST BP LT 130 MM HG: CPT | Performed by: STUDENT IN AN ORGANIZED HEALTH CARE EDUCATION/TRAINING PROGRAM

## 2024-10-17 ASSESSMENT — PAIN SCALES - GENERAL: PAINLEVEL_OUTOF10: 0-NO PAIN

## 2024-10-17 NOTE — PROGRESS NOTES
Breast Medical Oncology Clinic  Location: [facility]     Visit Type: Follow Up Patient Visit     Oncologic History:     12/21/2023: Screening mammogram: New somewhat spiculated central lateral superior left breast mass and small nodule more anteriorly in the slight lateral left breast.     12/26/2023: Diagnostic breast imaging: Asymmetry in the medial right breast persists on additional imaging.  This is stable since August 2005.  In the left breast there is an irregular high density mass with associated architectural distortion.  An oval circumscribed equal density mass in the central lateral left breast at middle depth persists.  On ultrasound there is no suspicious finding to correspond to the right breast asymmetry.  In the left breast an irregular hypoechoic mass is seen at the 1 o'clock position 8 cm from the nipple measuring 1.8 x 1.2 x 2.0 cm.  At 4:00 3 cm from the nipple there is a cyst.  Targeted ultrasound of the left axilla demonstrates 1 abnormal appearing left axillary lymph node and 4 normal-appearing lymph nodes.     12/29/2023: Left breast mass ultrasound-guided core needle biopsy: Invasive ductal carcinoma, grade 3.  ER positive (greater than 95%) VT positive (10%) HER2 equal focal, not amplified by dual-stephanie.     12/29/2023: Left axillary lymph node biopsy shows metastatic carcinoma     1/29/2024 [facility] BRCA1/2 testing: Negative. Larger hereditary cancer panel also negative.     2/7/2024: Left breast Magseed partial mastectomy with sentinel lymph node biopsy: Pathology yields a single focus of invasive ductal carcinoma, grade 3 measuring 3 cm.  There is high grade DCIS present.  All margins are negative.  1 of 4 lymph nodes examined with macrometastasis.  PT2 pN1a.     3/12/2024: Oncotype DX recurrence score 34     4/12/24: C1D1 adjuvant docetaxel/cyclophosphamide     6/14/2024: Last cycle of TC    7/22/24-8/16/24: Completed radiation    9/22/24: Started anastrozole and verzenio     Subjective:  "History of Present Illness     Presents today for follow-up visit. Accompanied by her mother.     Overall doing well. Radiation went well.     Has been on verzenio about 3 weeks. Reports diarrhea 1-2 times daily max. She has been taking imodium as needed.    Mild fatigue but remains active, working and caring for grandchild.     Denies weight loss, changes in the breast and/or chest wall, new aches or pains, changes in appetite or energy level.     In need for dental work before starting zometa, has not been cleared yet.     Gynecologic History:      Age of first menses: 12 years old  Age of last menses: 52 years old  ; FLB at age 28  Post-menopausal  She did not breastfeed  Uterus/Ovaries: Intact  OCP: 20 years     Pertinent Family history:     Breast Cancer:  Maternal aunt, maternal great aunt  Ovarian Cancer: None  Pancreatic Cancer: None  Other:  Paternal grandmother with colon cancer; father with prostate cancer and skin cancer     Social History  Bisi Reynaga \"Tanya\"  reports that she has never smoked. She has never been exposed to tobacco smoke. She has never used smokeless tobacco.  She  reports current alcohol use.  She  reports no history of drug use.     ROS:      Review of Systems   All other systems reviewed and are negative.        Physical Examination:     Vitals:    10/17/24 0938   BP: 121/86   Pulse: 74   Temp: 36.7 °C (98 °F)     There were no vitals filed for this visit.        Physical Exam  Vitals and nursing note reviewed.   Constitutional:       General: She is not in acute distress.     Appearance: Normal appearance. She is not toxic-appearing.   HENT:      Head: Normocephalic and atraumatic.   Eyes:      Conjunctiva/sclera: Conjunctivae normal.   Cardiovascular:      Rate and Rhythm: Normal rate.   Pulmonary:      Effort: Pulmonary effort is normal. No respiratory distress.   Abdominal:      General: Abdomen is flat.      Palpations: Abdomen is soft.   Musculoskeletal:         General: " No swelling. Normal range of motion.      Cervical back: Normal range of motion.   Skin:     General: Skin is warm and dry.   Neurological:      General: No focal deficit present.      Mental Status: She is alert.   Psychiatric:         Mood and Affect: Mood normal.      Breast Examination:   Left breast: No masses, skin or nipple changes  Right breast: No masses, skin or nipple changes  Axillary: No significant examination findings     ECOG Performance Status:      [x] 0 Fully active, able to carry on all pre-disease performance without restriction  [] 1 Restricted in physically strenuous activity but ambulatory and able to carry out work of a light or sedentary nature, e.g., light house work, office work  [] 2 Ambulatory and capable of all selfcare but unable to carry out any work activities; up and about more than 50% of waking hours  [] 3 Capable of only limited selfcare; confined to bed or chair more than 50% of waking hours  [] 4 Completely disabled; cannot carry on any selfcare; totally confined to bed or chair  [] 5 Dead      Results:     Labs:  10/10/24 C1D14 labs reviewed     Imaging:  Reviewed above in Onc History     Pathology:  Reviewed above in Onc History     Assessment:      Pathologic prognostic stage IIA (pT2 pN1 cM0) infiltrating ductal carcinoma of the left breast; Dx in 12/2023; Grade 3; ER positive (>95%), OR positive (10%), HER2 negative (2+, neg GIOVANI); Oncotype DX 34 ; S/p L PM and SLNBx; S/p TC x4; S/p radiation; On anastrozole and verzenio.      Tolerating treatment well. No evidence of breast cancer recurrence per patient history, physical examination and imaging findings.       Plan:     Surgical Plan: S/p L PM with SNLBx  Additional biopsy: No further biopsy indicated  Radiation Plan: Completed  Additional staging scans/DEXA/echo: Staging scans not indicated based on current stage, patient history and physical examination.  Baseline DEXA scan normal  Additional Path info (i.e Ki67, PDL1):  Not indicated  Gene assays: Oncotype DX 34     Systemic treatment plan: Anastrozole and Verzenio.               Intent: Curative              Clinical trial: Not eligible for our current trials              Endocrine therapy:  Anastrozole              HER2 treatment: not indicated              Targeted agents:  Verzenio              Chemotherapy: TCx4              BMA: We discussed the role of zoledronic acid as adjuvant therapy in post-menopausal (natural or induced) women with breast cancer who are on systemic therapy. She is in need of dental work prior to starting.      Access: Not indicated  Supportive meds: None prescribed this visit  Genetic testing: Completed; negative  Fertility preservation: Not indicated  Other active problems/orders:      We spent a portion of our encounter discussing lifestyle modifications that may help with cancer outcomes and overall wellbeing. We discussed regular exercise aiming for 30 minutes 5 times a week. We discussed diet modifications such as limiting red meat and processed foods. We also discussed limiting alcohol intake.     Surveillance plan: Continue yearly mammogram; C2 verzenio labs in 1 week, then monthly     Follow-up: 3 months     Patient expressed understanding of the plan outlined above. She had ample time to ask questions. She understands she can contact us should she have additional questions or issues arise in the interim.

## 2024-10-17 NOTE — PATIENT INSTRUCTIONS
Please call us at [phone] option 5 then option 2 with any questions or concerns      Start of next verzenio cycle get labs prior to starting  Monthly labs in after cycle 2  Follow up 3mths  Continue anastrozole

## 2024-10-24 ENCOUNTER — ONCOLOGY MEDICATION OUTREACH (OUTPATIENT)
Dept: HEMATOLOGY/ONCOLOGY | Facility: CLINIC | Age: 54
End: 2024-10-24

## 2024-10-24 ENCOUNTER — LAB (OUTPATIENT)
Dept: LAB | Facility: LAB | Age: 54
End: 2024-10-24
Payer: COMMERCIAL

## 2024-10-24 DIAGNOSIS — Z17.0 MALIGNANT NEOPLASM OF UPPER-OUTER QUADRANT OF LEFT BREAST IN FEMALE, ESTROGEN RECEPTOR POSITIVE: ICD-10-CM

## 2024-10-24 DIAGNOSIS — C50.412 MALIGNANT NEOPLASM OF UPPER-OUTER QUADRANT OF LEFT BREAST IN FEMALE, ESTROGEN RECEPTOR POSITIVE: ICD-10-CM

## 2024-10-24 LAB
ALBUMIN SERPL BCP-MCNC: 4.1 G/DL (ref 3.4–5)
ALP SERPL-CCNC: 71 U/L (ref 33–110)
ALT SERPL W P-5'-P-CCNC: 18 U/L (ref 7–45)
ANION GAP SERPL CALC-SCNC: 12 MMOL/L (ref 10–20)
AST SERPL W P-5'-P-CCNC: 17 U/L (ref 9–39)
BASOPHILS # BLD AUTO: 0.03 X10*3/UL (ref 0–0.1)
BASOPHILS NFR BLD AUTO: 1.2 %
BILIRUB SERPL-MCNC: 0.4 MG/DL (ref 0–1.2)
BUN SERPL-MCNC: 9 MG/DL (ref 6–23)
CALCIUM SERPL-MCNC: 9.8 MG/DL (ref 8.6–10.6)
CHLORIDE SERPL-SCNC: 107 MMOL/L (ref 98–107)
CO2 SERPL-SCNC: 31 MMOL/L (ref 21–32)
CREAT SERPL-MCNC: 0.86 MG/DL (ref 0.5–1.05)
EGFRCR SERPLBLD CKD-EPI 2021: 80 ML/MIN/1.73M*2
EOSINOPHIL # BLD AUTO: 0.04 X10*3/UL (ref 0–0.7)
EOSINOPHIL NFR BLD AUTO: 1.6 %
ERYTHROCYTE [DISTWIDTH] IN BLOOD BY AUTOMATED COUNT: 13.3 % (ref 11.5–14.5)
GLUCOSE SERPL-MCNC: 77 MG/DL (ref 74–99)
HCT VFR BLD AUTO: 38.8 % (ref 36–46)
HGB BLD-MCNC: 13.3 G/DL (ref 12–16)
IMM GRANULOCYTES # BLD AUTO: 0.01 X10*3/UL (ref 0–0.7)
IMM GRANULOCYTES NFR BLD AUTO: 0.4 % (ref 0–0.9)
LYMPHOCYTES # BLD AUTO: 0.71 X10*3/UL (ref 1.2–4.8)
LYMPHOCYTES NFR BLD AUTO: 28.7 %
MCH RBC QN AUTO: 29.6 PG (ref 26–34)
MCHC RBC AUTO-ENTMCNC: 34.3 G/DL (ref 32–36)
MCV RBC AUTO: 86 FL (ref 80–100)
MONOCYTES # BLD AUTO: 0.18 X10*3/UL (ref 0.1–1)
MONOCYTES NFR BLD AUTO: 7.3 %
NEUTROPHILS # BLD AUTO: 1.5 X10*3/UL (ref 1.2–7.7)
NEUTROPHILS NFR BLD AUTO: 60.8 %
NRBC BLD-RTO: 0 /100 WBCS (ref 0–0)
PLATELET # BLD AUTO: 189 X10*3/UL (ref 150–450)
POTASSIUM SERPL-SCNC: 4.6 MMOL/L (ref 3.5–5.3)
PROT SERPL-MCNC: 6.5 G/DL (ref 6.4–8.2)
RBC # BLD AUTO: 4.5 X10*6/UL (ref 4–5.2)
SODIUM SERPL-SCNC: 145 MMOL/L (ref 136–145)
WBC # BLD AUTO: 2.5 X10*3/UL (ref 4.4–11.3)

## 2024-10-24 PROCEDURE — 80053 COMPREHEN METABOLIC PANEL: CPT

## 2024-10-24 PROCEDURE — 36415 COLL VENOUS BLD VENIPUNCTURE: CPT

## 2024-10-24 PROCEDURE — 85025 COMPLETE CBC W/AUTO DIFF WBC: CPT

## 2024-10-29 LAB
CYTOLOGY CMNT CVX/VAG CYTO-IMP: NORMAL
HPV HR 12 DNA GENITAL QL NAA+PROBE: NEGATIVE
HPV HR GENOTYPES PNL CVX NAA+PROBE: NEGATIVE
HPV16 DNA SPEC QL NAA+PROBE: NEGATIVE
HPV18 DNA SPEC QL NAA+PROBE: NEGATIVE
LAB AP HPV GENOTYPE QUESTION: YES
LAB AP HPV HR: NORMAL
LABORATORY COMMENT REPORT: NORMAL
PATH REPORT.TOTAL CANCER: NORMAL

## 2024-11-21 ENCOUNTER — TELEPHONE (OUTPATIENT)
Dept: HEMATOLOGY/ONCOLOGY | Facility: HOSPITAL | Age: 54
End: 2024-11-21
Payer: COMMERCIAL

## 2024-11-21 DIAGNOSIS — C50.412 MALIGNANT NEOPLASM OF UPPER-OUTER QUADRANT OF LEFT BREAST IN FEMALE, ESTROGEN RECEPTOR POSITIVE: ICD-10-CM

## 2024-11-21 DIAGNOSIS — Z17.0 MALIGNANT NEOPLASM OF UPPER-OUTER QUADRANT OF LEFT BREAST IN FEMALE, ESTROGEN RECEPTOR POSITIVE: ICD-10-CM

## 2024-11-21 NOTE — TELEPHONE ENCOUNTER
Refill request received for Verzenio 150mg.  Preferred pharmacy is [facility].  Message sent to team to refill if appropriate.

## 2024-11-23 ENCOUNTER — LAB (OUTPATIENT)
Dept: LAB | Facility: LAB | Age: 54
End: 2024-11-23
Payer: COMMERCIAL

## 2024-11-23 DIAGNOSIS — Z17.0 MALIGNANT NEOPLASM OF UPPER-OUTER QUADRANT OF LEFT BREAST IN FEMALE, ESTROGEN RECEPTOR POSITIVE: ICD-10-CM

## 2024-11-23 DIAGNOSIS — C50.412 MALIGNANT NEOPLASM OF UPPER-OUTER QUADRANT OF LEFT BREAST IN FEMALE, ESTROGEN RECEPTOR POSITIVE: ICD-10-CM

## 2024-11-23 PROCEDURE — 85025 COMPLETE CBC W/AUTO DIFF WBC: CPT

## 2024-11-23 PROCEDURE — 36415 COLL VENOUS BLD VENIPUNCTURE: CPT

## 2024-11-23 PROCEDURE — 80053 COMPREHEN METABOLIC PANEL: CPT

## 2024-11-24 LAB
ALBUMIN SERPL BCP-MCNC: 4.4 G/DL (ref 3.4–5)
ALP SERPL-CCNC: 53 U/L (ref 33–110)
ALT SERPL W P-5'-P-CCNC: 16 U/L (ref 7–45)
ANION GAP SERPL CALC-SCNC: 12 MMOL/L (ref 10–20)
AST SERPL W P-5'-P-CCNC: 15 U/L (ref 9–39)
BASOPHILS # BLD AUTO: 0.06 X10*3/UL (ref 0–0.1)
BASOPHILS NFR BLD AUTO: 2.5 %
BILIRUB SERPL-MCNC: 0.6 MG/DL (ref 0–1.2)
BUN SERPL-MCNC: 9 MG/DL (ref 6–23)
CALCIUM SERPL-MCNC: 9.8 MG/DL (ref 8.6–10.6)
CHLORIDE SERPL-SCNC: 107 MMOL/L (ref 98–107)
CO2 SERPL-SCNC: 29 MMOL/L (ref 21–32)
CREAT SERPL-MCNC: 0.96 MG/DL (ref 0.5–1.05)
EGFRCR SERPLBLD CKD-EPI 2021: 70 ML/MIN/1.73M*2
EOSINOPHIL # BLD AUTO: 0.03 X10*3/UL (ref 0–0.7)
EOSINOPHIL NFR BLD AUTO: 1.2 %
ERYTHROCYTE [DISTWIDTH] IN BLOOD BY AUTOMATED COUNT: 16.5 % (ref 11.5–14.5)
GLUCOSE SERPL-MCNC: 90 MG/DL (ref 74–99)
HCT VFR BLD AUTO: 37.2 % (ref 36–46)
HGB BLD-MCNC: 12.7 G/DL (ref 12–16)
IMM GRANULOCYTES # BLD AUTO: 0 X10*3/UL (ref 0–0.7)
IMM GRANULOCYTES NFR BLD AUTO: 0 % (ref 0–0.9)
LYMPHOCYTES # BLD AUTO: 1.13 X10*3/UL (ref 1.2–4.8)
LYMPHOCYTES NFR BLD AUTO: 46.7 %
MCH RBC QN AUTO: 30.4 PG (ref 26–34)
MCHC RBC AUTO-ENTMCNC: 34.1 G/DL (ref 32–36)
MCV RBC AUTO: 89 FL (ref 80–100)
MONOCYTES # BLD AUTO: 0.19 X10*3/UL (ref 0.1–1)
MONOCYTES NFR BLD AUTO: 7.9 %
NEUTROPHILS # BLD AUTO: 1.01 X10*3/UL (ref 1.2–7.7)
NEUTROPHILS NFR BLD AUTO: 41.7 %
NRBC BLD-RTO: 0 /100 WBCS (ref 0–0)
PLATELET # BLD AUTO: 229 X10*3/UL (ref 150–450)
POTASSIUM SERPL-SCNC: 3.8 MMOL/L (ref 3.5–5.3)
PROT SERPL-MCNC: 6.8 G/DL (ref 6.4–8.2)
RBC # BLD AUTO: 4.18 X10*6/UL (ref 4–5.2)
SODIUM SERPL-SCNC: 144 MMOL/L (ref 136–145)
WBC # BLD AUTO: 2.4 X10*3/UL (ref 4.4–11.3)

## 2024-11-25 ENCOUNTER — ONCOLOGY MEDICATION OUTREACH (OUTPATIENT)
Dept: HEMATOLOGY/ONCOLOGY | Facility: CLINIC | Age: 54
End: 2024-11-25
Payer: COMMERCIAL

## 2024-11-26 NOTE — TELEPHONE ENCOUNTER
Dr. Bhatia recommends pt test for COVID.  Since symptoms are mild, she can monitor.  If worsening, a chest xray can be ordered.  Discussed with pt, she is agreeable to doing a COVID test and will monitor for new or worsening symptoms.  
Pt with congestion and cough starting late last week.  Denies fevers, chest pain or difficulty breathing.  Sputum has started to have a yellow color the past 2 days.  She did not test for COVID.  Pt is still able to complete ADL's and go to work.  Her next follow up and treatment are 5/2 and 5/3/24 respectively.  Additional Information   Commented on: What helps these problems?     Mucinex helps in the short term    Protocols used: Cough    
None known

## 2024-12-12 ENCOUNTER — PATIENT MESSAGE (OUTPATIENT)
Dept: PRIMARY CARE | Facility: CLINIC | Age: 54
End: 2024-12-12
Payer: COMMERCIAL

## 2024-12-12 DIAGNOSIS — J01.00 ACUTE NON-RECURRENT MAXILLARY SINUSITIS: Primary | ICD-10-CM

## 2024-12-12 RX ORDER — CEPHALEXIN 500 MG/1
500 CAPSULE ORAL 3 TIMES DAILY
Qty: 30 CAPSULE | Refills: 0 | Status: SHIPPED | OUTPATIENT
Start: 2024-12-12 | End: 2024-12-22

## 2024-12-15 ENCOUNTER — HOSPITAL ENCOUNTER (OUTPATIENT)
Dept: RADIOLOGY | Facility: CLINIC | Age: 54
Discharge: HOME | End: 2024-12-15
Payer: COMMERCIAL

## 2024-12-15 ENCOUNTER — OFFICE VISIT (OUTPATIENT)
Dept: URGENT CARE | Age: 54
End: 2024-12-15
Payer: COMMERCIAL

## 2024-12-15 VITALS
SYSTOLIC BLOOD PRESSURE: 99 MMHG | TEMPERATURE: 98.3 F | RESPIRATION RATE: 18 BRPM | DIASTOLIC BLOOD PRESSURE: 50 MMHG | HEART RATE: 90 BPM | OXYGEN SATURATION: 98 %

## 2024-12-15 DIAGNOSIS — R68.89 FLU-LIKE SYMPTOMS: ICD-10-CM

## 2024-12-15 LAB
POC RAPID INFLUENZA A: NEGATIVE
POC RAPID INFLUENZA B: NEGATIVE

## 2024-12-15 PROCEDURE — 99213 OFFICE O/P EST LOW 20 MIN: CPT | Performed by: PHYSICIAN ASSISTANT

## 2024-12-15 PROCEDURE — 87804 INFLUENZA ASSAY W/OPTIC: CPT | Performed by: PHYSICIAN ASSISTANT

## 2024-12-15 PROCEDURE — 1036F TOBACCO NON-USER: CPT | Performed by: PHYSICIAN ASSISTANT

## 2024-12-15 PROCEDURE — 71046 X-RAY EXAM CHEST 2 VIEWS: CPT | Performed by: PHYSICIAN ASSISTANT

## 2024-12-15 PROCEDURE — 3078F DIAST BP <80 MM HG: CPT | Performed by: PHYSICIAN ASSISTANT

## 2024-12-15 PROCEDURE — 71046 X-RAY EXAM CHEST 2 VIEWS: CPT | Mod: FOREIGN READ | Performed by: RADIOLOGY

## 2024-12-15 PROCEDURE — 3074F SYST BP LT 130 MM HG: CPT | Performed by: PHYSICIAN ASSISTANT

## 2024-12-15 PROCEDURE — 71046 X-RAY EXAM CHEST 2 VIEWS: CPT

## 2024-12-15 RX ORDER — BENZONATATE 200 MG/1
200 CAPSULE ORAL 3 TIMES DAILY PRN
Qty: 30 CAPSULE | Refills: 0 | Status: SHIPPED | OUTPATIENT
Start: 2024-12-15

## 2024-12-15 RX ORDER — AMOXICILLIN AND CLAVULANATE POTASSIUM 875; 125 MG/1; MG/1
1 TABLET, FILM COATED ORAL EVERY 12 HOURS
Qty: 20 TABLET | Refills: 0 | Status: SHIPPED | OUTPATIENT
Start: 2024-12-15 | End: 2024-12-15 | Stop reason: ENTERED-IN-ERROR

## 2024-12-15 RX ORDER — PREDNISONE 20 MG/1
TABLET ORAL
Qty: 18 TABLET | Refills: 0 | Status: SHIPPED | OUTPATIENT
Start: 2024-12-15 | End: 2024-12-23

## 2024-12-15 NOTE — PROGRESS NOTES
"Subjective   Patient ID: Bisi Reynaga \"Marlon" is a 54 y.o. female. They present today with a chief complaint of sick    Patient disposition: Home    HISTORY OF PRESENT ILLNESS:    Adult female presents for respiratory sx. Admits 5 days of bl earache, sinus pain and pressure, cough, ST. Her PCP sent a Rx for Keflex after a messaging conversation and she has been taking this.  was dxd with influenza and also PNA. Denies CP, dyspnea, HA. Admits subjective fevers.    Past Medical History  Allergies as of 12/15/2024 - Reviewed 12/15/2024   Allergen Reaction Noted    Hydromorphone Nausea Only and Other 01/13/2023    Clarithromycin GI Upset 11/08/2021    Pollen extracts Other 02/28/2024       (Not in a hospital admission)       Past Medical History:   Diagnosis Date    Breast cancer (Multi)     Personal history of urinary calculi 09/25/2014    Personal history of renal calculi       Past Surgical History:   Procedure Laterality Date    BI US GUIDED BREAST LOCALIZATION AND BIOPSY LEFT Left 12/29/2023    BI US GUIDED BREAST LOCALIZATION AND BIOPSY LEFT 12/29/2023 Adriana So MD [facility]    BREAST LUMPECTOMY Left 02/07/2024    FINGER SURGERY          reports that she has never smoked. She has never been exposed to tobacco smoke. She has never used smokeless tobacco. She reports current alcohol use. She reports that she does not use drugs.    Review of Systems    Negative except as documented in the History of Present Illness.                             Objective    Vitals:    12/15/24 1232   BP: 99/50   Pulse: 90   Resp: 18   Temp: 36.8 °C (98.3 °F)   SpO2: 98%     No LMP recorded. Patient has had an ablation.      PHYSICAL EXAMINATION:    CONSTITUTIONAL: well-appearing, nontoxic         ENT:  Head and face are unremarkable and atraumatic. Mucous membranes moist.    * Oropharynx nl. Airway patent.    * No uvular deviation. No visible abscess.    * Lymphadenopathy absent.    * TMs nl bl.         LUNGS:  CTAB, no " r/r/w.    CARDIOVASCULAR:   RRR, no m/r/g. Nl S1/S2.    ABDOMEN:  Nontender including left upper quadrant, nondistended, no acute abdomen.     MUSCULOSKELETAL: No obvious deformities. SHOEMAKER with equal strength. Gait normal.    SKIN:   Warm and dry with no rashes.    NEURO:  Normal baseline mental status.    PSYCH: Appropriate mood and affect.         ------------------------------------------         MDM: Rapid influenza negative. CXR interpreted by me independently also negative for any PNA. Ddx does include acute bronchitis as result of recent respiratory infxn. Prednisone, Tessalon course Rxd and will fu here or at PCP PRN if worsening.        Procedures    Diagnostic study results (if any) were reviewed by [facility].    No results found for this visit on 12/15/24.     Assessment/Plan   Allergies, medications, history, and pertinent labs/EKGs/Imaging reviewed by Henry Teran PA-C.     Orders and Diagnoses  Diagnoses and all orders for this visit:  Flu-like symptoms  -     POCT Influenza A/B manually resulted      Medical Admin Record      Follow Up Instructions  No follow-ups on file.    Electronically signed by [facility]  12:35 PM

## 2024-12-20 ENCOUNTER — ONCOLOGY MEDICATION OUTREACH (OUTPATIENT)
Dept: HEMATOLOGY/ONCOLOGY | Facility: CLINIC | Age: 54
End: 2024-12-20
Payer: COMMERCIAL

## 2025-01-02 ENCOUNTER — LAB (OUTPATIENT)
Dept: LAB | Facility: LAB | Age: 55
End: 2025-01-02
Payer: COMMERCIAL

## 2025-01-02 DIAGNOSIS — C50.412 MALIGNANT NEOPLASM OF UPPER-OUTER QUADRANT OF LEFT BREAST IN FEMALE, ESTROGEN RECEPTOR POSITIVE: ICD-10-CM

## 2025-01-02 DIAGNOSIS — Z17.0 MALIGNANT NEOPLASM OF UPPER-OUTER QUADRANT OF LEFT BREAST IN FEMALE, ESTROGEN RECEPTOR POSITIVE: ICD-10-CM

## 2025-01-02 LAB
ALBUMIN SERPL BCP-MCNC: 4 G/DL (ref 3.4–5)
ALP SERPL-CCNC: 54 U/L (ref 33–110)
ALT SERPL W P-5'-P-CCNC: 14 U/L (ref 7–45)
ANION GAP SERPL CALC-SCNC: 10 MMOL/L (ref 10–20)
AST SERPL W P-5'-P-CCNC: 13 U/L (ref 9–39)
BASOPHILS # BLD MANUAL: 0.13 X10*3/UL (ref 0–0.1)
BASOPHILS NFR BLD MANUAL: 6.2 %
BILIRUB SERPL-MCNC: 0.6 MG/DL (ref 0–1.2)
BUN SERPL-MCNC: 9 MG/DL (ref 6–23)
CALCIUM SERPL-MCNC: 9.5 MG/DL (ref 8.6–10.6)
CHLORIDE SERPL-SCNC: 109 MMOL/L (ref 98–107)
CO2 SERPL-SCNC: 29 MMOL/L (ref 21–32)
CREAT SERPL-MCNC: 0.87 MG/DL (ref 0.5–1.05)
EGFRCR SERPLBLD CKD-EPI 2021: 79 ML/MIN/1.73M*2
EOSINOPHIL # BLD MANUAL: 0.04 X10*3/UL (ref 0–0.7)
EOSINOPHIL NFR BLD MANUAL: 1.8 %
ERYTHROCYTE [DISTWIDTH] IN BLOOD BY AUTOMATED COUNT: 16.8 % (ref 11.5–14.5)
GLUCOSE SERPL-MCNC: 74 MG/DL (ref 74–99)
HCT VFR BLD AUTO: 32.1 % (ref 36–46)
HGB BLD-MCNC: 10.9 G/DL (ref 12–16)
IMM GRANULOCYTES # BLD AUTO: 0 X10*3/UL (ref 0–0.7)
IMM GRANULOCYTES NFR BLD AUTO: 0 % (ref 0–0.9)
LYMPHOCYTES # BLD MANUAL: 0.48 X10*3/UL (ref 1.2–4.8)
LYMPHOCYTES NFR BLD MANUAL: 23 %
MCH RBC QN AUTO: 33.4 PG (ref 26–34)
MCHC RBC AUTO-ENTMCNC: 34 G/DL (ref 32–36)
MCV RBC AUTO: 99 FL (ref 80–100)
METAMYELOCYTES # BLD MANUAL: 0.02 X10*3/UL
METAMYELOCYTES NFR BLD MANUAL: 0.9 %
MONOCYTES # BLD MANUAL: 0.13 X10*3/UL (ref 0.1–1)
MONOCYTES NFR BLD MANUAL: 6.2 %
MYELOCYTES # BLD MANUAL: 0.02 X10*3/UL
MYELOCYTES NFR BLD MANUAL: 0.9 %
NEUTROPHILS # BLD MANUAL: 1.22 X10*3/UL (ref 1.2–7.7)
NEUTS BAND # BLD MANUAL: 0.05 X10*3/UL (ref 0–0.7)
NEUTS BAND NFR BLD MANUAL: 2.6 %
NEUTS SEG # BLD MANUAL: 1.17 X10*3/UL (ref 1.2–7)
NEUTS SEG NFR BLD MANUAL: 55.7 %
NRBC BLD-RTO: 0 /100 WBCS (ref 0–0)
OVALOCYTES BLD QL SMEAR: ABNORMAL
PLATELET # BLD AUTO: 171 X10*3/UL (ref 150–450)
POTASSIUM SERPL-SCNC: 3.9 MMOL/L (ref 3.5–5.3)
PROT SERPL-MCNC: 6.1 G/DL (ref 6.4–8.2)
RBC # BLD AUTO: 3.26 X10*6/UL (ref 4–5.2)
RBC MORPH BLD: ABNORMAL
SODIUM SERPL-SCNC: 144 MMOL/L (ref 136–145)
TOTAL CELLS COUNTED BLD: 113
VARIANT LYMPHS # BLD MANUAL: 0.06 X10*3/UL (ref 0–0.5)
VARIANT LYMPHS NFR BLD: 2.7 %
WBC # BLD AUTO: 2.1 X10*3/UL (ref 4.4–11.3)

## 2025-01-02 PROCEDURE — 80053 COMPREHEN METABOLIC PANEL: CPT

## 2025-01-02 PROCEDURE — 85027 COMPLETE CBC AUTOMATED: CPT

## 2025-01-02 PROCEDURE — 85007 BL SMEAR W/DIFF WBC COUNT: CPT

## 2025-01-16 ENCOUNTER — APPOINTMENT (OUTPATIENT)
Dept: HEMATOLOGY/ONCOLOGY | Facility: CLINIC | Age: 55
End: 2025-01-16
Payer: COMMERCIAL

## 2025-01-16 ENCOUNTER — ONCOLOGY MEDICATION OUTREACH (OUTPATIENT)
Dept: HEMATOLOGY/ONCOLOGY | Facility: CLINIC | Age: 55
End: 2025-01-16

## 2025-01-16 VITALS
OXYGEN SATURATION: 100 % | HEART RATE: 74 BPM | WEIGHT: 163.03 LBS | SYSTOLIC BLOOD PRESSURE: 126 MMHG | DIASTOLIC BLOOD PRESSURE: 84 MMHG | BODY MASS INDEX: 24.08 KG/M2

## 2025-01-16 DIAGNOSIS — Z17.0 MALIGNANT NEOPLASM OF UPPER-OUTER QUADRANT OF LEFT BREAST IN FEMALE, ESTROGEN RECEPTOR POSITIVE: ICD-10-CM

## 2025-01-16 DIAGNOSIS — C50.412 MALIGNANT NEOPLASM OF UPPER-OUTER QUADRANT OF LEFT BREAST IN FEMALE, ESTROGEN RECEPTOR POSITIVE: ICD-10-CM

## 2025-01-16 PROCEDURE — 3079F DIAST BP 80-89 MM HG: CPT | Performed by: STUDENT IN AN ORGANIZED HEALTH CARE EDUCATION/TRAINING PROGRAM

## 2025-01-16 PROCEDURE — 3074F SYST BP LT 130 MM HG: CPT | Performed by: STUDENT IN AN ORGANIZED HEALTH CARE EDUCATION/TRAINING PROGRAM

## 2025-01-16 PROCEDURE — 99215 OFFICE O/P EST HI 40 MIN: CPT | Performed by: STUDENT IN AN ORGANIZED HEALTH CARE EDUCATION/TRAINING PROGRAM

## 2025-01-16 ASSESSMENT — PAIN SCALES - GENERAL: PAINLEVEL_OUTOF10: 0-NO PAIN

## 2025-01-16 NOTE — PATIENT INSTRUCTIONS
Continue taking Anastrozole and Verzenio. Verzenio was decreased to 100mg twice daily. Please let us know when you receive the new prescription from  Specialty Pharmacy.  2.   Monthly lab draws.  3.    Let us know once you receive dental clearance for zometa infusions.  4.   Follow up with Dr. Bhatia in 3 months.  5.   Please call [phone] with symptoms, questions or concerns.

## 2025-01-16 NOTE — PROGRESS NOTES
Breast Medical Oncology Clinic  Location: [facility]     Visit Type: Follow Up Patient Visit     Oncologic History:     12/21/2023: Screening mammogram: New somewhat spiculated central lateral superior left breast mass and small nodule more anteriorly in the slight lateral left breast.     12/26/2023: Diagnostic breast imaging: Asymmetry in the medial right breast persists on additional imaging.  This is stable since August 2005.  In the left breast there is an irregular high density mass with associated architectural distortion.  An oval circumscribed equal density mass in the central lateral left breast at middle depth persists.  On ultrasound there is no suspicious finding to correspond to the right breast asymmetry.  In the left breast an irregular hypoechoic mass is seen at the 1 o'clock position 8 cm from the nipple measuring 1.8 x 1.2 x 2.0 cm.  At 4:00 3 cm from the nipple there is a cyst.  Targeted ultrasound of the left axilla demonstrates 1 abnormal appearing left axillary lymph node and 4 normal-appearing lymph nodes.     12/29/2023: Left breast mass ultrasound-guided core needle biopsy: Invasive ductal carcinoma, grade 3.  ER positive (greater than 95%) SC positive (10%) HER2 equal focal, not amplified by dual-stephanie.     12/29/2023: Left axillary lymph node biopsy shows metastatic carcinoma     1/29/2024 [facility] BRCA1/2 testing: Negative. Larger hereditary cancer panel also negative.     2/7/2024: Left breast Magseed partial mastectomy with sentinel lymph node biopsy: Pathology yields a single focus of invasive ductal carcinoma, grade 3 measuring 3 cm.  There is high grade DCIS present.  All margins are negative.  1 of 4 lymph nodes examined with macrometastasis.  PT2 pN1a.     3/12/2024: Oncotype DX recurrence score 34     4/12/24: C1D1 adjuvant docetaxel/cyclophosphamide     6/14/2024: Last cycle of TC    7/22/24-8/16/24: Completed radiation    9/22/24: Started anastrozole and verzenio     Subjective:  "History of Present Illness     Presents today for follow-up visit. Accompanied by mother.     Family had viral illness over the holidays and missed a planned trip.     Feeling better now.     Doing well with anastrozole.    Has been on verzenio for about 4 months- diarrhea continues to be an issue. Dairy free diet helps however she still has episodes despite diet changes. She does not want to take imodium often.     Denies weight loss, changes in the breast and/or chest wall, new aches or pains, changes in appetite or energy level.    Gynecologic History:      Age of first menses: 12 years old  Age of last menses: 52 years old  ; FLB at age 28  Post-menopausal  She did not breastfeed  Uterus/Ovaries: Intact  OCP: 20 years     Pertinent Family history:     Breast Cancer:  Maternal aunt, maternal great aunt  Ovarian Cancer: None  Pancreatic Cancer: None  Other:  Paternal grandmother with colon cancer; father with prostate cancer and skin cancer     Social History  Bisi Reynaga \"Tanya\"  reports that she has never smoked. She has never been exposed to tobacco smoke. She has never used smokeless tobacco.  She  reports current alcohol use.  She  reports no history of drug use.     ROS:      Review of Systems   All other systems reviewed and are negative.        Physical Examination:     Vitals:    25 0934   BP: 126/84   Pulse: 74   SpO2: 100%     Vitals:    25 0934   Weight: 73.9 kg (163 lb 0.5 oz)           Physical Exam  Vitals and nursing note reviewed.   Constitutional:       General: She is not in acute distress.     Appearance: Normal appearance. She is not toxic-appearing.   HENT:      Head: Normocephalic and atraumatic.   Eyes:      Conjunctiva/sclera: Conjunctivae normal.   Cardiovascular:      Rate and Rhythm: Normal rate.   Pulmonary:      Effort: Pulmonary effort is normal. No respiratory distress.   Abdominal:      General: Abdomen is flat.      Palpations: Abdomen is soft.   Musculoskeletal: "         General: No swelling. Normal range of motion.      Cervical back: Normal range of motion.   Skin:     General: Skin is warm and dry.   Neurological:      General: No focal deficit present.      Mental Status: She is alert.   Psychiatric:         Mood and Affect: Mood normal.      Breast Examination:   Left breast: No masses, skin or nipple changes  Right breast: No masses, skin or nipple changes  Axillary: No significant examination findings     ECOG Performance Status:      [x] 0 Fully active, able to carry on all pre-disease performance without restriction  [] 1 Restricted in physically strenuous activity but ambulatory and able to carry out work of a light or sedentary nature, e.g., light house work, office work  [] 2 Ambulatory and capable of all selfcare but unable to carry out any work activities; up and about more than 50% of waking hours  [] 3 Capable of only limited selfcare; confined to bed or chair more than 50% of waking hours  [] 4 Completely disabled; cannot carry on any selfcare; totally confined to bed or chair  [] 5 Dead      Results:     Labs:  10/10/24 C1D14 labs reviewed     Imaging:  Reviewed above in Onc History     Pathology:  Reviewed above in Onc History     Assessment:      Pathologic prognostic stage IIA (pT2 pN1 cM0) infiltrating ductal carcinoma of the left breast; Dx in 12/2023; Grade 3; ER positive (>95%), NE positive (10%), HER2 negative (2+, neg GIOVANI); Oncotype DX 34 ; S/p L PM and SLNBx; S/p TC x4; S/p radiation; On anastrozole and verzenio.      Will dose reduce verzenio as she continues to have diarrhea after 4 months of therapy. Reduced to 100 mg BID.       Plan:     Surgical Plan: S/p L PM with SNLBx  Additional biopsy: No further biopsy indicated  Radiation Plan: Completed  Additional staging scans/DEXA/echo: Staging scans not indicated based on current stage, patient history and physical examination.  Baseline DEXA scan normal  Additional Path info (i.e Ki67, PDL1): Not  indicated  Gene assays: Oncotype DX 34     Systemic treatment plan: Anastrozole and Verzenio. Verzenio does reduced to 100 mg BID.              Intent: Curative              Clinical trial: Not eligible for our current trials              Endocrine therapy:  Anastrozole              HER2 treatment: not indicated              Targeted agents:  Verzenio              Chemotherapy: TCx4              BMA: We discussed the role of zoledronic acid as adjuvant therapy in post-menopausal (natural or induced) women with breast cancer who are on systemic therapy. She is still in need of dental work prior to starting.      Access: Not indicated  Supportive meds: None prescribed this visit  Genetic testing: Completed; negative  Fertility preservation: Not indicated  Other active problems/orders:      We spent a portion of our encounter discussing lifestyle modifications that may help with cancer outcomes and overall wellbeing. We discussed regular exercise aiming for 30 minutes 5 times a week. We discussed diet modifications such as limiting red meat and processed foods. We also discussed limiting alcohol intake.     Surveillance plan: Continue yearly mammogram; monthly labs while on verzenio     Follow-up: 3 months     Patient expressed understanding of the plan outlined above. She had ample time to ask questions. She understands she can contact us should she have additional questions or issues arise in the interim.

## 2025-01-16 NOTE — PROGRESS NOTES
Continue taking Anastrozole and Verzenio. Verzenio was decreased to 100mg twice daily. Check PA status 1/23

## 2025-01-28 ENCOUNTER — ONCOLOGY MEDICATION OUTREACH (OUTPATIENT)
Dept: HEMATOLOGY/ONCOLOGY | Facility: CLINIC | Age: 55
End: 2025-01-28
Payer: COMMERCIAL

## 2025-02-03 NOTE — ADDENDUM NOTE
Encounter addended by: Leila Moreno, RRT on: 2/3/2025 2:47 PM   Actions taken: Imaging Exam ended, Charge Capture section accepted

## 2025-02-04 ENCOUNTER — ONCOLOGY MEDICATION OUTREACH (OUTPATIENT)
Dept: HEMATOLOGY/ONCOLOGY | Facility: CLINIC | Age: 55
End: 2025-02-04
Payer: COMMERCIAL

## 2025-02-10 NOTE — PROGRESS NOTES
"Bisi Reynaga female   1970 55 y.o.   [id number]      Chief Complaint  Annual mammogram and exam, history of left breast cancer    History Of Present Illness  Bisi Reynaga \"Marlon" is a very pleasant 55 y.o. female s/p left breast Magseed localized partial mastectomy, left axillary SLNB with dual tracer and Magseed localization on 24 for left breast invasive ductal carcinoma, grade 3, ER + >95%, UT + 10%, HER2 negative. Final pathology demonstrated invasive ductal carcinoma and DCIS, grade 3, 3 cm, negative margins, 1/4 lymph nodes with macrometastasis (10 mm with extranodal extension), ER >95%, UT 10%, HER2 negative. She underwent genetic testing which was negative for deleterious mutations. She had post-operative complications with a left axillary abscess, since resolved. Oncotype score 34. She completed adjuvant chemotherapy on 2024. She completed post-operative radiation on 2024. She started Anastrozole on 2024 following return from vacation. She is also on Verzenio. She presents today for annual mammogram and exam. She denies any new masses or lumps.   Stage IIA [id number]    REPRODUCTIVE HISTORY: menarche age 12, , first birth age 28, did not breastfeed, OCP's x 20+ years, natural menopause age 52, no HRT, scattered fibroglandular tissue     FAMILY CANCER HISTORY:   Maternal Aunt: Breast cancer, age 40  Maternal Great Aunt: Breast cancer, age 45  Paternal Grandmother: Colon cancer, age 65  Father: Prostate cancer, age 58 and skin cancer, age 60    Surgical History  She has a past surgical history that includes BI US guided breast localization and biopsy left (Left, 2023); Breast lumpectomy (Left, 2024); Finger surgery; Cervical biopsy w/ loop electrode excision; and Breast biopsy (2024).     Social History  She reports that she has never smoked. She has never been exposed to tobacco smoke. She has never used smokeless tobacco. She reports current alcohol use. She reports " that she does not use drugs.    Family History  Family History   Problem Relation Name Age of Onset    Breast cancer Other  40        great maternal aunt    Breast cancer Mother's Sister Keiry 40    Cancer Father Alen     Heart disease Father Alen     Colon cancer Paternal Grandmother Ilana         Allergies  Hydromorphone, Clarithromycin, and Pollen extracts    Medications  Current Outpatient Medications   Medication Instructions    abemaciclib (VERZENIO) 100 mg, oral, 2 times daily, Swallow whole.    anastrozole (ARIMIDEX) 1 mg, oral, Daily, Swallow whole with a drink of water.    benzonatate (TESSALON) 200 mg, oral, 3 times daily PRN, Do not crush or chew.    cetirizine (ZYRTEC) 10 mg    montelukast (SINGULAIR) 10 mg, oral, Daily    multivit-min/ferrous fumarate (MULTI VITAMIN ORAL) Take by mouth.    pantoprazole (PROTONIX) 40 mg, oral, Daily         REVIEW OF SYSTEMS    Constitutional:  Negative for appetite change, fatigue, fever and unexpected weight change.   HENT:  Negative for ear pain, hearing loss, nosebleeds, sore throat and trouble swallowing.    Eyes:  Negative for discharge, itching and visual disturbance.   Respiratory:  Negative for cough, chest tightness and shortness of breath.    Cardiovascular:  Negative for chest pain, palpitations and leg swelling.   Breast: as indicated in HPI  Gastrointestinal:  Negative for abdominal pain, constipation, diarrhea and nausea.   Endocrine: Negative for cold intolerance and heat intolerance.   Genitourinary:  Negative for dysuria, frequency, hematuria, pelvic pain and vaginal bleeding.   Musculoskeletal:  Negative for arthralgias, back pain, gait problem, joint swelling and myalgias.   Skin:  Negative for color change and rash.   Allergic/Immunologic: Negative for environmental allergies and food allergies.   Neurological:  Negative for dizziness, tremors, speech difficulty, weakness, numbness and headaches.   Hematological:  Does not bruise/bleed easily.    Psychiatric/Behavioral:  Negative for agitation, dysphoric mood and sleep disturbance. The patient is not nervous/anxious.         Past Medical History  She has a past medical history of Breast cancer (Multi), antineoplastic chemo (4/2024), Kidney stone, Personal history of irradiation (7/2024), and Personal history of urinary calculi (09/25/2014).     Physical Exam  Patient is alert and oriented x3 and in a relaxed and appropriate mood. Her gait is steady and hand grasps are equal. Sclera is clear. The breasts are nearly symmetrical, large and pendulous. The tissue is soft without palpable abnormalities, discrete nodules or masses. The inframammary fold of right breast is more nodular and grainy upon palpation without abnormality. The left breast has a well-healed partial mastectomy incision in the superior lateral quadrant. The left axilla has a well-healed biopsy incision. The skin and nipples appear normal. There is no cervical, supraclavicular or axillary lymphadenopathy. Heart rate and rhythm normal, S1 and S2 appreciated. The lungs are clear to auscultation bilaterally. Abdomen is soft and non-tender.       Physical Exam  Chest:              Last Recorded Vitals  Vitals:    02/27/25 1341   BP: 130/85   Pulse: 64   Temp: 36.7 °C (98 °F)   SpO2: 99%     Relevant Results  Time was spent viewing digital images of the radiology testing with the patient. I explained the results in depth, along with suggested explanation for follow up recommendations based on the testing results. BI-RADS Category 2    Assessment/Plan   Normal clinical exam and imaging, history of left breast cancer, BRCA negative, family history of breast cancer, scattered fibroglandular tissue, Anastrozole therapy    Plan: Return in one year for bilateral screening mammogram and office visit. Continue Anastrozole 1mg daily.    Patient Discussion/Summary  Your clinical examination and imaging are normal. Please return in one year bilateral  screening mammogram and office visit or sooner if you have any problems or concerns. Continue Anastrozole 1mg daily.    You can see your health information, review clinical summaries from office visits & test results online when you follow your health with MY  Chart, a personal health record. To sign up go to [email or web address]. If you need assistance with signing up or trouble getting into your account call [facility] Patient Line 24/7 at [phone].    My office phone number is [phone] if you need to get in touch with me or have additional questions or concerns. Thank you for choosing [facility] and trusting me as your healthcare provider. I look forward to seeing you again at your next office visit. I am honored to be a provider on your health care team and I remain dedicated to helping you achieve your health goals.      Steffi Santiago, APRN-CNP

## 2025-02-18 ENCOUNTER — LAB (OUTPATIENT)
Dept: LAB | Facility: HOSPITAL | Age: 55
End: 2025-02-18
Payer: COMMERCIAL

## 2025-02-18 DIAGNOSIS — C50.412 MALIGNANT NEOPLASM OF UPPER-OUTER QUADRANT OF LEFT FEMALE BREAST: Primary | ICD-10-CM

## 2025-02-18 DIAGNOSIS — Z17.0 MALIGNANT NEOPLASM OF UPPER-OUTER QUADRANT OF LEFT BREAST IN FEMALE, ESTROGEN RECEPTOR POSITIVE: ICD-10-CM

## 2025-02-18 DIAGNOSIS — C50.412 MALIGNANT NEOPLASM OF UPPER-OUTER QUADRANT OF LEFT BREAST IN FEMALE, ESTROGEN RECEPTOR POSITIVE: ICD-10-CM

## 2025-02-18 LAB
ALBUMIN SERPL BCP-MCNC: 4.5 G/DL (ref 3.4–5)
ALP SERPL-CCNC: 50 U/L (ref 33–110)
ALT SERPL W P-5'-P-CCNC: 11 U/L (ref 7–45)
ANION GAP SERPL CALC-SCNC: 12 MMOL/L (ref 10–20)
AST SERPL W P-5'-P-CCNC: 14 U/L (ref 9–39)
BASOPHILS # BLD AUTO: 0.07 X10*3/UL (ref 0–0.1)
BASOPHILS NFR BLD AUTO: 4 %
BILIRUB SERPL-MCNC: 0.5 MG/DL (ref 0–1.2)
BUN SERPL-MCNC: 14 MG/DL (ref 6–23)
CALCIUM SERPL-MCNC: 10 MG/DL (ref 8.6–10.6)
CHLORIDE SERPL-SCNC: 105 MMOL/L (ref 98–107)
CO2 SERPL-SCNC: 30 MMOL/L (ref 21–32)
CREAT SERPL-MCNC: 1.01 MG/DL (ref 0.5–1.05)
EGFRCR SERPLBLD CKD-EPI 2021: 66 ML/MIN/1.73M*2
EOSINOPHIL # BLD AUTO: 0.05 X10*3/UL (ref 0–0.7)
EOSINOPHIL NFR BLD AUTO: 2.8 %
ERYTHROCYTE [DISTWIDTH] IN BLOOD BY AUTOMATED COUNT: 13.3 % (ref 11.5–14.5)
GLUCOSE SERPL-MCNC: 86 MG/DL (ref 74–99)
HCT VFR BLD AUTO: 36.2 % (ref 36–46)
HGB BLD-MCNC: 12.4 G/DL (ref 12–16)
IMM GRANULOCYTES # BLD AUTO: 0 X10*3/UL (ref 0–0.7)
IMM GRANULOCYTES NFR BLD AUTO: 0 % (ref 0–0.9)
LYMPHOCYTES # BLD AUTO: 0.73 X10*3/UL (ref 1.2–4.8)
LYMPHOCYTES NFR BLD AUTO: 41.2 %
MCH RBC QN AUTO: 34 PG (ref 26–34)
MCHC RBC AUTO-ENTMCNC: 34.3 G/DL (ref 32–36)
MCV RBC AUTO: 99 FL (ref 80–100)
MONOCYTES # BLD AUTO: 0.18 X10*3/UL (ref 0.1–1)
MONOCYTES NFR BLD AUTO: 10.2 %
NEUTROPHILS # BLD AUTO: 0.74 X10*3/UL (ref 1.2–7.7)
NEUTROPHILS NFR BLD AUTO: 41.8 %
NRBC BLD-RTO: 0 /100 WBCS (ref 0–0)
PLATELET # BLD AUTO: 204 X10*3/UL (ref 150–450)
POTASSIUM SERPL-SCNC: 4.1 MMOL/L (ref 3.5–5.3)
PROT SERPL-MCNC: 6.7 G/DL (ref 6.4–8.2)
RBC # BLD AUTO: 3.65 X10*6/UL (ref 4–5.2)
RBC MORPH BLD: NORMAL
SODIUM SERPL-SCNC: 143 MMOL/L (ref 136–145)
WBC # BLD AUTO: 1.8 X10*3/UL (ref 4.4–11.3)

## 2025-02-18 PROCEDURE — 80053 COMPREHEN METABOLIC PANEL: CPT

## 2025-02-18 PROCEDURE — 85025 COMPLETE CBC W/AUTO DIFF WBC: CPT

## 2025-02-27 ENCOUNTER — HOSPITAL ENCOUNTER (OUTPATIENT)
Dept: RADIOLOGY | Facility: CLINIC | Age: 55
Discharge: HOME | End: 2025-02-27
Payer: COMMERCIAL

## 2025-02-27 ENCOUNTER — OFFICE VISIT (OUTPATIENT)
Dept: SURGICAL ONCOLOGY | Facility: CLINIC | Age: 55
End: 2025-02-27
Payer: COMMERCIAL

## 2025-02-27 VITALS — BODY MASS INDEX: 24.13 KG/M2 | WEIGHT: 162.92 LBS | HEIGHT: 69 IN

## 2025-02-27 VITALS
DIASTOLIC BLOOD PRESSURE: 85 MMHG | HEART RATE: 64 BPM | OXYGEN SATURATION: 99 % | WEIGHT: 155 LBS | BODY MASS INDEX: 22.88 KG/M2 | TEMPERATURE: 98 F | SYSTOLIC BLOOD PRESSURE: 130 MMHG

## 2025-02-27 DIAGNOSIS — Z79.811 USE OF ANASTROZOLE: Primary | ICD-10-CM

## 2025-02-27 DIAGNOSIS — Z85.3 ENCOUNTER FOR FOLLOW-UP SURVEILLANCE OF BREAST CANCER: ICD-10-CM

## 2025-02-27 DIAGNOSIS — Z08 ENCOUNTER FOR FOLLOW-UP SURVEILLANCE OF BREAST CANCER: ICD-10-CM

## 2025-02-27 PROCEDURE — 77066 DX MAMMO INCL CAD BI: CPT

## 2025-02-27 PROCEDURE — 3079F DIAST BP 80-89 MM HG: CPT | Performed by: NURSE PRACTITIONER

## 2025-02-27 PROCEDURE — 99213 OFFICE O/P EST LOW 20 MIN: CPT | Performed by: NURSE PRACTITIONER

## 2025-02-27 PROCEDURE — 1036F TOBACCO NON-USER: CPT | Performed by: NURSE PRACTITIONER

## 2025-02-27 PROCEDURE — 3075F SYST BP GE 130 - 139MM HG: CPT | Performed by: NURSE PRACTITIONER

## 2025-02-27 ASSESSMENT — PATIENT HEALTH QUESTIONNAIRE - PHQ9
SUM OF ALL RESPONSES TO PHQ9 QUESTIONS 1 & 2: 0
2. FEELING DOWN, DEPRESSED OR HOPELESS: NOT AT ALL
1. LITTLE INTEREST OR PLEASURE IN DOING THINGS: NOT AT ALL

## 2025-02-27 ASSESSMENT — PAIN SCALES - GENERAL: PAINLEVEL_OUTOF10: 0-NO PAIN

## 2025-02-27 NOTE — PATIENT INSTRUCTIONS
Your clinical examination and imaging are normal. Please return in one year bilateral screening mammogram and office visit or sooner if you have any problems or concerns. Continue Anastrozole 1mg daily.    You can see your health information, review clinical summaries from office visits & test results online when you follow your health with MY  Chart, a personal health record. To sign up go to [email or web address]. If you need assistance with signing up or trouble getting into your account call [facility] Patient Line 24/7 at [phone].    My office phone number is [phone] if you need to get in touch with me or have additional questions or concerns. Thank you for choosing [facility] and trusting me as your healthcare provider. I look forward to seeing you again at your next office visit. I am honored to be a provider on your health care team and I remain dedicated to helping you achieve your health goals.

## 2025-03-08 DIAGNOSIS — J30.89 NON-SEASONAL ALLERGIC RHINITIS, UNSPECIFIED TRIGGER: ICD-10-CM

## 2025-03-12 RX ORDER — MONTELUKAST SODIUM 10 MG/1
10 TABLET ORAL DAILY
Qty: 90 TABLET | Refills: 1 | Status: SHIPPED | OUTPATIENT
Start: 2025-03-12

## 2025-03-18 ENCOUNTER — ONCOLOGY MEDICATION OUTREACH (OUTPATIENT)
Dept: HEMATOLOGY/ONCOLOGY | Facility: CLINIC | Age: 55
End: 2025-03-18
Payer: COMMERCIAL

## 2025-03-18 DIAGNOSIS — C50.412 MALIGNANT NEOPLASM OF UPPER-OUTER QUADRANT OF LEFT BREAST IN FEMALE, ESTROGEN RECEPTOR POSITIVE: ICD-10-CM

## 2025-03-18 DIAGNOSIS — Z17.0 MALIGNANT NEOPLASM OF UPPER-OUTER QUADRANT OF LEFT BREAST IN FEMALE, ESTROGEN RECEPTOR POSITIVE: ICD-10-CM

## 2025-03-18 RX ORDER — ANASTROZOLE 1 MG/1
1 TABLET ORAL DAILY
Qty: 90 TABLET | Refills: 3 | Status: SHIPPED | OUTPATIENT
Start: 2025-03-18 | End: 2026-03-18

## 2025-03-18 NOTE — TELEPHONE ENCOUNTER
Bisi Reynaga called the refill line for Anastrozole. Medication pended to team to approve and submit. Next FUV is 4/17.

## 2025-04-04 ENCOUNTER — OFFICE VISIT (OUTPATIENT)
Dept: URGENT CARE | Age: 55
End: 2025-04-04
Payer: COMMERCIAL

## 2025-04-04 VITALS
DIASTOLIC BLOOD PRESSURE: 88 MMHG | OXYGEN SATURATION: 98 % | SYSTOLIC BLOOD PRESSURE: 124 MMHG | RESPIRATION RATE: 16 BRPM | TEMPERATURE: 97.6 F | HEART RATE: 52 BPM

## 2025-04-04 DIAGNOSIS — R30.0 DYSURIA: ICD-10-CM

## 2025-04-04 LAB
POC APPEARANCE, URINE: CLEAR
POC BILIRUBIN, URINE: NEGATIVE
POC BLOOD, URINE: NEGATIVE
POC COLOR, URINE: YELLOW
POC GLUCOSE, URINE: NEGATIVE MG/DL
POC KETONES, URINE: NEGATIVE MG/DL
POC LEUKOCYTES, URINE: NEGATIVE
POC NITRITE,URINE: NEGATIVE
POC PH, URINE: 6 PH
POC PROTEIN, URINE: NEGATIVE MG/DL
POC SPECIFIC GRAVITY, URINE: 1.02
POC UROBILINOGEN, URINE: 0.2 EU/DL

## 2025-04-04 PROCEDURE — 99213 OFFICE O/P EST LOW 20 MIN: CPT | Performed by: STUDENT IN AN ORGANIZED HEALTH CARE EDUCATION/TRAINING PROGRAM

## 2025-04-04 PROCEDURE — 3079F DIAST BP 80-89 MM HG: CPT | Performed by: STUDENT IN AN ORGANIZED HEALTH CARE EDUCATION/TRAINING PROGRAM

## 2025-04-04 PROCEDURE — 3074F SYST BP LT 130 MM HG: CPT | Performed by: STUDENT IN AN ORGANIZED HEALTH CARE EDUCATION/TRAINING PROGRAM

## 2025-04-04 PROCEDURE — 1036F TOBACCO NON-USER: CPT | Performed by: STUDENT IN AN ORGANIZED HEALTH CARE EDUCATION/TRAINING PROGRAM

## 2025-04-04 PROCEDURE — 81003 URINALYSIS AUTO W/O SCOPE: CPT | Performed by: STUDENT IN AN ORGANIZED HEALTH CARE EDUCATION/TRAINING PROGRAM

## 2025-04-04 RX ORDER — NITROFURANTOIN 25; 75 MG/1; MG/1
100 CAPSULE ORAL 2 TIMES DAILY
Qty: 14 CAPSULE | Refills: 0 | Status: SHIPPED | OUTPATIENT
Start: 2025-04-04 | End: 2025-04-11

## 2025-04-04 RX ORDER — NITROFURANTOIN 25; 75 MG/1; MG/1
100 CAPSULE ORAL 2 TIMES DAILY
Qty: 14 CAPSULE | Refills: 0 | Status: SHIPPED | OUTPATIENT
Start: 2025-04-04 | End: 2025-04-04

## 2025-04-04 ASSESSMENT — ENCOUNTER SYMPTOMS
DIFFICULTY URINATING: 1
DYSURIA: 0
HEMATURIA: 0
FREQUENCY: 1

## 2025-04-04 ASSESSMENT — PAIN SCALES - GENERAL: PAINLEVEL_OUTOF10: 0-NO PAIN

## 2025-04-04 NOTE — PROGRESS NOTES
"Subjective   Patient ID: Bisi Reynaga \"Marlon" is a 55 y.o. female. They present today with a chief complaint of Female Dysuria (Low abd pressure with urinary frequency X 2 days. Pt states she has a PMHX of kidney stones, and recently passed one on 03/29/24. Pt is now concerned about a UTI. ).    History of Present Illness    Female Dysuria    Patient presents to urgent care for a chief complaint of concern of  Past Medical History  Allergies as of 04/04/2025 - Reviewed 04/04/2025   Allergen Reaction Noted    Hydromorphone Nausea Only and Other 01/13/2023    Clarithromycin GI Upset 11/08/2021    Pollen extracts Other 02/28/2024       (Not in a hospital admission)       Past Medical History:   Diagnosis Date    Breast cancer     Hx antineoplastic chemo 4/2024    Kidney stone     Personal history of irradiation 7/2024    Personal history of urinary calculi 09/25/2014    Personal history of renal calculi       Past Surgical History:   Procedure Laterality Date    BI US GUIDED BREAST LOCALIZATION AND BIOPSY LEFT Left 12/29/2023    BI US GUIDED BREAST LOCALIZATION AND BIOPSY LEFT 12/29/2023 Adriana So MD [facility]    BREAST BIOPSY  1/2024    BREAST LUMPECTOMY Left 02/07/2024    CERVICAL BIOPSY  W/ LOOP ELECTRODE EXCISION      FINGER SURGERY          reports that she has never smoked. She has never been exposed to tobacco smoke. She has never used smokeless tobacco. She reports current alcohol use. She reports that she does not use drugs.    Review of Systems  Review of Systems   Genitourinary:  Positive for difficulty urinating, frequency and urgency. Negative for dysuria, hematuria, pelvic pain, vaginal bleeding and vaginal discharge.                                  Objective    Vitals:    04/04/25 1157   BP: 124/88   Pulse: 52   Resp: 16   Temp: 36.4 °C (97.6 °F)   SpO2: 98%     No LMP recorded (lmp unknown). Patient is postmenopausal.    Physical Exam  Vitals and nursing note reviewed.   Constitutional:       " General: She is not in acute distress.     Appearance: Normal appearance. She is not ill-appearing, toxic-appearing or diaphoretic.   Abdominal:      Tenderness: There is no right CVA tenderness or left CVA tenderness.   Neurological:      General: No focal deficit present.      Mental Status: She is alert and oriented to person, place, and time.   Psychiatric:         Mood and Affect: Mood normal.         Behavior: Behavior normal.         Procedures    Point of Care Test & Imaging Results from this visit  Results for orders placed or performed in visit on 04/04/25   POCT UA Automated manually resulted   Result Value Ref Range    POC Color, Urine Yellow Straw, Yellow, Light-Yellow    POC Appearance, Urine Clear Clear    POC Glucose, Urine NEGATIVE NEGATIVE mg/dl    POC Bilirubin, Urine NEGATIVE NEGATIVE    POC Ketones, Urine NEGATIVE NEGATIVE mg/dl    POC Specific Gravity, Urine 1.020 1.005 - 1.035    POC Blood, Urine NEGATIVE NEGATIVE    POC PH, Urine 6.0 No Reference Range Established PH    POC Protein, Urine NEGATIVE NEGATIVE mg/dl    POC Urobilinogen, Urine 0.2 0.2, 1.0 EU/DL    Poc Nitrite, Urine NEGATIVE NEGATIVE    POC Leukocytes, Urine NEGATIVE NEGATIVE      Imaging  No results found.    Cardiology, Vascular, and Other Imaging  No other imaging results found for the past 2 days      Diagnostic study results (if any) were reviewed by Mauricio Bell PA-C.    Assessment/Plan   Allergies, medications, history, and pertinent labs/EKGs/Imaging reviewed by Mauricio Bell PA-C.     Medical Decision Making  I did discuss with patient that urinalysis not truly indicative of urinary tract infection but based on patient's symptomology will place patient on Macrobid, urine will be sent for urine culture and sensitivity I did discuss with patient that antibiotic regimen may change pending results, patient was advised if worsening symptoms such as severe abdominal pain back pain fevers chills vomiting or diarrhea  patient to go to emergency room patient verbalized understanding agree with plan discharge emergent care A+O x 4 stable condition no signs of distress    Orders and Diagnoses  Diagnoses and all orders for this visit:  Dysuria  -     POCT UA Automated manually resulted      Medical Admin Record      Patient disposition: Home    Electronically signed by Mauricio Bell PA-C  12:00 PM

## 2025-04-06 LAB — BACTERIA UR CULT: NORMAL

## 2025-04-10 ENCOUNTER — TELEPHONE (OUTPATIENT)
Dept: ADMISSION | Facility: HOSPITAL | Age: 55
End: 2025-04-10
Payer: COMMERCIAL

## 2025-04-10 DIAGNOSIS — C50.412 MALIGNANT NEOPLASM OF UPPER-OUTER QUADRANT OF LEFT BREAST IN FEMALE, ESTROGEN RECEPTOR POSITIVE: ICD-10-CM

## 2025-04-10 DIAGNOSIS — Z17.0 MALIGNANT NEOPLASM OF UPPER-OUTER QUADRANT OF LEFT BREAST IN FEMALE, ESTROGEN RECEPTOR POSITIVE: ICD-10-CM

## 2025-04-10 NOTE — TELEPHONE ENCOUNTER
[facility] called the refill line requesting refills on patient's Verzenio.Next FUV 4/17.

## 2025-04-11 ENCOUNTER — LAB (OUTPATIENT)
Dept: LAB | Facility: HOSPITAL | Age: 55
End: 2025-04-11
Payer: COMMERCIAL

## 2025-04-11 DIAGNOSIS — Z17.0 MALIGNANT NEOPLASM OF UPPER-OUTER QUADRANT OF LEFT BREAST IN FEMALE, ESTROGEN RECEPTOR POSITIVE: ICD-10-CM

## 2025-04-11 DIAGNOSIS — C50.412 MALIGNANT NEOPLASM OF UPPER-OUTER QUADRANT OF LEFT FEMALE BREAST: Primary | ICD-10-CM

## 2025-04-11 DIAGNOSIS — C50.412 MALIGNANT NEOPLASM OF UPPER-OUTER QUADRANT OF LEFT BREAST IN FEMALE, ESTROGEN RECEPTOR POSITIVE: ICD-10-CM

## 2025-04-11 LAB
BASOPHILS # BLD AUTO: 0.05 X10*3/UL (ref 0–0.1)
BASOPHILS NFR BLD AUTO: 1.7 %
EOSINOPHIL # BLD AUTO: 0.07 X10*3/UL (ref 0–0.7)
EOSINOPHIL NFR BLD AUTO: 2.3 %
ERYTHROCYTE [DISTWIDTH] IN BLOOD BY AUTOMATED COUNT: 13 % (ref 11.5–14.5)
HCT VFR BLD AUTO: 41.6 % (ref 36–46)
HGB BLD-MCNC: 13.2 G/DL (ref 12–16)
IMM GRANULOCYTES # BLD AUTO: 0 X10*3/UL (ref 0–0.7)
IMM GRANULOCYTES NFR BLD AUTO: 0 % (ref 0–0.9)
LYMPHOCYTES # BLD AUTO: 0.96 X10*3/UL (ref 1.2–4.8)
LYMPHOCYTES NFR BLD AUTO: 31.8 %
MCH RBC QN AUTO: 33.4 PG (ref 26–34)
MCHC RBC AUTO-ENTMCNC: 31.7 G/DL (ref 32–36)
MCV RBC AUTO: 105 FL (ref 80–100)
MONOCYTES # BLD AUTO: 0.26 X10*3/UL (ref 0.1–1)
MONOCYTES NFR BLD AUTO: 8.6 %
NEUTROPHILS # BLD AUTO: 1.68 X10*3/UL (ref 1.2–7.7)
NEUTROPHILS NFR BLD AUTO: 55.6 %
NRBC BLD-RTO: 0 /100 WBCS (ref 0–0)
PLATELET # BLD AUTO: 232 X10*3/UL (ref 150–450)
RBC # BLD AUTO: 3.95 X10*6/UL (ref 4–5.2)
WBC # BLD AUTO: 3 X10*3/UL (ref 4.4–11.3)

## 2025-04-11 PROCEDURE — 80053 COMPREHEN METABOLIC PANEL: CPT

## 2025-04-11 PROCEDURE — 36415 COLL VENOUS BLD VENIPUNCTURE: CPT

## 2025-04-11 PROCEDURE — 85025 COMPLETE CBC W/AUTO DIFF WBC: CPT

## 2025-04-12 LAB
ALBUMIN SERPL BCP-MCNC: 4.4 G/DL (ref 3.4–5)
ALP SERPL-CCNC: 79 U/L (ref 33–110)
ALT SERPL W P-5'-P-CCNC: 21 U/L (ref 7–45)
ANION GAP SERPL CALC-SCNC: 14 MMOL/L (ref 10–20)
AST SERPL W P-5'-P-CCNC: 18 U/L (ref 9–39)
BILIRUB SERPL-MCNC: 0.4 MG/DL (ref 0–1.2)
BUN SERPL-MCNC: 17 MG/DL (ref 6–23)
CALCIUM SERPL-MCNC: 10 MG/DL (ref 8.6–10.6)
CHLORIDE SERPL-SCNC: 104 MMOL/L (ref 98–107)
CO2 SERPL-SCNC: 29 MMOL/L (ref 21–32)
CREAT SERPL-MCNC: 1.21 MG/DL (ref 0.5–1.05)
EGFRCR SERPLBLD CKD-EPI 2021: 53 ML/MIN/1.73M*2
GLUCOSE SERPL-MCNC: 85 MG/DL (ref 74–99)
POTASSIUM SERPL-SCNC: 3.8 MMOL/L (ref 3.5–5.3)
PROT SERPL-MCNC: 6.7 G/DL (ref 6.4–8.2)
SODIUM SERPL-SCNC: 143 MMOL/L (ref 136–145)

## 2025-04-15 DIAGNOSIS — Z17.0 MALIGNANT NEOPLASM OF UPPER-OUTER QUADRANT OF LEFT BREAST IN FEMALE, ESTROGEN RECEPTOR POSITIVE: Primary | ICD-10-CM

## 2025-04-15 DIAGNOSIS — C50.412 MALIGNANT NEOPLASM OF UPPER-OUTER QUADRANT OF LEFT BREAST IN FEMALE, ESTROGEN RECEPTOR POSITIVE: Primary | ICD-10-CM

## 2025-04-17 ENCOUNTER — ONCOLOGY MEDICATION OUTREACH (OUTPATIENT)
Dept: HEMATOLOGY/ONCOLOGY | Facility: CLINIC | Age: 55
End: 2025-04-17

## 2025-04-17 ENCOUNTER — OFFICE VISIT (OUTPATIENT)
Dept: HEMATOLOGY/ONCOLOGY | Facility: CLINIC | Age: 55
End: 2025-04-17
Payer: COMMERCIAL

## 2025-04-17 VITALS
DIASTOLIC BLOOD PRESSURE: 73 MMHG | HEART RATE: 82 BPM | OXYGEN SATURATION: 97 % | BODY MASS INDEX: 23.62 KG/M2 | SYSTOLIC BLOOD PRESSURE: 105 MMHG | WEIGHT: 160.05 LBS | TEMPERATURE: 97.2 F

## 2025-04-17 DIAGNOSIS — Z17.0 MALIGNANT NEOPLASM OF UPPER-OUTER QUADRANT OF LEFT BREAST IN FEMALE, ESTROGEN RECEPTOR POSITIVE: ICD-10-CM

## 2025-04-17 DIAGNOSIS — C50.412 MALIGNANT NEOPLASM OF UPPER-OUTER QUADRANT OF LEFT BREAST IN FEMALE, ESTROGEN RECEPTOR POSITIVE: ICD-10-CM

## 2025-04-17 PROCEDURE — 99215 OFFICE O/P EST HI 40 MIN: CPT | Performed by: STUDENT IN AN ORGANIZED HEALTH CARE EDUCATION/TRAINING PROGRAM

## 2025-04-17 PROCEDURE — 3078F DIAST BP <80 MM HG: CPT | Performed by: STUDENT IN AN ORGANIZED HEALTH CARE EDUCATION/TRAINING PROGRAM

## 2025-04-17 PROCEDURE — 3074F SYST BP LT 130 MM HG: CPT | Performed by: STUDENT IN AN ORGANIZED HEALTH CARE EDUCATION/TRAINING PROGRAM

## 2025-04-17 ASSESSMENT — PAIN SCALES - GENERAL: PAINLEVEL_OUTOF10: 0-NO PAIN

## 2025-04-17 NOTE — PATIENT INSTRUCTIONS
Have repeat CMP blood test on 4/25/25.  Continue monthly labs.  Continue currently medications.  Follow up with Myrtle Herrera CNP in 3 months.  Please call [phone] with questions or concerns.

## 2025-04-17 NOTE — PROGRESS NOTES
Breast Medical Oncology Clinic  Location: [facility]     Visit Type: Follow Up Patient Visit     Oncologic History:     12/21/2023: Screening mammogram: New somewhat spiculated central lateral superior left breast mass and small nodule more anteriorly in the slight lateral left breast.     12/26/2023: Diagnostic breast imaging: Asymmetry in the medial right breast persists on additional imaging.  This is stable since August 2005.  In the left breast there is an irregular high density mass with associated architectural distortion.  An oval circumscribed equal density mass in the central lateral left breast at middle depth persists.  On ultrasound there is no suspicious finding to correspond to the right breast asymmetry.  In the left breast an irregular hypoechoic mass is seen at the 1 o'clock position 8 cm from the nipple measuring 1.8 x 1.2 x 2.0 cm.  At 4:00 3 cm from the nipple there is a cyst.  Targeted ultrasound of the left axilla demonstrates 1 abnormal appearing left axillary lymph node and 4 normal-appearing lymph nodes.     12/29/2023: Left breast mass ultrasound-guided core needle biopsy: Invasive ductal carcinoma, grade 3.  ER positive (greater than 95%) VT positive (10%) HER2 equal focal, not amplified by dual-stephanie.     12/29/2023: Left axillary lymph node biopsy shows metastatic carcinoma     1/29/2024 [facility] BRCA1/2 testing: Negative. Larger hereditary cancer panel also negative.     2/7/2024: Left breast Magseed partial mastectomy with sentinel lymph node biopsy: Pathology yields a single focus of invasive ductal carcinoma, grade 3 measuring 3 cm.  There is high grade DCIS present.  All margins are negative.  1 of 4 lymph nodes examined with macrometastasis.  PT2 pN1a.     3/12/2024: Oncotype DX recurrence score 34     4/12/24: C1D1 adjuvant docetaxel/cyclophosphamide     6/14/2024: Last cycle of TC    7/22/24-8/16/24: Completed radiation    9/22/24: Started anastrozole and verzenio     Subjective:  "History of Present Illness     Presents today for follow-up visit.     Since last seen developed 2 kidney stones. Has long standing history of kidney stones.     Feels lower dose of verzenio is better tolerated. She still occasionally has episodes of diarrhea but manageable.     Denies weight loss, changes in the breast and/or chest wall, new aches or pains, changes in appetite or energy level. No current concerns at this time.    Upcoming trip to [facility].     Gynecologic History:      Age of first menses: 12 years old  Age of last menses: 52 years old  ; FLB at age 28  Post-menopausal  She did not breastfeed  Uterus/Ovaries: Intact  OCP: 20 years     Pertinent Family history:     Breast Cancer:  Maternal aunt, maternal great aunt  Ovarian Cancer: None  Pancreatic Cancer: None  Other:  Paternal grandmother with colon cancer; father with prostate cancer and skin cancer     Social History  Bisi Reynaga \"Tanya\"  reports that she has never smoked. She has never been exposed to tobacco smoke. She has never used smokeless tobacco.  She  reports current alcohol use.  She  reports no history of drug use.     ROS:      Review of Systems   All other systems reviewed and are negative.        Physical Examination:     Vitals:    25 1453   BP: 105/73   Pulse: 82   Temp: 36.2 °C (97.2 °F)   SpO2: 97%       Vitals:    25 1453   Weight: 72.6 kg (160 lb 0.9 oz)             Physical Exam  Vitals and nursing note reviewed.   Constitutional:       General: She is not in acute distress.     Appearance: Normal appearance. She is not toxic-appearing.   HENT:      Head: Normocephalic and atraumatic.   Eyes:      Conjunctiva/sclera: Conjunctivae normal.   Cardiovascular:      Rate and Rhythm: Normal rate.   Pulmonary:      Effort: Pulmonary effort is normal. No respiratory distress.   Musculoskeletal:         General: No swelling. Normal range of motion.      Cervical back: Normal range of motion.   Skin:   "   General: Skin is warm and dry.   Neurological:      General: No focal deficit present.      Mental Status: She is alert.   Psychiatric:         Mood and Affect: Mood normal.      Breast Examination:   Left breast: No masses, skin or nipple changes  Right breast: No masses, skin or nipple changes  Axillary: No significant examination findings     ECOG Performance Status:      [x] 0 Fully active, able to carry on all pre-disease performance without restriction  [] 1 Restricted in physically strenuous activity but ambulatory and able to carry out work of a light or sedentary nature, e.g., light house work, office work  [] 2 Ambulatory and capable of all selfcare but unable to carry out any work activities; up and about more than 50% of waking hours  [] 3 Capable of only limited selfcare; confined to bed or chair more than 50% of waking hours  [] 4 Completely disabled; cannot carry on any selfcare; totally confined to bed or chair  [] 5 Dead      Results:     Labs:  4/11/2025 labs reviewed     Imaging:  Reviewed above in Onc History     Pathology:  Reviewed above in Onc History     Assessment:      Pathologic prognostic stage IIA (pT2 pN1 cM0) infiltrating ductal carcinoma of the left breast; Dx in 12/2023; Grade 3; ER positive (>95%), W[place] positive (10%), HER2 negative (2+, neg GIOVANI); Oncotype DX 34 ; S/p L PM and SLNBx; S/p TC x4; S/p radiation; On anastrozole and verzenio.      Tolerating treatment well. No evidence of breast cancer recurrence per patient history, physical examination and imaging findings.      Plan:     Surgical Plan: S/p L PM with SNLBx  Additional biopsy: No further biopsy indicated  Radiation Plan: Completed  Additional staging scans/DEXA/echo: Staging scans not indicated based on current stage, patient history and physical examination.  Baseline DEXA scan normal  Additional Path info (i.e Ki67, PDL1): Not indicated  Gene assays: Oncotype DX 34     Systemic treatment plan: Anastrozole and  Verzenio. Verzenio dose reduced to 100 mg BID.              Intent: Curative              Clinical trial: Not eligible for our current trials              Endocrine therapy:  Anastrozole              HER2 treatment: not indicated              Targeted agents:  Verzenio              Chemotherapy: TCx4              BMA: We discussed the role of zoledronic acid as adjuvant therapy in post-menopausal (natural or induced) women with breast cancer who are on systemic therapy. She has declined at this time.      Access: Not indicated  Supportive meds: None prescribed this visit  Genetic testing: Completed; negative  Fertility preservation: Not indicated  Other active problems/orders:      AMY: Likely related to recent kidney stones. Repeat CMP planned. Orders in place.   We spent a portion of our encounter discussing lifestyle modifications that may help with cancer outcomes and overall wellbeing. We discussed regular exercise aiming for 30 minutes 5 times a week. We discussed diet modifications such as limiting red meat and processed foods. We also discussed limiting alcohol intake.     Surveillance plan: Continue yearly mammogram; monthly labs while on verzenio     Follow-up: 3 months Myrtle Herrera NP for verzenio toxicity check. 6 months MD.      Patient expressed understanding of the plan outlined above. She had ample time to ask questions. She understands she can contact us should she have additional questions or issues arise in the interim.

## 2025-04-25 ENCOUNTER — HOSPITAL ENCOUNTER (OUTPATIENT)
Dept: RADIATION ONCOLOGY | Facility: CLINIC | Age: 55
Setting detail: RADIATION/ONCOLOGY SERIES
Discharge: HOME | End: 2025-04-25
Payer: COMMERCIAL

## 2025-04-25 VITALS
DIASTOLIC BLOOD PRESSURE: 83 MMHG | HEART RATE: 73 BPM | RESPIRATION RATE: 18 BRPM | TEMPERATURE: 97.7 F | BODY MASS INDEX: 23.5 KG/M2 | WEIGHT: 159.2 LBS | SYSTOLIC BLOOD PRESSURE: 147 MMHG | OXYGEN SATURATION: 98 %

## 2025-04-25 DIAGNOSIS — C50.412 MALIGNANT NEOPLASM OF UPPER-OUTER QUADRANT OF LEFT BREAST IN FEMALE, ESTROGEN RECEPTOR POSITIVE: ICD-10-CM

## 2025-04-25 DIAGNOSIS — Z90.12 STATUS POST PARTIAL MASTECTOMY OF LEFT BREAST: Primary | ICD-10-CM

## 2025-04-25 DIAGNOSIS — Z17.0 MALIGNANT NEOPLASM OF UPPER-OUTER QUADRANT OF LEFT BREAST IN FEMALE, ESTROGEN RECEPTOR POSITIVE: ICD-10-CM

## 2025-04-25 PROCEDURE — 99213 OFFICE O/P EST LOW 20 MIN: CPT | Performed by: NURSE PRACTITIONER

## 2025-04-25 ASSESSMENT — PAIN SCALES - GENERAL: PAINLEVEL_OUTOF10: 0-NO PAIN

## 2025-04-25 NOTE — PROGRESS NOTES
Radiation Oncology Follow-Up    Patient Name:  Bisi Reynaga  MRN:  [id number]  :  1970    Referring Provider: Jana Frye APR*  Primary Care Provider: Tre Raymond DO  Care Team: Patient Care Team:  Tre Raymond DO as PCP - General  DOMENICO Horn-CNP as PCP - Maureen FREEMAN PCP  Silvia Bhatia MD as Consulting Physician (Hematology and Oncology)    Date of Service: 2025   55 y.o. female with Malignant neoplasm of upper-outer quadrant of left breast in female, estrogen receptor positive (Multi), Clinical: Stage IIA (cT1c, cN1, cM0, G3, ER+, MT+, HER2-)  Malignant neoplasm of upper-outer quadrant of left breast in female, estrogen receptor positive (Multi), Pathologic: Stage IIA (pT2, pN1a(sn), cM0, G3, ER+, MT+, HER2-).  The patient completed radiotherapy as outlined below.     Oncologic History:     2023: Screening mammogram: New somewhat spiculated central lateral superior left breast mass and small nodule more anteriorly in the slight lateral left breast.     2023: Diagnostic breast imaging: Asymmetry in the medial right breast persists on additional imaging.  This is stable since 2005.  In the left breast there is an irregular high density mass with associated architectural distortion.  An oval circumscribed equal density mass in the central lateral left breast at middle depth persists.  On ultrasound there is no suspicious finding to correspond to the right breast asymmetry.  In the left breast an irregular hypoechoic mass is seen at the 1 o'clock position 8 cm from the nipple measuring 1.8 x 1.2 x 2.0 cm.  At 4:00 3 cm from the nipple there is a cyst.  Targeted ultrasound of the left axilla demonstrates 1 abnormal appearing left axillary lymph node and 4 normal-appearing lymph nodes.     2023: Left breast mass ultrasound-guided core needle biopsy: Invasive ductal carcinoma, grade 3.  ER positive (greater than 95%) MT positive (10%) HER2 equal  "focal, not amplified by dual-stephanie.     12/29/2023: Left axillary lymph node biopsy shows metastatic carcinoma     1/29/2024 [facility] BRCA1/2 testing: Negative. Larger hereditary cancer panel also negative.     2/7/2024: Left breast Magseed partial mastectomy with sentinel lymph node biopsy: Pathology yields a single focus of invasive ductal carcinoma, grade 3 measuring 3 cm.  There is high grade DCIS present.  All margins are negative.  1 of 4 lymph nodes examined with macrometastasis.  PT2 pN1a.     3/12/2024: Oncotype DX recurrence score 34     4/12/24: C1D1 adjuvant docetaxel/cyclophosphamide      6/14/2024: Last cycle of TC    8/16/24: Completed radiation:    Radiation Treatment Summary:            Other Treatments     Treatment Period Technique Fraction Dose Fractions Total Dose   Course 1 7/22/2024-8/16/2024  (days elapsed: 25)         Lt breast/low ax 7/22/2024-8/13/2024 3D 266 / 266 cGy 16 / 16 4256 / 4,256 cGy         Lt TB 8/14/2024-8/16/2024 3D 250 / 250 cGy 4 / 4 1000 / 1,000 cGy     9/22/24: started adjuvant endocrine therapy with anastrozole. Started Verzenio    SUBJECTIVE  History of Present Illness:   Bisi Reynaga \"Marlon" is here today for routine radiation follow up/surveillance visit. She says she is doing well overall and energy level returning. Left breast has healed well. Reports skin as intact. She has some pulling/cording sensation left arm and slight limitation in ROM.  No lymphedema reported. Denies headaches, fever, chills, cough, SOB, chest pain, n/v/c/d or bony pain. She continues anastrozole and no adverse effects. Continues Verzenio however was dose reduced to 100 mg BID due to diarrhea. DEXA scan is normal bone density.    Review of Systems:    Review of Systems   All other systems reviewed and are negative.    Performance Status:   The Karnofsky performance scale today is 90, Able to carry on normal activity; minor signs or symptoms of disease (ECOG equivalent 0).      OBJECTIVE  Vital " Signs:  /83   Pulse 73   Temp 36.5 °C (97.7 °F)   Resp 18   Wt 72.2 kg (159 lb 3.2 oz)   LMP  (LMP Unknown)   SpO2 98%   BMI 23.50 kg/m²      Current Outpatient Medications:     abemaciclib (Verzenio) 100 mg tablet, Take 1 tablet (100 mg total) by mouth 2 times a day.  Swallow whole., Disp: 56 tablet, Rfl: 2    anastrozole (Arimidex) 1 mg tablet, Take 1 tablet (1 mg total) by mouth once daily.  Swallow whole with a drink of water., Disp: 90 tablet, Rfl: 3    cetirizine (ZyrTEC) 10 mg tablet, Take 1 tablet (10 mg) by mouth., Disp: , Rfl:     montelukast (Singulair) 10 mg tablet, TAKE ONE TABLET BY MOUTH EVERY DAY, Disp: 90 tablet, Rfl: 1    multivit-min/ferrous fumarate (MULTI VITAMIN ORAL), Take by mouth., Disp: , Rfl:     pantoprazole (ProtoNix) 40 mg EC tablet, Take 1 tablet (40 mg) by mouth once daily., Disp: 90 tablet, Rfl: 3    benzonatate (Tessalon) 200 mg capsule, Take 1 capsule (200 mg) by mouth 3 times a day as needed for cough for up to 30 doses. Do not crush or chew. (Patient not taking: Reported on 4/4/2025), Disp: 30 capsule, Rfl: 0     Physical Exam  Vitals reviewed.   Constitutional:       Appearance: Normal appearance.   HENT:      Head: Normocephalic and atraumatic.      Nose: Nose normal.      Mouth/Throat:      Mouth: Mucous membranes are moist.      Pharynx: Oropharynx is clear.   Eyes:      Conjunctiva/sclera: Conjunctivae normal.      Pupils: Pupils are equal, round, and reactive to light.   Cardiovascular:      Rate and Rhythm: Normal rate and regular rhythm.      Heart sounds: Normal heart sounds.   Pulmonary:      Effort: Pulmonary effort is normal.      Breath sounds: Normal breath sounds.   Chest:   Breasts:     Right: No swelling, inverted nipple, mass, nipple discharge or skin change.      Left: No swelling, inverted nipple, mass, nipple discharge or skin change.       Abdominal:      Palpations: Abdomen is soft.   Musculoskeletal:         General: No swelling. Normal range  of motion.      Cervical back: Normal range of motion and neck supple.   Lymphadenopathy:      Cervical: No cervical adenopathy.   Skin:     General: Skin is warm and dry.   Neurological:      General: No focal deficit present.      Mental Status: She is alert and oriented to person, place, and time.   Psychiatric:         Mood and Affect: Mood normal.         Behavior: Behavior normal.             RESULTS:  Narrative & Impression   Interpreted By:  Bib Hardin,   STUDY:  DEXA BONE DENSITY6/20/2024 9:18 am      INDICATION:  The patient is a 54-year-old female who presents for screening.      COMPARISON:  None.      ACCESSION NUMBER(S):  [id number]      ORDERING CLINICIAN:  EDISON NELSON      TECHNIQUE:  DEXA BONE DENSITY      FINDINGS:  SPINE L1-L4  Bone Mineral Density: 1.271  T-Score 0.8  Z-Score 1.5  Classification:  Normal      LEFT FEMUR -TOTAL  Bone Mineral Density: 1.012  T-Score 0.0   Z-Score  0.7  Classification:  Normal      LEFT FEMUR -NECK  Bone Mineral Density: 1.035  T-Score 0.0  Z-Score 1.0  Classification:  Normal      World Health Organization (WHO) criteria for post-menopausal,   Women:  Normal:         T-score at or above -1 SD  Osteopenia:   T-score between -1 and -2.5 SD  Osteoporosis: T-score at or below -2.5 SD      10-year Fracture Risk:  Major Osteoporotic Fracture  10.1%  Hip Fracture                       0.1%      This exam was performed at [facility] on a [employer] Dexa Unit.      IMPRESSION:  DEXA:  According to World Health Organization criteria,  classification is normal.     Narrative & Impression   Interpreted By:  Triston Redman,   STUDY:  BI MAMMO BILATERAL DIAGNOSTIC TOMOSYNTHESIS;  2/27/2025 1:20 pm      ACCESSION NUMBER(S):  [id number]      ORDERING CLINICIAN:  AR COVARRUBIAS      INDICATION:  Left lumpectomy with chemoradiation therapy. Bilateral annual.      ,Z08 Encounter for follow-up examination after completed  treatment  for malignant neoplasm,Z85.3 Personal history of malignant neoplasm  of breast      COMPARISON:  12/21/2023 and 06/04/2020      FINDINGS:  MAMMOGRAPHY: 2D and tomosynthesis images were reviewed at 1 mm slice  thickness.      Density:  There are scattered areas of fibroglandular density.      New postsurgical scarring is seen the superolateral left breast at  mid to posterior depth and overlying the left axilla. Left breast  skin/trabecular thickening consistent with post radiation changes. No  suspicious masses or calcifications are identified bilaterally.      IMPRESSION:  No mammographic evidence of malignancy.      BI-RADS CATEGORY:      BI-RADS Category:  2 Benign.  Recommendation:  Annual Screening.  Recommended Date:  1 Year.  Laterality:  Bilateral.         ASSESSMENT:  55 y.o. female with stage IIA left breast cancer s/p breast conserving surgery followed by adjuvant chemotherapy followed by radiation.  Cosmesis is excellent.     PLAN:    - Continue anastrozole, Verzenio and FUV with Dr. Bhatia  - Mammogram and FUV with Steffi Santiago CNP  - Radiation follow up in 6 mo.  Call us with any questions or concerns.     Jessica Frye CNP  [phone]

## 2025-05-01 ENCOUNTER — LAB (OUTPATIENT)
Dept: LAB | Facility: HOSPITAL | Age: 55
End: 2025-05-01
Payer: COMMERCIAL

## 2025-05-01 DIAGNOSIS — C50.412 MALIGNANT NEOPLASM OF UPPER-OUTER QUADRANT OF LEFT FEMALE BREAST: Primary | ICD-10-CM

## 2025-05-01 DIAGNOSIS — Z17.0 MALIGNANT NEOPLASM OF UPPER-OUTER QUADRANT OF LEFT BREAST IN FEMALE, ESTROGEN RECEPTOR POSITIVE: ICD-10-CM

## 2025-05-01 DIAGNOSIS — C50.412 MALIGNANT NEOPLASM OF UPPER-OUTER QUADRANT OF LEFT BREAST IN FEMALE, ESTROGEN RECEPTOR POSITIVE: ICD-10-CM

## 2025-05-01 LAB
ALBUMIN SERPL BCP-MCNC: 4.2 G/DL (ref 3.4–5)
ALP SERPL-CCNC: 65 U/L (ref 33–110)
ALT SERPL W P-5'-P-CCNC: 20 U/L (ref 7–45)
ANION GAP SERPL CALC-SCNC: 12 MMOL/L (ref 10–20)
AST SERPL W P-5'-P-CCNC: 17 U/L (ref 9–39)
BASOPHILS # BLD MANUAL: 0.02 X10*3/UL (ref 0–0.1)
BASOPHILS NFR BLD MANUAL: 0.9 %
BILIRUB SERPL-MCNC: 0.6 MG/DL (ref 0–1.2)
BUN SERPL-MCNC: 11 MG/DL (ref 6–23)
CALCIUM SERPL-MCNC: 9.9 MG/DL (ref 8.6–10.6)
CHLORIDE SERPL-SCNC: 104 MMOL/L (ref 98–107)
CO2 SERPL-SCNC: 31 MMOL/L (ref 21–32)
CREAT SERPL-MCNC: 1.03 MG/DL (ref 0.5–1.05)
EGFRCR SERPLBLD CKD-EPI 2021: 64 ML/MIN/1.73M*2
EOSINOPHIL # BLD MANUAL: 0.04 X10*3/UL (ref 0–0.7)
EOSINOPHIL NFR BLD MANUAL: 1.7 %
ERYTHROCYTE [DISTWIDTH] IN BLOOD BY AUTOMATED COUNT: 12.5 % (ref 11.5–14.5)
GLUCOSE SERPL-MCNC: 71 MG/DL (ref 74–99)
HCT VFR BLD AUTO: 37.8 % (ref 36–46)
HGB BLD-MCNC: 12.5 G/DL (ref 12–16)
IMM GRANULOCYTES # BLD AUTO: 0.01 X10*3/UL (ref 0–0.7)
IMM GRANULOCYTES NFR BLD AUTO: 0.5 % (ref 0–0.9)
LYMPHOCYTES # BLD MANUAL: 0.79 X10*3/UL (ref 1.2–4.8)
LYMPHOCYTES NFR BLD MANUAL: 37.4 %
MCH RBC QN AUTO: 32.4 PG (ref 26–34)
MCHC RBC AUTO-ENTMCNC: 33.1 G/DL (ref 32–36)
MCV RBC AUTO: 98 FL (ref 80–100)
MONOCYTES # BLD MANUAL: 0.09 X10*3/UL (ref 0.1–1)
MONOCYTES NFR BLD MANUAL: 4.3 %
NEUTROPHILS # BLD MANUAL: 1.17 X10*3/UL (ref 1.2–7.7)
NEUTS BAND # BLD MANUAL: 0.02 X10*3/UL (ref 0–0.7)
NEUTS BAND NFR BLD MANUAL: 0.9 %
NEUTS SEG # BLD MANUAL: 1.15 X10*3/UL (ref 1.2–7)
NEUTS SEG NFR BLD MANUAL: 54.8 %
NRBC BLD-RTO: 0 /100 WBCS (ref 0–0)
OVALOCYTES BLD QL SMEAR: ABNORMAL
PLATELET # BLD AUTO: 176 X10*3/UL (ref 150–450)
POLYCHROMASIA BLD QL SMEAR: ABNORMAL
POTASSIUM SERPL-SCNC: 4.1 MMOL/L (ref 3.5–5.3)
PROT SERPL-MCNC: 6.4 G/DL (ref 6.4–8.2)
RBC # BLD AUTO: 3.86 X10*6/UL (ref 4–5.2)
RBC MORPH BLD: ABNORMAL
SODIUM SERPL-SCNC: 143 MMOL/L (ref 136–145)
TOTAL CELLS COUNTED BLD: 115
WBC # BLD AUTO: 2.1 X10*3/UL (ref 4.4–11.3)

## 2025-05-01 PROCEDURE — 80053 COMPREHEN METABOLIC PANEL: CPT

## 2025-05-01 PROCEDURE — 36415 COLL VENOUS BLD VENIPUNCTURE: CPT

## 2025-05-01 PROCEDURE — 85027 COMPLETE CBC AUTOMATED: CPT

## 2025-05-01 PROCEDURE — 85007 BL SMEAR W/DIFF WBC COUNT: CPT

## 2025-05-15 ENCOUNTER — ONCOLOGY MEDICATION OUTREACH (OUTPATIENT)
Dept: HEMATOLOGY/ONCOLOGY | Facility: CLINIC | Age: 55
End: 2025-05-15
Payer: COMMERCIAL

## 2025-06-05 PROCEDURE — 80053 COMPREHEN METABOLIC PANEL: CPT

## 2025-06-05 PROCEDURE — 36415 COLL VENOUS BLD VENIPUNCTURE: CPT

## 2025-06-05 PROCEDURE — 85025 COMPLETE CBC W/AUTO DIFF WBC: CPT

## 2025-06-06 ENCOUNTER — LAB (OUTPATIENT)
Dept: LAB | Facility: HOSPITAL | Age: 55
End: 2025-06-06
Payer: COMMERCIAL

## 2025-06-06 DIAGNOSIS — C50.412 MALIGNANT NEOPLASM OF UPPER-OUTER QUADRANT OF LEFT BREAST IN FEMALE, ESTROGEN RECEPTOR POSITIVE: ICD-10-CM

## 2025-06-06 DIAGNOSIS — Z17.0 MALIGNANT NEOPLASM OF UPPER-OUTER QUADRANT OF LEFT BREAST IN FEMALE, ESTROGEN RECEPTOR POSITIVE: ICD-10-CM

## 2025-06-06 DIAGNOSIS — C50.412 MALIGNANT NEOPLASM OF UPPER-OUTER QUADRANT OF LEFT FEMALE BREAST: Primary | ICD-10-CM

## 2025-06-06 LAB
ALBUMIN SERPL BCP-MCNC: 4.6 G/DL (ref 3.4–5)
ALP SERPL-CCNC: 83 U/L (ref 33–110)
ALT SERPL W P-5'-P-CCNC: 31 U/L (ref 7–45)
ANION GAP SERPL CALC-SCNC: 11 MMOL/L (ref 10–20)
AST SERPL W P-5'-P-CCNC: 26 U/L (ref 9–39)
BASOPHILS # BLD AUTO: 0.03 X10*3/UL (ref 0–0.1)
BASOPHILS NFR BLD AUTO: 1.4 %
BILIRUB SERPL-MCNC: 0.5 MG/DL (ref 0–1.2)
BUN SERPL-MCNC: 14 MG/DL (ref 6–23)
CALCIUM SERPL-MCNC: 10.2 MG/DL (ref 8.6–10.6)
CHLORIDE SERPL-SCNC: 104 MMOL/L (ref 98–107)
CO2 SERPL-SCNC: 33 MMOL/L (ref 21–32)
CREAT SERPL-MCNC: 0.96 MG/DL (ref 0.5–1.05)
EGFRCR SERPLBLD CKD-EPI 2021: 70 ML/MIN/1.73M*2
EOSINOPHIL # BLD AUTO: 0.03 X10*3/UL (ref 0–0.7)
EOSINOPHIL NFR BLD AUTO: 1.4 %
ERYTHROCYTE [DISTWIDTH] IN BLOOD BY AUTOMATED COUNT: 13.2 % (ref 11.5–14.5)
GLUCOSE SERPL-MCNC: 71 MG/DL (ref 74–99)
HCT VFR BLD AUTO: 39.8 % (ref 36–46)
HGB BLD-MCNC: 12.9 G/DL (ref 12–16)
IMM GRANULOCYTES # BLD AUTO: 0 X10*3/UL (ref 0–0.7)
IMM GRANULOCYTES NFR BLD AUTO: 0 % (ref 0–0.9)
LYMPHOCYTES # BLD AUTO: 0.73 X10*3/UL (ref 1.2–4.8)
LYMPHOCYTES NFR BLD AUTO: 34.6 %
MCH RBC QN AUTO: 31.9 PG (ref 26–34)
MCHC RBC AUTO-ENTMCNC: 32.4 G/DL (ref 32–36)
MCV RBC AUTO: 99 FL (ref 80–100)
MONOCYTES # BLD AUTO: 0.2 X10*3/UL (ref 0.1–1)
MONOCYTES NFR BLD AUTO: 9.5 %
NEUTROPHILS # BLD AUTO: 1.12 X10*3/UL (ref 1.2–7.7)
NEUTROPHILS NFR BLD AUTO: 53.1 %
NRBC BLD-RTO: 0 /100 WBCS (ref 0–0)
PLATELET # BLD AUTO: 170 X10*3/UL (ref 150–450)
POTASSIUM SERPL-SCNC: 4.6 MMOL/L (ref 3.5–5.3)
PROT SERPL-MCNC: 6.9 G/DL (ref 6.4–8.2)
RBC # BLD AUTO: 4.04 X10*6/UL (ref 4–5.2)
SODIUM SERPL-SCNC: 143 MMOL/L (ref 136–145)
WBC # BLD AUTO: 2.1 X10*3/UL (ref 4.4–11.3)

## 2025-06-19 ENCOUNTER — ONCOLOGY MEDICATION OUTREACH (OUTPATIENT)
Dept: HEMATOLOGY/ONCOLOGY | Facility: CLINIC | Age: 55
End: 2025-06-19
Payer: COMMERCIAL

## 2025-07-08 ENCOUNTER — LAB (OUTPATIENT)
Dept: LAB | Facility: HOSPITAL | Age: 55
End: 2025-07-08
Payer: COMMERCIAL

## 2025-07-08 DIAGNOSIS — C50.412 MALIGNANT NEOPLASM OF UPPER-OUTER QUADRANT OF LEFT FEMALE BREAST: Primary | ICD-10-CM

## 2025-07-08 DIAGNOSIS — Z17.0 MALIGNANT NEOPLASM OF UPPER-OUTER QUADRANT OF LEFT BREAST IN FEMALE, ESTROGEN RECEPTOR POSITIVE: ICD-10-CM

## 2025-07-08 DIAGNOSIS — C50.412 MALIGNANT NEOPLASM OF UPPER-OUTER QUADRANT OF LEFT BREAST IN FEMALE, ESTROGEN RECEPTOR POSITIVE: ICD-10-CM

## 2025-07-08 LAB
ALBUMIN SERPL BCP-MCNC: 4.4 G/DL (ref 3.4–5)
ALP SERPL-CCNC: 82 U/L (ref 33–110)
ALT SERPL W P-5'-P-CCNC: 19 U/L (ref 7–45)
ANION GAP SERPL CALC-SCNC: 10 MMOL/L (ref 10–20)
AST SERPL W P-5'-P-CCNC: 17 U/L (ref 9–39)
BASOPHILS # BLD AUTO: 0.04 X10*3/UL (ref 0–0.1)
BASOPHILS NFR BLD AUTO: 1.8 %
BILIRUB SERPL-MCNC: 0.6 MG/DL (ref 0–1.2)
BUN SERPL-MCNC: 14 MG/DL (ref 6–23)
CALCIUM SERPL-MCNC: 9.9 MG/DL (ref 8.6–10.6)
CHLORIDE SERPL-SCNC: 103 MMOL/L (ref 98–107)
CO2 SERPL-SCNC: 32 MMOL/L (ref 21–32)
CREAT SERPL-MCNC: 0.96 MG/DL (ref 0.5–1.05)
EGFRCR SERPLBLD CKD-EPI 2021: 70 ML/MIN/1.73M*2
EOSINOPHIL # BLD AUTO: 0.02 X10*3/UL (ref 0–0.7)
EOSINOPHIL NFR BLD AUTO: 0.9 %
ERYTHROCYTE [DISTWIDTH] IN BLOOD BY AUTOMATED COUNT: 13.2 % (ref 11.5–14.5)
GLUCOSE SERPL-MCNC: 90 MG/DL (ref 74–99)
HCT VFR BLD AUTO: 39.8 % (ref 36–46)
HGB BLD-MCNC: 13.2 G/DL (ref 12–16)
IMM GRANULOCYTES # BLD AUTO: 0.01 X10*3/UL (ref 0–0.7)
IMM GRANULOCYTES NFR BLD AUTO: 0.4 % (ref 0–0.9)
LYMPHOCYTES # BLD AUTO: 0.84 X10*3/UL (ref 1.2–4.8)
LYMPHOCYTES NFR BLD AUTO: 37.7 %
MCH RBC QN AUTO: 32.4 PG (ref 26–34)
MCHC RBC AUTO-ENTMCNC: 33.2 G/DL (ref 32–36)
MCV RBC AUTO: 98 FL (ref 80–100)
MONOCYTES # BLD AUTO: 0.16 X10*3/UL (ref 0.1–1)
MONOCYTES NFR BLD AUTO: 7.2 %
NEUTROPHILS # BLD AUTO: 1.16 X10*3/UL (ref 1.2–7.7)
NEUTROPHILS NFR BLD AUTO: 52 %
NRBC BLD-RTO: 0 /100 WBCS (ref 0–0)
PLATELET # BLD AUTO: 194 X10*3/UL (ref 150–450)
POTASSIUM SERPL-SCNC: 4.3 MMOL/L (ref 3.5–5.3)
PROT SERPL-MCNC: 6.8 G/DL (ref 6.4–8.2)
RBC # BLD AUTO: 4.08 X10*6/UL (ref 4–5.2)
SODIUM SERPL-SCNC: 141 MMOL/L (ref 136–145)
WBC # BLD AUTO: 2.2 X10*3/UL (ref 4.4–11.3)

## 2025-07-08 PROCEDURE — 80053 COMPREHEN METABOLIC PANEL: CPT

## 2025-07-08 PROCEDURE — 36415 COLL VENOUS BLD VENIPUNCTURE: CPT

## 2025-07-08 PROCEDURE — 85025 COMPLETE CBC W/AUTO DIFF WBC: CPT

## 2025-07-09 DIAGNOSIS — Z17.0 MALIGNANT NEOPLASM OF UPPER-OUTER QUADRANT OF LEFT BREAST IN FEMALE, ESTROGEN RECEPTOR POSITIVE: ICD-10-CM

## 2025-07-09 DIAGNOSIS — C50.412 MALIGNANT NEOPLASM OF UPPER-OUTER QUADRANT OF LEFT BREAST IN FEMALE, ESTROGEN RECEPTOR POSITIVE: ICD-10-CM

## 2025-07-09 NOTE — TELEPHONE ENCOUNTER
Requested Prescriptions     Signed Prescriptions Disp Refills    abemaciclib (Verzenio) 100 mg tablet 56 tablet 2     Sig: Take 1 tablet (100 mg total) by mouth 2 times a day.  Swallow whole.     Authorizing Provider: EDISON NELSON     Ordering User: SATURNINO HUMPHREY

## 2025-07-21 PROBLEM — Z79.811 AROMATASE INHIBITOR USE: Status: ACTIVE | Noted: 2025-07-21

## 2025-07-21 PROBLEM — Z85.3 ENCOUNTER FOR FOLLOW-UP SURVEILLANCE OF BREAST CANCER: Status: ACTIVE | Noted: 2025-07-21

## 2025-07-21 PROBLEM — Z51.11 ENCOUNTER FOR ANTINEOPLASTIC CHEMOTHERAPY: Status: ACTIVE | Noted: 2025-07-21

## 2025-07-21 PROBLEM — Z08 ENCOUNTER FOR FOLLOW-UP SURVEILLANCE OF BREAST CANCER: Status: ACTIVE | Noted: 2025-07-21

## 2025-07-22 ENCOUNTER — OFFICE VISIT (OUTPATIENT)
Dept: HEMATOLOGY/ONCOLOGY | Facility: CLINIC | Age: 55
End: 2025-07-22
Payer: COMMERCIAL

## 2025-07-22 VITALS
OXYGEN SATURATION: 100 % | SYSTOLIC BLOOD PRESSURE: 123 MMHG | WEIGHT: 162.81 LBS | BODY MASS INDEX: 24.03 KG/M2 | TEMPERATURE: 97.5 F | HEART RATE: 77 BPM | DIASTOLIC BLOOD PRESSURE: 76 MMHG

## 2025-07-22 DIAGNOSIS — Z85.3 ENCOUNTER FOR FOLLOW-UP SURVEILLANCE OF BREAST CANCER: ICD-10-CM

## 2025-07-22 DIAGNOSIS — Z17.0 MALIGNANT NEOPLASM OF UPPER-OUTER QUADRANT OF LEFT BREAST IN FEMALE, ESTROGEN RECEPTOR POSITIVE: ICD-10-CM

## 2025-07-22 DIAGNOSIS — Z08 ENCOUNTER FOR FOLLOW-UP SURVEILLANCE OF BREAST CANCER: ICD-10-CM

## 2025-07-22 DIAGNOSIS — Z51.11 ENCOUNTER FOR ANTINEOPLASTIC CHEMOTHERAPY: ICD-10-CM

## 2025-07-22 DIAGNOSIS — Z79.811 AROMATASE INHIBITOR USE: ICD-10-CM

## 2025-07-22 DIAGNOSIS — E55.9 VITAMIN D DEFICIENCY: ICD-10-CM

## 2025-07-22 DIAGNOSIS — C50.412 MALIGNANT NEOPLASM OF UPPER-OUTER QUADRANT OF LEFT BREAST IN FEMALE, ESTROGEN RECEPTOR POSITIVE: ICD-10-CM

## 2025-07-22 PROCEDURE — 99215 OFFICE O/P EST HI 40 MIN: CPT | Performed by: NURSE PRACTITIONER

## 2025-07-22 PROCEDURE — 3074F SYST BP LT 130 MM HG: CPT | Performed by: NURSE PRACTITIONER

## 2025-07-22 PROCEDURE — 3078F DIAST BP <80 MM HG: CPT | Performed by: NURSE PRACTITIONER

## 2025-07-22 ASSESSMENT — PAIN SCALES - GENERAL: PAINLEVEL_OUTOF10: 0-NO PAIN

## 2025-07-22 NOTE — PROGRESS NOTES
Breast Medical Oncology Clinic  Location: [facility]     Visit Type: Follow Up Patient Visit     Oncologic History: Taken from Dr Bhatia's last note 4/2025 12/21/2023: Screening mammogram: New somewhat spiculated central lateral superior left breast mass and small nodule more anteriorly in the slight lateral left breast.     12/26/2023: Diagnostic breast imaging: Asymmetry in the medial right breast persists on additional imaging.  This is stable since August 2005.  In the left breast there is an irregular high density mass with associated architectural distortion.  An oval circumscribed equal density mass in the central lateral left breast at middle depth persists.  On ultrasound there is no suspicious finding to correspond to the right breast asymmetry.  In the left breast an irregular hypoechoic mass is seen at the 1 o'clock position 8 cm from the nipple measuring 1.8 x 1.2 x 2.0 cm.  At 4:00 3 cm from the nipple there is a cyst.  Targeted ultrasound of the left axilla demonstrates 1 abnormal appearing left axillary lymph node and 4 normal-appearing lymph nodes.     12/29/2023: Left breast mass ultrasound-guided core needle biopsy: Invasive ductal carcinoma, grade 3.  ER positive (greater than 95%) CT positive (10%) HER2 equal focal, not amplified by dual-stephanie.     12/29/2023: Left axillary lymph node biopsy shows metastatic carcinoma     1/29/2024 [facility] BRCA1/2 testing: Negative. Larger hereditary cancer panel also negative.     2/7/2024: Left breast Magseed partial mastectomy with sentinel lymph node biopsy: Pathology yields a single focus of invasive ductal carcinoma, grade 3 measuring 3 cm.  There is high grade DCIS present.  All margins are negative.  1 of 4 lymph nodes examined with macrometastasis.  PT2 pN1a.     3/12/2024: Oncotype DX recurrence score 34     4/12/24: C1D1 adjuvant docetaxel/cyclophosphamide     6/14/2024: Last cycle of TC    7/22/24-8/16/24: Completed radiation    9/22/24: Started  "anastrozole and verzenio     Subjective: History of Present Illness  Tanya presents today for breast cancer treatment follow up. Today I have reviewed with the patient I will be conducting a clinical physical breast exam. Patient has declined a second medical professional today during the exam as a chapperone.     She continues compliant on daily anastrozole and Verzenio. She is doing well, reports intermittent issues with Diarrhea but is so much improved with avoidance of dairy. She no longer needs imodium unless she has dairy. She denies any new breast cancer concerns. She denies any new masses or lesions in either breast and denies any skin changes elsewhere. She does report baseline tightness in the the left breast s/p Radiation therapy     She denies any chest pain or breathing issues, no sob or cough     She denies any vision changes or headache issues, dizziness or loss of balance, no falls. She did not sustain any neuropathy from prior chemo.    She denies any new or unexplained bone aches or pains    She reports a normal appetite and normal bowel movements, no issues with urination. She reports issues with vaginal dryness and painful intercourse.    She denies any issues with sleep or fatigue.     She stays busy with her 3 year old granddaughter and will walk for exercise. She takes daily MVI and fish oil and plus probiotic       Gynecologic History:      Age of first menses: 12 years old  Age of last menses: 52 years old  ; FLB at age 28  Post-menopausal  She did not breastfeed  Uterus/Ovaries: Intact  OCP: 20 years     Pertinent Family history:     Breast Cancer:  Maternal aunt, maternal great aunt  Ovarian Cancer: None  Pancreatic Cancer: None  Other:  Paternal grandmother with colon cancer; father with prostate cancer and skin cancer     Social History  Bisi Reynaga \"Tanya\"  reports that she has never smoked. She has never been exposed to tobacco smoke. She has never used smokeless tobacco.  She  " reports current alcohol use.  She  reports no history of drug use.    She lives with  and 24 yr old son with 3 yr old granddaughter. Arsenio  is her main support person. She is in real estate part time.     GYN:        Review of Systems  ROS 14 points performed, See HPI for exceptions    Physical Examination:     Vitals:    25 1102   BP: 123/76   Pulse: 77   Temp: 36.4 °C (97.5 °F)   SpO2: 100%         Vitals:    25 1102   Weight: 73.9 kg (162 lb 13 oz)         Constitutional: Well developed, awake/alert/oriented x4, no distress, alert and cooperative  EYES: Sclera clear  ENMT: mucous membranes moist, no apparent injury, no lesions seen  Head/Neck: Neck supple, no apparent injury, thyroid without mass or tenderness, No JVD, trachea midline, no bruits  Respiratory / Thoracic: Patent airways, clear to all lobes, normal breath sounds with good chest expansion, thorax symmetric.  Cardiovascular: Regular, rate and rhythm, no murmurs, 2+ equal pulses of the extremities, normal auscultated S 1and S 2  GI: Nondistended, soft, non-tender, no rebound tenderness or guarding, no masses palpable, no organomegaly, +BS, no bruits  Musculoskeletal: ROM intact, no joint swelling, normal strength, no spinal tenderness  Extremities: normal extremities, no cyanosis edema, contusions or wounds, no clubbing  Neurological: alert and oriented x4, intact senses, motor, response and reflexes, normal strength  Breast: s/p hx left partial mastectomy and Radiation therapy. No palpable masses or lesions in either breast with mild radiation tissue thickening in Left breast  Lymphatic: No cervical, supraclavicular, infraclavicular or axillary lymphadenopathy  Psychological: Appropriate and talkative mood and behavior  Skin: Warm and dry, no lesions, no rashes, no jaundice        ECOG Performance Status:      [x] 0 Fully active, able to carry on all pre-disease performance without restriction  [] 1 Restricted in  physically strenuous activity but ambulatory and able to carry out work of a light or sedentary nature, e.g., light house work, office work  [] 2 Ambulatory and capable of all selfcare but unable to carry out any work activities; up and about more than 50% of waking hours  [] 3 Capable of only limited selfcare; confined to bed or chair more than 50% of waking hours  [] 4 Completely disabled; cannot carry on any selfcare; totally confined to bed or chair  [] 5 Dead      Results:     Labs:  Lab Results   Component Value Date    WBC 2.2 (L) 07/08/2025    HGB 13.2 07/08/2025    HCT 39.8 07/08/2025    MCV 98 07/08/2025     07/08/2025         Chemistry    Lab Results   Component Value Date/Time     07/08/2025 1111    K 4.3 07/08/2025 1111     07/08/2025 1111    CO2 32 07/08/2025 1111    BUN 14 07/08/2025 1111    CREATININE 0.96 07/08/2025 1111    Lab Results   Component Value Date/Time    CALCIUM 9.9 07/08/2025 1111    ALKPHOS 82 07/08/2025 1111    AST 17 07/08/2025 1111    ALT 19 07/08/2025 1111    BILITOT 0.6 07/08/2025 1111             Imaging:  Narrative & Impression   Interpreted By:  Triston Redman,   STUDY:  BI MAMMO BILATERAL DIAGNOSTIC TOMOSYNTHESIS;  2/27/2025 1:20 pm      ACCESSION NUMBER(S):  [id number]      ORDERING CLINICIAN:  AR COVARRUBIAS      INDICATION:  Left lumpectomy with chemoradiation therapy. Bilateral annual.      ,Z08 Encounter for follow-up examination after completed treatment  for malignant neoplasm,Z85.3 Personal history of malignant neoplasm  of breast      COMPARISON:  12/21/2023 and 06/04/2020      FINDINGS:  MAMMOGRAPHY: 2D and tomosynthesis images were reviewed at 1 mm slice  thickness.      Density:  There are scattered areas of fibroglandular density.      New postsurgical scarring is seen the superolateral left breast at  mid to posterior depth and overlying the left axilla. Left breast  skin/trabecular thickening consistent with post radiation changes.  No  suspicious masses or calcifications are identified bilaterally.      IMPRESSION:  No mammographic evidence of malignancy.      BI-RADS CATEGORY:      BI-RADS Category:  2 Benign.  Recommendation:  Annual Screening.  Recommended Date:  1 Year.  Laterality:  Bilateral.       === 06/20/24 ===    DEXA BONE DENSITY    - Impression -  DEXA:  According to World Health Organization criteria,  classification is normal.  FINDINGS:  SPINE L1-L4  Bone Mineral Density: 1.271  T-Score 0.8  Z-Score 1.5  Classification:  Normal      LEFT FEMUR -TOTAL  Bone Mineral Density: 1.012  T-Score 0.0   Z-Score  0.7  Classification:  Normal      LEFT FEMUR -NECK  Bone Mineral Density: 1.035  T-Score 0.0  Z-Score 1.0  Classification:  Normal      Followup recommended in 2 years or sooner as clinically warranted.    All images and detailed analysis are available on the  Radiology  PACS.    Signed by: Bib Hardin 6/20/2024 4:33 PM  Dictation workstation:   [id number]         Assessment:      Pathologic prognostic stage IIA (pT2 pN1 cM0) infiltrating ductal carcinoma of the left breast; Dx in 12/2023; Grade 3; ER positive (>95%), [place] positive (10%), HER2 negative (2+, neg GIOVANI); Oncotype DX 34 ; S/p L PM and SLNBx; S/p TC x4; S/p radiation; On anastrozole and verzenio.      Tolerating treatment well. No evidence of breast cancer recurrence per patient history, physical examination and imaging findings. Will continue on with current plan, monthly labs per Dr Bhatia orders- have remained stable. Will add on Vit D level with August labs given hx of Vit D Deficiency     Grade 1-2 Diarrhea is resolved with avoidance of dairy      Plan:  Prior surgery: S/p L PM with SNLBx  Additional biopsy: No further biopsy indicated  Prior Radiation : Completed  Additional staging scans/DEXA/echo: Staging scans not indicated based on current stage, patient history and physical examination.  Baseline DEXA scan normal 6/2024. Planned repeat Fall  2026  Additional Path info (i.e Ki67, PDL1): Not indicated  Gene assays: Oncotype DX 34     Systemic treatment plan: Anastrozole and Verzenio. Verzenio dose reduced to 100 mg BID.              Intent: Curative              Clinical trial: Not eligible for our current trials              Endocrine therapy:  Anastrozole 9/2024              HER2 treatment: not indicated              Targeted agents:  Verzenio 9/2024              Chemotherapy: TC x 4 completed 6/2024              BMA: Previously discussed the role of zoledronic acid with Dr Bhatia as adjuvant therapy in post-menopausal (natural or induced) women with breast cancer who are on systemic therapy. Patient did declined at that time.      Access: Not indicated  Supportive meds: None prescribed this visit  Genetic testing: Completed; negative 1/2024  Fertility preservation: Not indicated    Other active problems/orders:      AMY: Previously seen and resolved following kidney stones      Surveillance plan: Continue yearly mammogram with breast surgery team; monthly labs while on verzenio     Follow-up: 3 months Dr Bhatia with continued monthly labs       At least 40 minutes of direct consultation was spent with the patient today reviewing her cancer care plan, cancer features, educating and answering questions regarding ongoing follow up, greater than 50% in counseling and coordination of care      Thank you for the opportunity to be involved your care.   We discussed the clinical significance of diagnosis, goals of care and treatment plan in detail.   Please do not hesitate to reach out with any questions.               Myrtle Herrera MSN, APRN, FNP-C  [facility]  Division of Medical Oncology- Breast   Collaborating Physician Dr. Rolan May   [facility] [place] [address]  Phone: [phone]  Fax: [phone]  Available via  Secure Chat    Confidential Peer Review Document-  Privilege  Privileged Pursuant to [place] Revised Code Section 2305.24, .25, .251 & .252

## 2025-07-22 NOTE — PATIENT INSTRUCTIONS
Follow up Dr Bhatia in October / Nov 2025. Labs monthly, I have added Vit D level to next labs given hx of insufficient Vit D     Please continue daily Anastrozole and Verzenio, I have updated rx for Verzenio    Radiation follow up with Jessica Frye 10/24/25     Breast surgery follow up with mammogram and Jennifer Santiago CNP 3/5/26    PCP Dr Tre Raymond annually and as needed. Please See GYN sooner for vaginal dryness issues - as we have discussed is safe to use sparingly vaginal estrogens 1-2 times per week.     Please call [phone] with any questions or concerns prior to your next office visit.

## 2025-07-23 ENCOUNTER — ONCOLOGY MEDICATION OUTREACH (OUTPATIENT)
Dept: HEMATOLOGY/ONCOLOGY | Facility: CLINIC | Age: 55
End: 2025-07-23
Payer: COMMERCIAL

## 2025-08-04 DIAGNOSIS — Z08 ENCOUNTER FOR FOLLOW-UP SURVEILLANCE OF BREAST CANCER: ICD-10-CM

## 2025-08-04 DIAGNOSIS — Z85.3 ENCOUNTER FOR FOLLOW-UP SURVEILLANCE OF BREAST CANCER: ICD-10-CM

## 2025-08-04 DIAGNOSIS — E55.9 VITAMIN D DEFICIENCY: ICD-10-CM

## 2025-08-15 DIAGNOSIS — C50.412 MALIGNANT NEOPLASM OF UPPER-OUTER QUADRANT OF LEFT BREAST IN FEMALE, ESTROGEN RECEPTOR POSITIVE: ICD-10-CM

## 2025-08-15 DIAGNOSIS — Z17.0 MALIGNANT NEOPLASM OF UPPER-OUTER QUADRANT OF LEFT BREAST IN FEMALE, ESTROGEN RECEPTOR POSITIVE: ICD-10-CM

## 2025-08-26 PROCEDURE — 80053 COMPREHEN METABOLIC PANEL: CPT

## 2025-08-26 PROCEDURE — 82306 VITAMIN D 25 HYDROXY: CPT

## 2025-08-26 PROCEDURE — 36415 COLL VENOUS BLD VENIPUNCTURE: CPT

## 2025-08-26 PROCEDURE — 85025 COMPLETE CBC W/AUTO DIFF WBC: CPT

## 2025-08-27 ENCOUNTER — ONCOLOGY MEDICATION OUTREACH (OUTPATIENT)
Dept: HEMATOLOGY/ONCOLOGY | Facility: CLINIC | Age: 55
End: 2025-08-27
Payer: COMMERCIAL

## 2025-08-27 ENCOUNTER — LAB (OUTPATIENT)
Dept: LAB | Facility: HOSPITAL | Age: 55
End: 2025-08-27
Payer: COMMERCIAL

## 2025-08-27 DIAGNOSIS — C50.412 MALIGNANT NEOPLASM OF UPPER-OUTER QUADRANT OF LEFT FEMALE BREAST: Primary | ICD-10-CM

## 2025-08-27 LAB
25(OH)D3 SERPL-MCNC: 46 NG/ML (ref 30–100)
ALBUMIN SERPL BCP-MCNC: 4.7 G/DL (ref 3.4–5)
ALP SERPL-CCNC: 79 U/L (ref 33–110)
ALT SERPL W P-5'-P-CCNC: 27 U/L (ref 7–45)
ANION GAP SERPL CALC-SCNC: 12 MMOL/L (ref 10–20)
AST SERPL W P-5'-P-CCNC: 20 U/L (ref 9–39)
BASOPHILS # BLD AUTO: 0.05 X10*3/UL (ref 0–0.1)
BASOPHILS NFR BLD AUTO: 1.8 %
BILIRUB SERPL-MCNC: 0.6 MG/DL (ref 0–1.2)
BUN SERPL-MCNC: 12 MG/DL (ref 6–23)
CALCIUM SERPL-MCNC: 10.1 MG/DL (ref 8.6–10.6)
CHLORIDE SERPL-SCNC: 103 MMOL/L (ref 98–107)
CO2 SERPL-SCNC: 31 MMOL/L (ref 21–32)
CREAT SERPL-MCNC: 0.9 MG/DL (ref 0.5–1.05)
EGFRCR SERPLBLD CKD-EPI 2021: 76 ML/MIN/1.73M*2
EOSINOPHIL # BLD AUTO: 0.02 X10*3/UL (ref 0–0.7)
EOSINOPHIL NFR BLD AUTO: 0.7 %
ERYTHROCYTE [DISTWIDTH] IN BLOOD BY AUTOMATED COUNT: 12.9 % (ref 11.5–14.5)
GLUCOSE SERPL-MCNC: 82 MG/DL (ref 74–99)
HCT VFR BLD AUTO: 40.9 % (ref 36–46)
HGB BLD-MCNC: 13.6 G/DL (ref 12–16)
IMM GRANULOCYTES # BLD AUTO: 0 X10*3/UL (ref 0–0.7)
IMM GRANULOCYTES NFR BLD AUTO: 0 % (ref 0–0.9)
LYMPHOCYTES # BLD AUTO: 1.06 X10*3/UL (ref 1.2–4.8)
LYMPHOCYTES NFR BLD AUTO: 38.1 %
MCH RBC QN AUTO: 32.3 PG (ref 26–34)
MCHC RBC AUTO-ENTMCNC: 33.3 G/DL (ref 32–36)
MCV RBC AUTO: 97 FL (ref 80–100)
MONOCYTES # BLD AUTO: 0.21 X10*3/UL (ref 0.1–1)
MONOCYTES NFR BLD AUTO: 7.6 %
NEUTROPHILS # BLD AUTO: 1.44 X10*3/UL (ref 1.2–7.7)
NEUTROPHILS NFR BLD AUTO: 51.8 %
NRBC BLD-RTO: 0 /100 WBCS (ref 0–0)
PLATELET # BLD AUTO: 197 X10*3/UL (ref 150–450)
POTASSIUM SERPL-SCNC: 4.2 MMOL/L (ref 3.5–5.3)
PROT SERPL-MCNC: 7.1 G/DL (ref 6.4–8.2)
RBC # BLD AUTO: 4.21 X10*6/UL (ref 4–5.2)
SODIUM SERPL-SCNC: 142 MMOL/L (ref 136–145)
WBC # BLD AUTO: 2.8 X10*3/UL (ref 4.4–11.3)

## (undated) DEVICE — Device

## (undated) DEVICE — ELECTRODE, ELECTROSURGICAL, BLADE, INSULATED, ENT/IMA, STERILE

## (undated) DEVICE — DRESSING, ISLAND, TELFA, 4 X 5 IN

## (undated) DEVICE — PACK, UNIVERSAL

## (undated) DEVICE — MARKER, SKIN, REGULAR TIP, W/FLEXI-RULER

## (undated) DEVICE — KIT, MARGINMARKER, 6 INK COLORS, STANDARD

## (undated) DEVICE — PROBE, TRUNODE GAMMA

## (undated) DEVICE — DRAPE, SHEET, THREE QUARTER, FAN FOLD, 57 X 77 IN

## (undated) DEVICE — APPLICATOR, CHLORAPREP, W/ORANGE TINT, 26ML

## (undated) DEVICE — RADIOGRAPHY DEVICE, SPECIMEN, TRANSPEC

## (undated) DEVICE — RETRACTOR, HANDHELD, 250ML, DBL ENDED

## (undated) DEVICE — DRESSING, GAUZE, SUPER FLUFF, 7.75 X 8.75 IN, STERILE

## (undated) DEVICE — SLEEVE, VASO PRESS, CALF GARMENT, MEDIUM, GREEN

## (undated) DEVICE — PROTECTOR, HEEL/ANKLE/ELBOW, UNIVERSAL

## (undated) DEVICE — CLIP, LIGATING, LIGACLIP EXTRA, MEDIUM, TITANIUM, WHITE

## (undated) DEVICE — PROBE COVER, INTRAOPERATIVE, 13 X 244CM (5 X 96IN)